# Patient Record
Sex: MALE | Race: WHITE | Employment: PART TIME | URBAN - METROPOLITAN AREA
[De-identification: names, ages, dates, MRNs, and addresses within clinical notes are randomized per-mention and may not be internally consistent; named-entity substitution may affect disease eponyms.]

---

## 2017-01-17 PROBLEM — T81.89XA NON-HEALING SURGICAL WOUND: Chronic | Status: ACTIVE | Noted: 2017-01-17

## 2017-02-07 ENCOUNTER — HOSPITAL ENCOUNTER (OUTPATIENT)
Dept: WOUND CARE | Age: 27
Discharge: HOME | End: 2017-02-07
Payer: COMMERCIAL

## 2017-02-07 VITALS
TEMPERATURE: 98.1 F | SYSTOLIC BLOOD PRESSURE: 121 MMHG | DIASTOLIC BLOOD PRESSURE: 64 MMHG | HEART RATE: 100 BPM | RESPIRATION RATE: 18 BRPM

## 2017-02-07 PROCEDURE — 97607 NEG PRS WND THR NDME<=50SQCM: CPT

## 2017-02-07 PROCEDURE — 97605 NEG PRS WND THER DME<=50SQCM: CPT | Performed by: SURGERY

## 2017-02-07 PROCEDURE — 11042 DBRDMT SUBQ TIS 1ST 20SQCM/<: CPT

## 2017-02-07 PROCEDURE — 6370000000 HC RX 637 (ALT 250 FOR IP): Performed by: SURGERY

## 2017-02-07 RX ADMIN — LIDOCAINE HYDROCHLORIDE: 20 JELLY TOPICAL at 11:13

## 2017-02-14 ENCOUNTER — HOSPITAL ENCOUNTER (OUTPATIENT)
Dept: WOUND CARE | Age: 27
Discharge: HOME | End: 2017-02-14
Payer: COMMERCIAL

## 2017-02-14 VITALS
SYSTOLIC BLOOD PRESSURE: 133 MMHG | DIASTOLIC BLOOD PRESSURE: 76 MMHG | RESPIRATION RATE: 18 BRPM | HEART RATE: 92 BPM | TEMPERATURE: 97 F

## 2017-02-14 PROCEDURE — 11042 DBRDMT SUBQ TIS 1ST 20SQCM/<: CPT

## 2017-02-14 PROCEDURE — 6370000000 HC RX 637 (ALT 250 FOR IP): Performed by: SURGERY

## 2017-02-14 RX ORDER — LEVOFLOXACIN 500 MG/1
500 TABLET, FILM COATED ORAL DAILY
Qty: 10 TABLET | Refills: 0 | Status: SHIPPED | OUTPATIENT
Start: 2017-02-14 | End: 2017-02-24

## 2017-02-14 RX ADMIN — LIDOCAINE HYDROCHLORIDE: 20 JELLY TOPICAL at 11:13

## 2017-02-21 ENCOUNTER — HOSPITAL ENCOUNTER (OUTPATIENT)
Dept: WOUND CARE | Age: 27
Discharge: HOME | End: 2017-02-21
Payer: COMMERCIAL

## 2017-02-21 VITALS
RESPIRATION RATE: 16 BRPM | TEMPERATURE: 97.1 F | HEART RATE: 102 BPM | DIASTOLIC BLOOD PRESSURE: 75 MMHG | SYSTOLIC BLOOD PRESSURE: 134 MMHG

## 2017-02-21 PROCEDURE — 11042 DBRDMT SUBQ TIS 1ST 20SQCM/<: CPT

## 2017-02-21 PROCEDURE — 6370000000 HC RX 637 (ALT 250 FOR IP): Performed by: SURGERY

## 2017-02-21 RX ADMIN — LIDOCAINE HYDROCHLORIDE: 20 JELLY TOPICAL at 11:18

## 2017-02-28 ENCOUNTER — HOSPITAL ENCOUNTER (OUTPATIENT)
Dept: WOUND CARE | Age: 27
Discharge: HOME | End: 2017-02-28
Payer: COMMERCIAL

## 2017-02-28 VITALS
DIASTOLIC BLOOD PRESSURE: 62 MMHG | TEMPERATURE: 97 F | SYSTOLIC BLOOD PRESSURE: 127 MMHG | RESPIRATION RATE: 16 BRPM | HEART RATE: 102 BPM

## 2017-02-28 PROCEDURE — 11042 DBRDMT SUBQ TIS 1ST 20SQCM/<: CPT

## 2017-02-28 PROCEDURE — 6370000000 HC RX 637 (ALT 250 FOR IP): Performed by: SURGERY

## 2017-02-28 RX ADMIN — LIDOCAINE HYDROCHLORIDE: 20 JELLY TOPICAL at 11:26

## 2017-03-07 ENCOUNTER — HOSPITAL ENCOUNTER (OUTPATIENT)
Dept: WOUND CARE | Age: 27
Discharge: HOME | End: 2017-03-07
Payer: COMMERCIAL

## 2017-03-07 VITALS
HEART RATE: 96 BPM | TEMPERATURE: 97.5 F | DIASTOLIC BLOOD PRESSURE: 88 MMHG | SYSTOLIC BLOOD PRESSURE: 137 MMHG | RESPIRATION RATE: 16 BRPM

## 2017-03-07 PROCEDURE — 97597 DBRDMT OPN WND 1ST 20 CM/<: CPT

## 2017-03-07 PROCEDURE — 6370000000 HC RX 637 (ALT 250 FOR IP): Performed by: SURGERY

## 2017-03-07 RX ADMIN — LIDOCAINE HYDROCHLORIDE: 20 JELLY TOPICAL at 11:41

## 2017-03-14 ENCOUNTER — HOSPITAL ENCOUNTER (OUTPATIENT)
Dept: WOUND CARE | Age: 27
Discharge: HOME | End: 2017-03-14
Payer: COMMERCIAL

## 2017-03-17 ENCOUNTER — HOSPITAL ENCOUNTER (OUTPATIENT)
Dept: WOUND CARE | Age: 27
Discharge: HOME | End: 2017-03-17
Payer: COMMERCIAL

## 2017-03-21 ENCOUNTER — HOSPITAL ENCOUNTER (OUTPATIENT)
Dept: WOUND CARE | Age: 27
Discharge: HOME | End: 2017-03-21
Payer: COMMERCIAL

## 2017-03-21 VITALS
RESPIRATION RATE: 18 BRPM | HEART RATE: 104 BPM | DIASTOLIC BLOOD PRESSURE: 72 MMHG | TEMPERATURE: 97.6 F | SYSTOLIC BLOOD PRESSURE: 120 MMHG

## 2017-03-21 PROCEDURE — 6370000000 HC RX 637 (ALT 250 FOR IP): Performed by: SURGERY

## 2017-03-21 PROCEDURE — 11042 DBRDMT SUBQ TIS 1ST 20SQCM/<: CPT

## 2017-03-21 RX ORDER — LIDOCAINE AND PRILOCAINE 25; 25 MG/G; MG/G
CREAM TOPICAL ONCE
Status: COMPLETED | OUTPATIENT
Start: 2017-03-21 | End: 2017-03-21

## 2017-03-21 RX ADMIN — LIDOCAINE AND PRILOCAINE: 25; 25 CREAM TOPICAL at 11:29

## 2017-04-04 ENCOUNTER — HOSPITAL ENCOUNTER (OUTPATIENT)
Dept: WOUND CARE | Age: 27
Discharge: HOME | End: 2017-04-04
Payer: COMMERCIAL

## 2017-04-04 VITALS
TEMPERATURE: 97.2 F | HEART RATE: 104 BPM | RESPIRATION RATE: 16 BRPM | DIASTOLIC BLOOD PRESSURE: 74 MMHG | SYSTOLIC BLOOD PRESSURE: 128 MMHG

## 2017-04-04 DIAGNOSIS — S31.819D WOUND OF RIGHT BUTTOCK, SUBSEQUENT ENCOUNTER: Primary | Chronic | ICD-10-CM

## 2017-04-04 DIAGNOSIS — T81.89XD NON-HEALING SURGICAL WOUND, SUBSEQUENT ENCOUNTER: Chronic | ICD-10-CM

## 2017-04-04 PROCEDURE — 11042 DBRDMT SUBQ TIS 1ST 20SQCM/<: CPT

## 2017-04-04 PROCEDURE — 11042 DBRDMT SUBQ TIS 1ST 20SQCM/<: CPT | Performed by: NURSE PRACTITIONER

## 2017-04-17 ENCOUNTER — HOSPITAL ENCOUNTER (OUTPATIENT)
Dept: CT IMAGING | Age: 27
Discharge: HOME | End: 2017-04-17
Payer: COMMERCIAL

## 2017-04-17 DIAGNOSIS — T81.89XD NON-HEALING SURGICAL WOUND, SUBSEQUENT ENCOUNTER: Chronic | ICD-10-CM

## 2017-04-17 DIAGNOSIS — S31.819D WOUND OF RIGHT BUTTOCK, SUBSEQUENT ENCOUNTER: Chronic | ICD-10-CM

## 2017-04-17 PROCEDURE — 6360000004 HC RX CONTRAST MEDICATION

## 2017-04-17 PROCEDURE — 72192 CT PELVIS W/O DYE: CPT

## 2017-04-17 RX ADMIN — IOHEXOL 50 ML: 240 INJECTION, SOLUTION INTRATHECAL; INTRAVASCULAR; INTRAVENOUS; ORAL at 13:31

## 2017-04-18 ENCOUNTER — HOSPITAL ENCOUNTER (OUTPATIENT)
Dept: WOUND CARE | Age: 27
Discharge: HOME | End: 2017-04-18
Payer: COMMERCIAL

## 2017-04-18 VITALS
SYSTOLIC BLOOD PRESSURE: 143 MMHG | TEMPERATURE: 96.7 F | DIASTOLIC BLOOD PRESSURE: 74 MMHG | HEART RATE: 113 BPM | RESPIRATION RATE: 16 BRPM

## 2017-04-18 PROCEDURE — 11042 DBRDMT SUBQ TIS 1ST 20SQCM/<: CPT

## 2017-04-18 PROCEDURE — 6370000000 HC RX 637 (ALT 250 FOR IP): Performed by: SURGERY

## 2017-04-18 RX ADMIN — LIDOCAINE HYDROCHLORIDE: 20 JELLY TOPICAL at 11:17

## 2017-04-25 ENCOUNTER — HOSPITAL ENCOUNTER (OUTPATIENT)
Dept: WOUND CARE | Age: 27
Discharge: HOME | End: 2017-04-25
Payer: COMMERCIAL

## 2017-05-02 ENCOUNTER — HOSPITAL ENCOUNTER (OUTPATIENT)
Dept: WOUND CARE | Age: 27
Discharge: HOME | End: 2017-05-02
Payer: COMMERCIAL

## 2017-05-02 VITALS
DIASTOLIC BLOOD PRESSURE: 69 MMHG | TEMPERATURE: 97.3 F | SYSTOLIC BLOOD PRESSURE: 133 MMHG | RESPIRATION RATE: 18 BRPM | HEART RATE: 95 BPM

## 2017-05-02 PROCEDURE — 6370000000 HC RX 637 (ALT 250 FOR IP): Performed by: SURGERY

## 2017-05-02 PROCEDURE — 11042 DBRDMT SUBQ TIS 1ST 20SQCM/<: CPT

## 2017-05-02 RX ADMIN — LIDOCAINE HYDROCHLORIDE: 20 JELLY TOPICAL at 11:50

## 2017-05-16 ENCOUNTER — HOSPITAL ENCOUNTER (OUTPATIENT)
Dept: WOUND CARE | Age: 27
Discharge: HOME | End: 2017-05-16
Payer: COMMERCIAL

## 2017-05-23 ENCOUNTER — HOSPITAL ENCOUNTER (OUTPATIENT)
Dept: WOUND CARE | Age: 27
Discharge: HOME | End: 2017-05-23
Payer: COMMERCIAL

## 2017-05-23 VITALS
RESPIRATION RATE: 18 BRPM | TEMPERATURE: 97.7 F | DIASTOLIC BLOOD PRESSURE: 76 MMHG | HEART RATE: 99 BPM | SYSTOLIC BLOOD PRESSURE: 134 MMHG

## 2017-05-23 PROCEDURE — 99212 OFFICE O/P EST SF 10 MIN: CPT

## 2017-05-23 PROCEDURE — 6370000000 HC RX 637 (ALT 250 FOR IP): Performed by: SURGERY

## 2017-05-23 PROCEDURE — G0463 HOSPITAL OUTPT CLINIC VISIT: HCPCS

## 2017-05-23 RX ADMIN — LIDOCAINE HYDROCHLORIDE: 20 JELLY TOPICAL at 10:04

## 2017-05-23 ASSESSMENT — PAIN SCALES - GENERAL: PAINLEVEL_OUTOF10: 0

## 2017-06-06 ENCOUNTER — HOSPITAL ENCOUNTER (OUTPATIENT)
Dept: WOUND CARE | Age: 27
Discharge: HOME | End: 2017-06-06
Payer: COMMERCIAL

## 2017-06-06 VITALS
DIASTOLIC BLOOD PRESSURE: 80 MMHG | TEMPERATURE: 97 F | HEART RATE: 99 BPM | RESPIRATION RATE: 16 BRPM | SYSTOLIC BLOOD PRESSURE: 142 MMHG

## 2017-06-06 PROCEDURE — 11042 DBRDMT SUBQ TIS 1ST 20SQCM/<: CPT

## 2017-06-06 PROCEDURE — 6370000000 HC RX 637 (ALT 250 FOR IP): Performed by: SURGERY

## 2017-06-06 RX ADMIN — LIDOCAINE HYDROCHLORIDE: 20 JELLY TOPICAL at 09:25

## 2017-06-13 ENCOUNTER — HOSPITAL ENCOUNTER (OUTPATIENT)
Dept: WOUND CARE | Age: 27
Discharge: HOME | End: 2017-06-13
Payer: COMMERCIAL

## 2017-06-14 DIAGNOSIS — T81.89XS NON-HEALING SURGICAL WOUND, SEQUELA: Chronic | ICD-10-CM

## 2017-06-14 DIAGNOSIS — Q05.9 MYELOMENINGOCELE (HCC): Primary | Chronic | ICD-10-CM

## 2017-06-14 DIAGNOSIS — Q03.9 CONGENITAL HYDROCEPHALUS (HCC): Chronic | ICD-10-CM

## 2017-06-14 DIAGNOSIS — M41.35 THORACOGENIC SCOLIOSIS OF THORACOLUMBAR REGION: Chronic | ICD-10-CM

## 2017-06-14 DIAGNOSIS — S31.819S WOUND OF RIGHT BUTTOCK, SEQUELA: Chronic | ICD-10-CM

## 2017-06-20 ENCOUNTER — HOSPITAL ENCOUNTER (OUTPATIENT)
Dept: WOUND CARE | Age: 27
Discharge: HOME | End: 2017-06-20
Payer: COMMERCIAL

## 2017-06-20 VITALS
RESPIRATION RATE: 16 BRPM | DIASTOLIC BLOOD PRESSURE: 75 MMHG | SYSTOLIC BLOOD PRESSURE: 127 MMHG | TEMPERATURE: 97.7 F | HEART RATE: 110 BPM

## 2017-06-20 DIAGNOSIS — S31.819D WOUND OF RIGHT BUTTOCK, SUBSEQUENT ENCOUNTER: Primary | Chronic | ICD-10-CM

## 2017-06-20 LAB
ALBUMIN SERPL-MCNC: 4.8 G/DL (ref 3.5–5.2)
CULTURE: NORMAL
DIRECT EXAM: NORMAL
ESTIMATED AVERAGE GLUCOSE: 97 MG/DL
HBA1C MFR BLD: 5 % (ref 4–6)
Lab: NORMAL
PREALBUMIN: 21 MG/DL (ref 20–40)
SPECIMEN DESCRIPTION: NORMAL
SPECIMEN DESCRIPTION: NORMAL
STATUS: NORMAL
TOTAL PROTEIN: 7.8 G/DL (ref 6.4–8.3)

## 2017-06-20 PROCEDURE — 86403 PARTICLE AGGLUT ANTBDY SCRN: CPT

## 2017-06-20 PROCEDURE — 87176 TISSUE HOMOGENIZATION CULTR: CPT

## 2017-06-20 PROCEDURE — 11042 DBRDMT SUBQ TIS 1ST 20SQCM/<: CPT

## 2017-06-20 PROCEDURE — 83036 HEMOGLOBIN GLYCOSYLATED A1C: CPT

## 2017-06-20 PROCEDURE — 6370000000 HC RX 637 (ALT 250 FOR IP): Performed by: SURGERY

## 2017-06-20 PROCEDURE — 87075 CULTR BACTERIA EXCEPT BLOOD: CPT

## 2017-06-20 PROCEDURE — 87185 SC STD ENZYME DETCJ PER NZM: CPT

## 2017-06-20 PROCEDURE — 82040 ASSAY OF SERUM ALBUMIN: CPT

## 2017-06-20 PROCEDURE — 87070 CULTURE OTHR SPECIMN AEROBIC: CPT

## 2017-06-20 PROCEDURE — 84155 ASSAY OF PROTEIN SERUM: CPT

## 2017-06-20 PROCEDURE — 84134 ASSAY OF PREALBUMIN: CPT

## 2017-06-20 PROCEDURE — 87076 CULTURE ANAEROBE IDENT EACH: CPT

## 2017-06-20 PROCEDURE — 87205 SMEAR GRAM STAIN: CPT

## 2017-06-20 PROCEDURE — 36415 COLL VENOUS BLD VENIPUNCTURE: CPT

## 2017-06-20 PROCEDURE — 87186 SC STD MICRODIL/AGAR DIL: CPT

## 2017-06-20 RX ADMIN — LIDOCAINE HYDROCHLORIDE: 20 JELLY TOPICAL at 10:29

## 2017-06-22 ENCOUNTER — TELEPHONE (OUTPATIENT)
Dept: FAMILY MEDICINE CLINIC | Age: 27
End: 2017-06-22

## 2017-06-24 LAB
CULTURE: ABNORMAL
DIRECT EXAM: ABNORMAL
DIRECT EXAM: ABNORMAL
Lab: ABNORMAL
ORGANISM: ABNORMAL
SPECIMEN DESCRIPTION: ABNORMAL
STATUS: ABNORMAL

## 2017-07-05 ENCOUNTER — CARE COORDINATION (OUTPATIENT)
Dept: CARE COORDINATION | Age: 27
End: 2017-07-05

## 2017-07-07 ENCOUNTER — HOSPITAL ENCOUNTER (OUTPATIENT)
Dept: WOUND CARE | Age: 27
Discharge: HOME | End: 2017-07-07
Payer: COMMERCIAL

## 2017-07-11 ENCOUNTER — HOSPITAL ENCOUNTER (OUTPATIENT)
Dept: WOUND CARE | Age: 27
Discharge: HOME | End: 2017-07-11
Payer: COMMERCIAL

## 2017-07-11 VITALS
HEART RATE: 90 BPM | RESPIRATION RATE: 16 BRPM | DIASTOLIC BLOOD PRESSURE: 71 MMHG | TEMPERATURE: 96.6 F | SYSTOLIC BLOOD PRESSURE: 136 MMHG

## 2017-07-11 PROCEDURE — 87205 SMEAR GRAM STAIN: CPT

## 2017-07-11 PROCEDURE — 11042 DBRDMT SUBQ TIS 1ST 20SQCM/<: CPT

## 2017-07-11 PROCEDURE — 87070 CULTURE OTHR SPECIMN AEROBIC: CPT

## 2017-07-11 PROCEDURE — 6370000000 HC RX 637 (ALT 250 FOR IP): Performed by: SURGERY

## 2017-07-11 PROCEDURE — 86403 PARTICLE AGGLUT ANTBDY SCRN: CPT

## 2017-07-11 PROCEDURE — 87186 SC STD MICRODIL/AGAR DIL: CPT

## 2017-07-11 RX ORDER — GAUZE BANDAGE 4" X 4"
1 BANDAGE TOPICAL DAILY
Qty: 30 EACH | Refills: 5 | Status: SHIPPED | OUTPATIENT
Start: 2017-07-11

## 2017-07-11 RX ADMIN — LIDOCAINE HYDROCHLORIDE: 20 JELLY TOPICAL at 09:33

## 2017-07-14 LAB
CULTURE: ABNORMAL
DIRECT EXAM: ABNORMAL
DIRECT EXAM: ABNORMAL
Lab: ABNORMAL
ORGANISM: ABNORMAL
SPECIMEN DESCRIPTION: ABNORMAL
SPECIMEN DESCRIPTION: ABNORMAL
STATUS: ABNORMAL

## 2017-07-17 RX ORDER — LEVOFLOXACIN 500 MG/1
500 TABLET, FILM COATED ORAL DAILY
Qty: 14 TABLET | Refills: 0 | Status: SHIPPED | OUTPATIENT
Start: 2017-07-17 | End: 2017-07-17 | Stop reason: SDUPTHER

## 2017-07-18 ENCOUNTER — HOSPITAL ENCOUNTER (OUTPATIENT)
Dept: WOUND CARE | Age: 27
Discharge: HOME | End: 2017-07-18
Payer: COMMERCIAL

## 2017-07-18 VITALS
RESPIRATION RATE: 16 BRPM | TEMPERATURE: 97.2 F | HEART RATE: 97 BPM | SYSTOLIC BLOOD PRESSURE: 128 MMHG | DIASTOLIC BLOOD PRESSURE: 74 MMHG

## 2017-07-18 PROCEDURE — 11042 DBRDMT SUBQ TIS 1ST 20SQCM/<: CPT

## 2017-07-18 PROCEDURE — 6370000000 HC RX 637 (ALT 250 FOR IP): Performed by: SURGERY

## 2017-07-18 RX ADMIN — LIDOCAINE HYDROCHLORIDE: 20 JELLY TOPICAL at 09:32

## 2017-07-25 ENCOUNTER — HOSPITAL ENCOUNTER (OUTPATIENT)
Dept: WOUND CARE | Age: 27
Discharge: HOME | End: 2017-07-25
Payer: COMMERCIAL

## 2017-07-26 ENCOUNTER — OFFICE VISIT (OUTPATIENT)
Dept: FAMILY MEDICINE CLINIC | Age: 27
End: 2017-07-26
Payer: COMMERCIAL

## 2017-07-26 VITALS
HEART RATE: 89 BPM | HEIGHT: 63 IN | SYSTOLIC BLOOD PRESSURE: 122 MMHG | WEIGHT: 156.09 LBS | DIASTOLIC BLOOD PRESSURE: 80 MMHG | BODY MASS INDEX: 27.66 KG/M2 | OXYGEN SATURATION: 98 %

## 2017-07-26 DIAGNOSIS — A49.01 MSSA (METHICILLIN SUSCEPTIBLE STAPHYLOCOCCUS AUREUS) INFECTION: ICD-10-CM

## 2017-07-26 DIAGNOSIS — Q05.9 MYELOMENINGOCELE (HCC): Primary | Chronic | ICD-10-CM

## 2017-07-26 PROCEDURE — 99213 OFFICE O/P EST LOW 20 MIN: CPT | Performed by: INTERNAL MEDICINE

## 2017-07-26 RX ORDER — LEVOFLOXACIN 500 MG/1
500 TABLET, FILM COATED ORAL DAILY
Qty: 14 TABLET | Refills: 0 | Status: CANCELLED | OUTPATIENT
Start: 2017-07-26 | End: 2017-08-09

## 2017-07-26 ASSESSMENT — PATIENT HEALTH QUESTIONNAIRE - PHQ9
SUM OF ALL RESPONSES TO PHQ9 QUESTIONS 1 & 2: 0
SUM OF ALL RESPONSES TO PHQ QUESTIONS 1-9: 0
1. LITTLE INTEREST OR PLEASURE IN DOING THINGS: 0
2. FEELING DOWN, DEPRESSED OR HOPELESS: 0

## 2017-07-26 ASSESSMENT — ENCOUNTER SYMPTOMS
CHEST TIGHTNESS: 0
APNEA: 0
WHEEZING: 0
FACIAL SWELLING: 0
ABDOMINAL PAIN: 0
ABDOMINAL DISTENTION: 0
COLOR CHANGE: 0
CONSTIPATION: 0
SHORTNESS OF BREATH: 0
BACK PAIN: 1
DIARRHEA: 0
COUGH: 0

## 2017-08-22 ENCOUNTER — HOSPITAL ENCOUNTER (OUTPATIENT)
Dept: WOUND CARE | Age: 27
Discharge: HOME | End: 2017-08-22
Payer: COMMERCIAL

## 2017-08-22 VITALS
DIASTOLIC BLOOD PRESSURE: 74 MMHG | HEART RATE: 111 BPM | RESPIRATION RATE: 18 BRPM | TEMPERATURE: 97.5 F | SYSTOLIC BLOOD PRESSURE: 130 MMHG

## 2017-08-22 PROCEDURE — 11042 DBRDMT SUBQ TIS 1ST 20SQCM/<: CPT

## 2017-08-22 PROCEDURE — 6370000000 HC RX 637 (ALT 250 FOR IP): Performed by: SURGERY

## 2017-08-22 RX ADMIN — LIDOCAINE HYDROCHLORIDE: 20 JELLY TOPICAL at 10:18

## 2017-08-29 ENCOUNTER — HOSPITAL ENCOUNTER (OUTPATIENT)
Dept: WOUND CARE | Age: 27
Discharge: HOME | End: 2017-08-29
Payer: COMMERCIAL

## 2017-08-29 VITALS
SYSTOLIC BLOOD PRESSURE: 132 MMHG | RESPIRATION RATE: 18 BRPM | HEART RATE: 92 BPM | DIASTOLIC BLOOD PRESSURE: 75 MMHG | TEMPERATURE: 97.8 F

## 2017-08-29 PROCEDURE — 6370000000 HC RX 637 (ALT 250 FOR IP): Performed by: SURGERY

## 2017-08-29 PROCEDURE — 11042 DBRDMT SUBQ TIS 1ST 20SQCM/<: CPT

## 2017-08-29 RX ADMIN — LIDOCAINE HYDROCHLORIDE: 20 JELLY TOPICAL at 10:43

## 2017-09-12 ENCOUNTER — HOSPITAL ENCOUNTER (OUTPATIENT)
Dept: WOUND CARE | Age: 27
Discharge: HOME | End: 2017-09-12
Payer: COMMERCIAL

## 2017-09-12 VITALS
DIASTOLIC BLOOD PRESSURE: 81 MMHG | TEMPERATURE: 97.2 F | RESPIRATION RATE: 18 BRPM | SYSTOLIC BLOOD PRESSURE: 145 MMHG | HEART RATE: 105 BPM

## 2017-09-12 PROCEDURE — 11042 DBRDMT SUBQ TIS 1ST 20SQCM/<: CPT

## 2017-09-12 ASSESSMENT — PAIN SCALES - GENERAL: PAINLEVEL_OUTOF10: 0

## 2017-09-26 ENCOUNTER — HOSPITAL ENCOUNTER (OUTPATIENT)
Dept: WOUND CARE | Age: 27
Discharge: HOME | End: 2017-09-26
Payer: COMMERCIAL

## 2017-09-26 VITALS
HEART RATE: 98 BPM | SYSTOLIC BLOOD PRESSURE: 140 MMHG | TEMPERATURE: 97.7 F | RESPIRATION RATE: 18 BRPM | DIASTOLIC BLOOD PRESSURE: 80 MMHG

## 2017-09-26 PROCEDURE — 6370000000 HC RX 637 (ALT 250 FOR IP): Performed by: SURGERY

## 2017-09-26 PROCEDURE — 11042 DBRDMT SUBQ TIS 1ST 20SQCM/<: CPT

## 2017-09-26 RX ADMIN — LIDOCAINE HYDROCHLORIDE: 20 JELLY TOPICAL at 10:27

## 2017-10-02 ENCOUNTER — HOSPITAL ENCOUNTER (OUTPATIENT)
Age: 27
Setting detail: OUTPATIENT SURGERY
Discharge: HOME | End: 2017-10-02
Attending: SURGERY | Admitting: SURGERY
Payer: COMMERCIAL

## 2017-10-02 ENCOUNTER — ANESTHESIA EVENT (OUTPATIENT)
Dept: OPERATING ROOM | Age: 27
End: 2017-10-02
Payer: COMMERCIAL

## 2017-10-02 ENCOUNTER — ANESTHESIA (OUTPATIENT)
Dept: OPERATING ROOM | Age: 27
End: 2017-10-02
Payer: COMMERCIAL

## 2017-10-02 VITALS
HEART RATE: 74 BPM | DIASTOLIC BLOOD PRESSURE: 82 MMHG | WEIGHT: 160 LBS | RESPIRATION RATE: 12 BRPM | TEMPERATURE: 98.2 F | BODY MASS INDEX: 30.21 KG/M2 | SYSTOLIC BLOOD PRESSURE: 126 MMHG | HEIGHT: 61 IN | OXYGEN SATURATION: 100 %

## 2017-10-02 VITALS — SYSTOLIC BLOOD PRESSURE: 99 MMHG | DIASTOLIC BLOOD PRESSURE: 55 MMHG | OXYGEN SATURATION: 100 % | TEMPERATURE: 94.3 F

## 2017-10-02 PROCEDURE — 2500000003 HC RX 250 WO HCPCS: Performed by: SURGERY

## 2017-10-02 PROCEDURE — 3700000001 HC ADD 15 MINUTES (ANESTHESIA): Performed by: SURGERY

## 2017-10-02 PROCEDURE — 7100000030 HC ASPR PHASE II RECOVERY - FIRST 15 MIN: Performed by: SURGERY

## 2017-10-02 PROCEDURE — 86403 PARTICLE AGGLUT ANTBDY SCRN: CPT

## 2017-10-02 PROCEDURE — 87176 TISSUE HOMOGENIZATION CULTR: CPT

## 2017-10-02 PROCEDURE — 87205 SMEAR GRAM STAIN: CPT

## 2017-10-02 PROCEDURE — 2500000003 HC RX 250 WO HCPCS: Performed by: NURSE ANESTHETIST, CERTIFIED REGISTERED

## 2017-10-02 PROCEDURE — 2580000003 HC RX 258: Performed by: SURGERY

## 2017-10-02 PROCEDURE — 88304 TISSUE EXAM BY PATHOLOGIST: CPT

## 2017-10-02 PROCEDURE — 7100000001 HC PACU RECOVERY - ADDTL 15 MIN: Performed by: SURGERY

## 2017-10-02 PROCEDURE — 6360000002 HC RX W HCPCS: Performed by: NURSE ANESTHETIST, CERTIFIED REGISTERED

## 2017-10-02 PROCEDURE — 3600000002 HC SURGERY LEVEL 2 BASE: Performed by: SURGERY

## 2017-10-02 PROCEDURE — 3600000012 HC SURGERY LEVEL 2 ADDTL 15MIN: Performed by: SURGERY

## 2017-10-02 PROCEDURE — 3700000000 HC ANESTHESIA ATTENDED CARE: Performed by: SURGERY

## 2017-10-02 PROCEDURE — 87070 CULTURE OTHR SPECIMN AEROBIC: CPT

## 2017-10-02 PROCEDURE — 87185 SC STD ENZYME DETCJ PER NZM: CPT

## 2017-10-02 PROCEDURE — 87102 FUNGUS ISOLATION CULTURE: CPT

## 2017-10-02 PROCEDURE — 87076 CULTURE ANAEROBE IDENT EACH: CPT

## 2017-10-02 PROCEDURE — 87206 SMEAR FLUORESCENT/ACID STAI: CPT

## 2017-10-02 PROCEDURE — 7100000000 HC PACU RECOVERY - FIRST 15 MIN: Performed by: SURGERY

## 2017-10-02 PROCEDURE — 87075 CULTR BACTERIA EXCEPT BLOOD: CPT

## 2017-10-02 PROCEDURE — 7100000031 HC ASPR PHASE II RECOVERY - ADDTL 15 MIN: Performed by: SURGERY

## 2017-10-02 RX ORDER — OXYCODONE HYDROCHLORIDE AND ACETAMINOPHEN 5; 325 MG/1; MG/1
2 TABLET ORAL PRN
Status: DISCONTINUED | OUTPATIENT
Start: 2017-10-02 | End: 2017-10-02 | Stop reason: HOSPADM

## 2017-10-02 RX ORDER — DIPHENHYDRAMINE HYDROCHLORIDE 50 MG/ML
12.5 INJECTION INTRAMUSCULAR; INTRAVENOUS
Status: DISCONTINUED | OUTPATIENT
Start: 2017-10-02 | End: 2017-10-02 | Stop reason: HOSPADM

## 2017-10-02 RX ORDER — BUPIVACAINE HYDROCHLORIDE 5 MG/ML
INJECTION, SOLUTION EPIDURAL; INTRACAUDAL PRN
Status: DISCONTINUED | OUTPATIENT
Start: 2017-10-02 | End: 2017-10-02 | Stop reason: HOSPADM

## 2017-10-02 RX ORDER — ROCURONIUM BROMIDE 10 MG/ML
INJECTION, SOLUTION INTRAVENOUS PRN
Status: DISCONTINUED | OUTPATIENT
Start: 2017-10-02 | End: 2017-10-02 | Stop reason: SDUPTHER

## 2017-10-02 RX ORDER — PROMETHAZINE HYDROCHLORIDE 25 MG/ML
6.25 INJECTION, SOLUTION INTRAMUSCULAR; INTRAVENOUS
Status: DISCONTINUED | OUTPATIENT
Start: 2017-10-02 | End: 2017-10-02 | Stop reason: HOSPADM

## 2017-10-02 RX ORDER — MEPERIDINE HYDROCHLORIDE 25 MG/ML
12.5 INJECTION INTRAMUSCULAR; INTRAVENOUS; SUBCUTANEOUS EVERY 5 MIN PRN
Status: DISCONTINUED | OUTPATIENT
Start: 2017-10-02 | End: 2017-10-02 | Stop reason: HOSPADM

## 2017-10-02 RX ORDER — NEOSTIGMINE METHYLSULFATE 1 MG/ML
INJECTION, SOLUTION INTRAVENOUS PRN
Status: DISCONTINUED | OUTPATIENT
Start: 2017-10-02 | End: 2017-10-02 | Stop reason: SDUPTHER

## 2017-10-02 RX ORDER — FENTANYL CITRATE 50 UG/ML
INJECTION, SOLUTION INTRAMUSCULAR; INTRAVENOUS PRN
Status: DISCONTINUED | OUTPATIENT
Start: 2017-10-02 | End: 2017-10-02 | Stop reason: SDUPTHER

## 2017-10-02 RX ORDER — MORPHINE SULFATE 2 MG/ML
1 INJECTION, SOLUTION INTRAMUSCULAR; INTRAVENOUS EVERY 5 MIN PRN
Status: DISCONTINUED | OUTPATIENT
Start: 2017-10-02 | End: 2017-10-02 | Stop reason: HOSPADM

## 2017-10-02 RX ORDER — FENTANYL CITRATE 50 UG/ML
25 INJECTION, SOLUTION INTRAMUSCULAR; INTRAVENOUS EVERY 5 MIN PRN
Status: DISCONTINUED | OUTPATIENT
Start: 2017-10-02 | End: 2017-10-02 | Stop reason: HOSPADM

## 2017-10-02 RX ORDER — LIDOCAINE HYDROCHLORIDE 10 MG/ML
INJECTION, SOLUTION EPIDURAL; INFILTRATION; INTRACAUDAL; PERINEURAL PRN
Status: DISCONTINUED | OUTPATIENT
Start: 2017-10-02 | End: 2017-10-02 | Stop reason: SDUPTHER

## 2017-10-02 RX ORDER — OXYCODONE HYDROCHLORIDE AND ACETAMINOPHEN 5; 325 MG/1; MG/1
1 TABLET ORAL PRN
Status: DISCONTINUED | OUTPATIENT
Start: 2017-10-02 | End: 2017-10-02 | Stop reason: HOSPADM

## 2017-10-02 RX ORDER — PROPOFOL 10 MG/ML
INJECTION, EMULSION INTRAVENOUS PRN
Status: DISCONTINUED | OUTPATIENT
Start: 2017-10-02 | End: 2017-10-02 | Stop reason: SDUPTHER

## 2017-10-02 RX ORDER — CLINDAMYCIN PHOSPHATE 150 MG/ML
INJECTION, SOLUTION INTRAVENOUS
Status: DISCONTINUED
Start: 2017-10-02 | End: 2017-10-02 | Stop reason: HOSPADM

## 2017-10-02 RX ORDER — GLYCOPYRROLATE 0.2 MG/ML
INJECTION INTRAMUSCULAR; INTRAVENOUS PRN
Status: DISCONTINUED | OUTPATIENT
Start: 2017-10-02 | End: 2017-10-02 | Stop reason: SDUPTHER

## 2017-10-02 RX ORDER — SODIUM CHLORIDE, SODIUM LACTATE, POTASSIUM CHLORIDE, CALCIUM CHLORIDE 600; 310; 30; 20 MG/100ML; MG/100ML; MG/100ML; MG/100ML
INJECTION, SOLUTION INTRAVENOUS CONTINUOUS
Status: DISCONTINUED | OUTPATIENT
Start: 2017-10-02 | End: 2017-10-02 | Stop reason: HOSPADM

## 2017-10-02 RX ORDER — MIDAZOLAM HYDROCHLORIDE 1 MG/ML
INJECTION INTRAMUSCULAR; INTRAVENOUS PRN
Status: DISCONTINUED | OUTPATIENT
Start: 2017-10-02 | End: 2017-10-02 | Stop reason: SDUPTHER

## 2017-10-02 RX ADMIN — NEOSTIGMINE METHYLSULFATE 1 MG: 1 INJECTION, SOLUTION INTRAVENOUS at 09:11

## 2017-10-02 RX ADMIN — SODIUM CHLORIDE, POTASSIUM CHLORIDE, SODIUM LACTATE AND CALCIUM CHLORIDE: 600; 310; 30; 20 INJECTION, SOLUTION INTRAVENOUS at 08:27

## 2017-10-02 RX ADMIN — DEXTROSE 900 MG: 50 INJECTION, SOLUTION INTRAVENOUS at 08:47

## 2017-10-02 RX ADMIN — GLYCOPYRROLATE 0.2 MG: 0.2 INJECTION, SOLUTION INTRAMUSCULAR; INTRAVENOUS at 09:11

## 2017-10-02 RX ADMIN — MIDAZOLAM 2 MG: 1 INJECTION INTRAMUSCULAR; INTRAVENOUS at 08:27

## 2017-10-02 RX ADMIN — ROCURONIUM BROMIDE 10 MG: 10 INJECTION INTRAVENOUS at 08:32

## 2017-10-02 RX ADMIN — LIDOCAINE HYDROCHLORIDE 50 MG: 10 INJECTION, SOLUTION EPIDURAL; INFILTRATION; INTRACAUDAL; PERINEURAL at 08:32

## 2017-10-02 RX ADMIN — FENTANYL CITRATE 100 MCG: 50 INJECTION, SOLUTION INTRAMUSCULAR; INTRAVENOUS at 08:32

## 2017-10-02 RX ADMIN — PROPOFOL 170 MG: 10 INJECTION, EMULSION INTRAVENOUS at 08:32

## 2017-10-02 ASSESSMENT — PAIN SCALES - GENERAL
PAINLEVEL_OUTOF10: 0
PAINLEVEL_OUTOF10: 0

## 2017-10-02 ASSESSMENT — PAIN - FUNCTIONAL ASSESSMENT: PAIN_FUNCTIONAL_ASSESSMENT: 0-10

## 2017-10-02 NOTE — ANESTHESIA PRE PROCEDURE
Department of Anesthesiology  Preprocedure Note       Name:  Justin Douglas   Age:  32 y.o.  :  1990                                          MRN:  [id number]         Date:  10/2/2017      Surgeon: Elsie Wilson):  Judith Howell MD    Procedure: Procedure(s):  EXCISION DEBRIDEMENT BUTTOCK WOUND     Medications prior to admission:   Prior to Admission medications    Medication Sig Start Date End Date Taking? Authorizing Provider   docusate sodium (COLACE) 100 MG capsule Take 1 capsule by mouth 2 times daily 1/10/17  Yes Judith Howell MD   loratadine (CLARITIN) 10 MG tablet Take 1 tablet by mouth daily 16  Yes Oneta Cheadle, MD   Multiple Vitamins-Minerals (THERAPEUTIC MULTIVITAMIN-MINERALS) tablet Take 1 tablet by mouth daily   Yes Historical Provider, MD   Riboflavin (VITAMIN B-2 PO) Take 1 tablet by mouth   Yes Historical Provider, MD   Gauze Pads & Dressings (GAUZE DRESSING) 4\"X4\" PADS 1 each by Does not apply route daily 17   Judith Howell MD   HYDROcodone-acetaminophen (NORCO) 5-325 MG per tablet 1-2 tablets po q 4 hours prn pain. 1/10/17   Judith Howell MD   Gauze Pads & Dressings (NU GAUZE PACKING STRIPS) MISC 1/4 inch plain packing gauze wet to dry to right buttock wound once daily cover with dry dressing 1/10/17   Judith Howell MD   SODIUM CHLORIDE, EXTERNAL, ([facility] SALINE WOUND [address]) 0.9 % SOLN Apply 5 mLs topically daily 1/10/17   Judith Howell MD   EPINEPHrine (EPIPEN 2-ERIKA) 0.3 MG/0.3ML SOAJ injection Inject as needed 16   Miky Leahy MD       Current medications:    No current facility-administered medications for this encounter. Allergies:     Allergies   Allergen Reactions    Latex Swelling    Seasonal Itching    Ceclor [Cefaclor]     Other      banannas    Peanuts [Peanut Oil] Hives       Problem List:    Patient Active Problem List   Diagnosis Code    Myelomeningocele ([address] Q05.9    Congenital hydrocephalus ([address] Q03.9    S/P  shunt Z98.2    Scoliosis M41.9    Complex partial seizures with consciousness impaired (HCC) G40.209    Chronic headaches R51    Wound of right buttock S31.819A    Sore throat J02.9    Otitis H66.90    Epistaxis R04.0    Non-healing surgical wound T81.89XA       Past Medical History:        Diagnosis Date    Chronic headaches     Complex partial seizures with consciousness impaired (HCC)     Congenital hydrocephalus (HCC)     Dysplasia of hip     Esotropia, unspecified     corrective surgery 2000    Myelomeningocele ([address]     L4 functional level    Neurogenic bladder     S/P  shunt     Scoliosis     Seizures ([address]     Tethered cord ([address]     Release 1993 and 1996       Past Surgical History:        Procedure Laterality Date    CYST INCISION AND DRAINAGE  01/10/2017    unroofing of chronic sinus tract right buttocks.  CYSTOSCOPY  5/29/15    EYE SURGERY      OTHER SURGICAL HISTORY Right 01/10/2017    unroofing buttock wound right side     SPINAL FUSION      T7-L4    VENTRICULOPERITONEAL SHUNT      last revised 1999       Social History:    Social History   Substance Use Topics    Smoking status: Never Smoker    Smokeless tobacco: Never Used    Alcohol use No                                Counseling given: Not Answered      Vital Signs (Current):   Vitals:    10/02/17 0712   BP: 130/78   Pulse: 90   Resp: 16   Temp: 97.5 °F (36.4 °C)   TempSrc: Oral   SpO2: 100%   Weight: 160 lb (72.6 kg)   Height: 5' 1\" (1.549 m)                                              BP Readings from Last 3 Encounters:   10/02/17 130/78   09/26/17 (!) 140/80   09/12/17 (!) 145/81       NPO Status:  after MN                                                                               BMI:   Wt Readings from Last 3 Encounters:   10/02/17 160 lb (72.6 kg)   07/26/17 156 lb 1.4 oz (70.8 kg)   01/10/17 156 lb (70.8 kg)     Body mass index is 30.23 kg/(m^2).     CBC:   Lab Results   Component Value Date    WBC 5.9 12/28/2016    RBC 5.08 12/28/2016 HGB 14.7 12/28/2016    HCT 44.0 12/28/2016    MCV 86.6 12/28/2016    RDW 13.2 12/28/2016     12/28/2016       CMP:   Lab Results   Component Value Date     12/28/2016    K 4.3 12/28/2016     12/28/2016    CO2 25 12/28/2016    BUN 23 12/28/2016    CREATININE 0.94 12/28/2016    GFRAA >60 12/28/2016    LABGLOM >60 12/28/2016    GLUCOSE 110 12/28/2016    PROT 7.8 06/20/2017    CALCIUM 9.9 12/28/2016    BILITOT 0.40 01/18/2013    ALKPHOS 77 01/18/2013    AST 22 01/18/2013    ALT 31 01/18/2013       POC Tests: No results for input(s): POCGLU, POCNA, POCK, POCCL, POCBUN, POCHEMO, POCHCT in the last 72 hours. Coags: No results found for: PROTIME, INR, APTT    HCG (If Applicable): No results found for: PREGTESTUR, PREGSERUM, HCG, HCGQUANT     ABGs: No results found for: PHART, PO2ART, JTK1BGA, WTK3BMN, BEART, Y3PIAGMS     Type & Screen (If Applicable):  No results found for: LABABO, [address]    Anesthesia Evaluation  Patient summary reviewed and Nursing notes reviewed no history of anesthetic complications:   Airway: Mallampati: III  TM distance: >3 FB   Neck ROM: full  Mouth opening: > = 3 FB Dental: normal exam         Pulmonary:negative ROS and normal exam  breath sounds clear to auscultation         Cardiovascular:negative ROS            Rhythm: regular  Rate: normal                 Neuro/Psych:   (+) seizures: no interval change, headaches:,       Comments: S/P  shunt,   Scoliosis , Complex partial seizures with consciousness impaired,   Myelomeningocele , Congenital hydrocephalus  GI/Hepatic/Renal: neg ROS          Endo/Other:             Comments: Non-healing surgical wound Abdominal:                    Anesthesia Plan    ASA 2     general     intravenous induction   Anesthetic plan and risks discussed with patient. Plan discussed with CRNA.             Lacey Medeiros MD   10/2/2017

## 2017-10-02 NOTE — IP AVS SNAPSHOT
Patient Information     Patient Name NICOLA Hemphill 1990         This is your updated medication list to keep with you all times      TAKE these medications     docusate sodium 100 MG capsule   Commonly known as:  COLACE   Take 1 capsule by mouth 2 times daily       EPINEPHrine 0.3 MG/0.3ML Soaj injection   Commonly known as:  EPIPEN 2-ERIKA   Inject as needed       HYDROcodone-acetaminophen 5-325 MG per tablet   Commonly known as:  NORCO   1-2 tablets po q 4 hours prn pain.       loratadine 10 MG tablet   Commonly known as:  CLARITIN   Take 1 tablet by mouth daily       * Nu Gauze Packing Strips Misc   1/4 inch plain packing gauze wet to dry to right buttock wound once daily cover with dry dressing       * Gauze Dressing 4\"X4\" Pads   1 each by Does not apply route daily       SODIUM CHLORIDE (EXTERNAL) 0.9 % Soln   Commonly known as:  [facility]S SALINE WOUND WASH   Apply 5 mLs topically daily       therapeutic multivitamin-minerals tablet       VITAMIN B-2 PO       * Notice: This list has 2 medication(s) that are the same as other medications prescribed for you. Read the directions carefully, and ask your doctor or other care provider to review them with you.

## 2017-10-02 NOTE — IP AVS SNAPSHOT
After Visit Summary  (Discharge Instructions)    Medication List for Home    Based on the information you provided to us as well as any changes during this visit, the following is your updated medication list.  Compare this with your prescription bottles at home. If you have any questions or concerns, contact your primary care physician's office.              Daily Medication List (This medication list can be shared with any healthcare provider who is helping you manage your medications)      These are medications you told us you were taking at home, CONTINUE taking them after you leave the hospital        Last Dose    Next Dose Due AM NOON PM NIGHT    docusate sodium 100 MG capsule   Commonly known as:  COLACE   Take 1 capsule by mouth 2 times daily                                         EPINEPHrine 0.3 MG/0.3ML Soaj injection   Commonly known as:  EPIPEN 2-ERIKA   Inject as needed                                         HYDROcodone-acetaminophen 5-325 MG per tablet   Commonly known as:  NORCO   1-2 tablets po q 4 hours prn pain.                                         loratadine 10 MG tablet   Commonly known as:  CLARITIN   Take 1 tablet by mouth daily                                         Nu Gauze Packing Strips Misc   1/4 inch plain packing gauze wet to dry to right buttock wound once daily cover with dry dressing                                         Gauze Dressing 4\"X4\" Pads   1 each by Does not apply route daily                                         SODIUM CHLORIDE (EXTERNAL) 0.9 % Soln   Commonly known as:  [facility] SALINE WOUND WASH   Apply 5 mLs topically daily                                         therapeutic multivitamin-minerals tablet   Take 1 tablet by mouth daily                                         VITAMIN B-2 PO   Take 1 tablet by mouth                                                 Allergies as of 10/2/2017        Reactions    Latex Swelling    Seasonal Itching    Ceclor [Cefaclor] Other     banannas    Peanuts [Peanut Oil] Hives      Immunizations as of 10/2/2017     No immunizations on file. Last Vitals          Most Recent Value    Temp  97.5 °F (36.4 °C)    Pulse  69    Resp  12    BP  107/64         After Visit Summary    This summary was created for you. Thank you for entrusting your care to us. The following information includes details about your hospital/visit stay along with steps you should take to help with your recovery once you leave the hospital.  In this packet, you will find information about the topics listed below:    · Instructions about your medications including a list of your home medications  · A summary of your hospital visit  · Follow-up appointments once you have left the hospital  · Your care plan at home      You may receive a survey regarding the care you received during your stay. Your input is valuable to us. We encourage you to complete and return your survey in the envelope provided. We hope you will choose us in the future for your healthcare needs. Patient Information     Patient Name NICOLA Borja 1990      Care Provided at:     Name Address Phone       [address] [phone]            Your Visit    Here you will find information about your visit, including the reason for your visit. Please take this sheet with you when you visit your doctor or other health care provider in the future. It will help determine the best possible medical care for you at that time. If you have any questions once you leave the hospital, please call the department phone number listed below. Why you were here     Your primary diagnosis was:  Not on File      Visit Information     Date & Time Provider Department Dept. Phone    10/2/2017 Mary Ornelas MD [address] [place] [address] [phone]       Follow-up Appointments    Below is a list of your follow-up and future appointments.  This may not be 6. Unable to urinate within 8 hours after surgery. 7. For any questions. DISCHARGE INSTRUCTIONS FOR GENERAL ANESTHESIA    In order to continue your care at home, please follow the instructions below. Anesthesia  Do not drink any alcoholic beverages or make any legal or important decisions for 24 hours. If your surgery was on an extremity or abdomen, do not drive or operate any machinery until your surgeon approves, otherwise do not drive or operate any machinery for 24 hours or as restricted by your surgeon. Pain Medications  Please do not drive, operate machinery, or drink alcoholic beverages while taking any prescribed pain medication. Diet -  Start out eating lightly (broth, soup, crackers, toast, etc.) advancing as tolerated to your usual diet. Try to avoid spicy and greasy/fatty foods for 24 hours. Drink plenty of fluids after surgery, unless you are on a fluid restriction. Avoid milk/milk product for several hours. Call your surgeon for the following:  ? You have pain that does not get better after you take pain medicine. ? For an oral temperature (by mouth) is 101 degrees or higher, chills, or excessive sweating. ? You have increasing and progressive bleeding or drainage from surgery site. ? Signs of an infection:  increased swelling, redness, warmth, or hardness around surgery area or yellow or green drainage. ? Persistent nausea or vomiting and cant keep fluids down. ? If you are unable to urinate within 8 hours of surgery. ? Redness or swelling at IV site. ? For any questions or concerns you may have. MyChart Signup     MyChart allows you to send messages to your doctor, view your test results, renew your prescriptions, schedule appointments, view visit notes, and more. How Do I Sign Up? 1. In your Internet browser, go to [email or web address]  2. Click on the Sign Up Now link in the Sign In box.  You will see the New with me, the patient and/or responsible adult, by my health care provider(s). I had the opportunity to ask questions regarding this information. I understand I should dispose of my armband safely at home to protect my health information. A complete copy of the After Visit Summary has been given to me, the patient and/or responsible adult.            Patient Signature/Responsible Adult:____________________    Clinician Signature:_____________________    Date:_____________________    Time:_____________________

## 2017-10-03 LAB
DIRECT EXAM: NORMAL
DIRECT EXAM: NORMAL
Lab: NORMAL
SPECIMEN DESCRIPTION: NORMAL
SPECIMEN DESCRIPTION: NORMAL
STATUS: NORMAL
SURGICAL PATHOLOGY REPORT: NORMAL

## 2017-10-07 LAB
CULTURE: ABNORMAL
DIRECT EXAM: ABNORMAL
Lab: ABNORMAL
Lab: ABNORMAL
SPECIMEN DESCRIPTION: ABNORMAL
STATUS: ABNORMAL
STATUS: ABNORMAL

## 2017-10-10 ENCOUNTER — HOSPITAL ENCOUNTER (OUTPATIENT)
Dept: WOUND CARE | Age: 27
Discharge: HOME | End: 2017-10-10
Payer: COMMERCIAL

## 2017-10-10 VITALS
TEMPERATURE: 97 F | HEART RATE: 96 BPM | DIASTOLIC BLOOD PRESSURE: 66 MMHG | SYSTOLIC BLOOD PRESSURE: 120 MMHG | RESPIRATION RATE: 18 BRPM

## 2017-10-10 DIAGNOSIS — J39.2 PHARYNGEAL ULCER: ICD-10-CM

## 2017-10-10 PROCEDURE — 11043 DBRDMT MUSC&/FSCA 1ST 20/<: CPT

## 2017-10-10 PROCEDURE — 6370000000 HC RX 637 (ALT 250 FOR IP): Performed by: SURGERY

## 2017-10-10 PROCEDURE — 11046 DBRDMT MUSC&/FSCA EA ADDL: CPT

## 2017-10-10 RX ORDER — CHLORHEXIDINE GLUCONATE 0.12 MG/ML
15 RINSE ORAL 2 TIMES DAILY
Qty: 420 ML | Refills: 0 | Status: SHIPPED | OUTPATIENT
Start: 2017-10-10 | End: 2017-10-24

## 2017-10-10 RX ORDER — AMOXICILLIN 250 MG/1
250 CAPSULE ORAL 2 TIMES DAILY
COMMUNITY
End: 2017-11-07 | Stop reason: ALTCHOICE

## 2017-10-10 RX ADMIN — LIDOCAINE HYDROCHLORIDE: 20 JELLY TOPICAL at 10:09

## 2017-10-10 NOTE — PLAN OF CARE
Problem: Falls - Risk of:  Goal: Will remain free from falls  Will remain free from falls   Outcome: Met This Shift  Safety maintained no falls this visit

## 2017-10-10 NOTE — PROGRESS NOTES
outer buttock (Active)   Dressing Status Old drainage 10/31/2016 10:07 AM   Dressing Changed Changed/New 10/31/2016 10:07 AM   Wound Cleansed Rinsed/Irrigated with saline 10/31/2016 10:07 AM   Wound Length (cm) 0.2 cm 10/31/2016 10:25 AM   Wound Width (cm) 0.3 cm 10/31/2016 10:25 AM   Wound Depth (cm)  1.5 10/31/2016 10:25 AM   Calculated Wound Size (cm^2) (l*w) 0.06 cm^2 10/31/2016 10:25 AM   Change in Wound Size % (l*w) 87.5 10/31/2016 10:25 AM   [place]-wound Assessment Clean;Dry; Intact 10/10/2016  9:43 AM   Red%Wound Bed 100 10/31/2016 10:07 AM   Yellow%Wound Bed 90 10/10/2016  9:43 AM   Drainage Amount Moderate 10/31/2016 10:07 AM   Drainage Description Serosanguinous 10/31/2016 10:07 AM   Odor None 10/31/2016 10:07 AM   Debridement per physician Subcutaneous 10/31/2016 10:25 AM   Time out Yes 10/31/2016 10:25 AM   Procedural Pain 0 10/31/2016 10:25 AM   Post procedural Pain 0 10/31/2016 10:25 AM   Number of days: 365       Wound 12/05/16 #4 right buttock (Active)   Wound Type Wound 10/10/2017 10:06 AM   Dressing Status Old drainage 10/10/2017 10:06 AM   Dressing Changed Changed/New 10/10/2017 10:06 AM   Wound Cleansed Wound cleanser 10/10/2017 10:06 AM   Wound Length (cm) 10 cm 10/10/2017 10:22 AM   Wound Width (cm) 4 cm 10/10/2017 10:22 AM   Wound Depth (cm)  5.4 10/10/2017 10:22 AM   Calculated Wound Size (cm^2) (l*w) 40 cm^2 10/10/2017 10:22 AM   Change in Wound Size % (l*w) -83197 10/10/2017 10:22 AM   Distance Tunneling (cm) 9 cm 7/11/2017 10:03 AM   Tunneling Position ___ O'Clock 500 7/11/2017 10:03 AM   Wound Assessment Clean;Dry; Intact 9/26/2017 10:22 AM   Margins Defined edges 10/10/2017 10:06 AM   Alayna-wound Assessment Clean;Dry; Intact 10/10/2017 10:06 AM   [place]%Wound Bed 0 8/29/2017 10:36 AM   Red%Wound Bed 80 10/10/2017 10:06 AM   Yellow%Wound Bed 20 10/10/2017 10:06 AM   Drainage Amount Moderate 10/10/2017 10:06 AM   Drainage Description Serosanguinous 10/10/2017 10:06 AM   Odor None 10/10/2017 10:06

## 2017-10-10 NOTE — PLAN OF CARE
Problem: Wound:  Goal: Will show signs of wound healing; wound closure and no evidence of infection  Will show signs of wound healing; wound closure and no evidence of infection   Outcome: Ongoing  Wound vac applied to post op wound

## 2017-10-17 ENCOUNTER — HOSPITAL ENCOUNTER (OUTPATIENT)
Dept: WOUND CARE | Age: 27
Discharge: HOME | End: 2017-10-17
Payer: COMMERCIAL

## 2017-10-17 VITALS
HEART RATE: 100 BPM | DIASTOLIC BLOOD PRESSURE: 77 MMHG | RESPIRATION RATE: 18 BRPM | WEIGHT: 160 LBS | TEMPERATURE: 97.2 F | HEIGHT: 61 IN | BODY MASS INDEX: 30.21 KG/M2 | SYSTOLIC BLOOD PRESSURE: 134 MMHG

## 2017-10-17 PROCEDURE — 11043 DBRDMT MUSC&/FSCA 1ST 20/<: CPT

## 2017-10-17 PROCEDURE — 6370000000 HC RX 637 (ALT 250 FOR IP): Performed by: SURGERY

## 2017-10-17 PROCEDURE — 11046 DBRDMT MUSC&/FSCA EA ADDL: CPT

## 2017-10-17 PROCEDURE — 97605 NEG PRS WND THER DME<=50SQCM: CPT

## 2017-10-17 RX ADMIN — LIDOCAINE HYDROCHLORIDE: 20 JELLY TOPICAL at 10:13

## 2017-10-17 ASSESSMENT — PAIN SCALES - GENERAL: PAINLEVEL_OUTOF10: 0

## 2017-10-17 NOTE — PLAN OF CARE
Problem: Wound:  Goal: Will show signs of wound healing; wound closure and no evidence of infection  Will show signs of wound healing; wound closure and no evidence of infection   Outcome: Ongoing  Wound fill

## 2017-10-17 NOTE — PROGRESS NOTES
Number of days: 15       Wound 10/10/16 #3 right outer buttock (Active)   Dressing Status Old drainage 10/31/2016 10:07 AM   Dressing Changed Changed/New 10/31/2016 10:07 AM   Wound Cleansed Rinsed/Irrigated with saline 10/31/2016 10:07 AM   Wound Length (cm) 0.2 cm 10/31/2016 10:25 AM   Wound Width (cm) 0.3 cm 10/31/2016 10:25 AM   Wound Depth (cm)  1.5 10/31/2016 10:25 AM   Calculated Wound Size (cm^2) (l*w) 0.06 cm^2 10/31/2016 10:25 AM   Change in Wound Size % (l*w) 87.5 10/31/2016 10:25 AM   Alayna-wound Assessment Clean;Dry; Intact 10/10/2016  9:43 AM   Red%Wound Bed 100 10/31/2016 10:07 AM   Yellow%Wound Bed 90 10/10/2016  9:43 AM   Drainage Amount Moderate 10/31/2016 10:07 AM   Drainage Description Serosanguinous 10/31/2016 10:07 AM   Odor None 10/31/2016 10:07 AM   Debridement per physician Subcutaneous 10/31/2016 10:25 AM   Time out Yes 10/31/2016 10:25 AM   Procedural Pain 0 10/31/2016 10:25 AM   Post procedural Pain 0 10/31/2016 10:25 AM   Number of days: 372       Wound 12/05/16 #4 right buttock (Active)   Wound Type Wound 10/17/2017 10:10 AM   Dressing Status Old drainage 10/17/2017 10:10 AM   Dressing Changed Changed/New 10/17/2017 10:10 AM   Wound Cleansed Rinsed/Irrigated with saline 10/17/2017 10:10 AM   Wound Length (cm) 9 cm 10/17/2017 10:40 AM   Wound Width (cm) 0.5 cm 10/17/2017 10:40 AM   Wound Depth (cm)  3.5 10/17/2017 10:40 AM   Calculated Wound Size (cm^2) (l*w) 4.5 cm^2 10/17/2017 10:40 AM   Change in Wound Size % (l*w) -2712.5 10/17/2017 10:40 AM   Distance Tunneling (cm) 9 cm 7/11/2017 10:03 AM   Tunneling Position ___ O'Clock 500 7/11/2017 10:03 AM   Wound Assessment Clean;Dry; Intact 9/26/2017 10:22 AM   Margins Defined edges 10/17/2017 10:10 AM   Alayna-wound Assessment Clean;Dry; Intact 10/17/2017 10:10 AM   [place]%Wound Bed 0 8/29/2017 10:36 AM   Red%Wound Bed 80 10/17/2017 10:10 AM   Yellow%Wound Bed 10 10/17/2017 10:10 AM   Drainage Amount Moderate 10/17/2017 10:10 AM   Drainage Description Serosanguinous 10/17/2017 10:10 AM   Odor None 10/17/2017 10:10 AM   Culture Taken Yes 7/11/2017 10:03 AM   Debridement per physician Muscle 10/10/2017 10:22 AM   Time out Yes 10/10/2017 10:22 AM   Procedural Pain 0 10/10/2017 10:22 AM   Post procedural Pain 0 10/10/2017 10:22 AM   Number of days: 315        The patients pain is mild. Wound is good granulation and healing  Wound is has slightly improved. Assessment:     Procedure Note:Procedure Note: The wound is anesthetized with: lidocaine gel    Debridement: Excisional Debridement    Using curette the wound was sharply debrided    down through and including the removal of muscle/fascia. Devitalized Tissue Debrided:  fibrin, biofilm and slough. Bleeding: Minimal    Hemostasis:   by pressure    Response to treatment:  Well tolerated by patient. Post debridement wound description:  open, good granulation tissue, sero-sanguinous drainage  Post debridement Wound Location:buttocks    32 y.o. male   Patient Active Problem List   Diagnosis    [facility]    Congenital hydrocephalus ([facility]    S/P  shunt    Scoliosis    Complex partial seizures with consciousness impaired (HCC)    Chronic headaches    Wound of right buttock    Throat pain    Otitis    Epistaxis    Non-healing surgical wound    Pharyngeal ulcer       Plan:     Discharge Treatment  Keep wound dry in shower:  while wound vac is on  Wound Negative Pressure:  175mmHg black sponge  continuous     Multivitamin once daily    High Protein diet    Smoking cessation:   N/A    Discussed appropriate home care of this wound. Wound redressed. Patient instructions were given. Follow up: 1 week.            Electronically signed by Christian Obregon MD on 10/17/2017 at 10:45 AM

## 2017-10-24 ENCOUNTER — HOSPITAL ENCOUNTER (OUTPATIENT)
Dept: WOUND CARE | Age: 27
Discharge: HOME | End: 2017-10-24
Payer: COMMERCIAL

## 2017-10-24 VITALS
HEART RATE: 115 BPM | WEIGHT: 160 LBS | SYSTOLIC BLOOD PRESSURE: 132 MMHG | DIASTOLIC BLOOD PRESSURE: 85 MMHG | BODY MASS INDEX: 30.21 KG/M2 | TEMPERATURE: 97.7 F | HEIGHT: 61 IN | RESPIRATION RATE: 18 BRPM

## 2017-10-24 DIAGNOSIS — T81.89XD NON-HEALING SURGICAL WOUND, SUBSEQUENT ENCOUNTER: Primary | Chronic | ICD-10-CM

## 2017-10-24 PROCEDURE — 86403 PARTICLE AGGLUT ANTBDY SCRN: CPT

## 2017-10-24 PROCEDURE — 87205 SMEAR GRAM STAIN: CPT

## 2017-10-24 PROCEDURE — 97605 NEG PRS WND THER DME<=50SQCM: CPT

## 2017-10-24 PROCEDURE — 11043 DBRDMT MUSC&/FSCA 1ST 20/<: CPT

## 2017-10-24 PROCEDURE — 87176 TISSUE HOMOGENIZATION CULTR: CPT

## 2017-10-24 PROCEDURE — 87070 CULTURE OTHR SPECIMN AEROBIC: CPT

## 2017-10-24 PROCEDURE — 6370000000 HC RX 637 (ALT 250 FOR IP): Performed by: SURGERY

## 2017-10-24 PROCEDURE — 87075 CULTR BACTERIA EXCEPT BLOOD: CPT

## 2017-10-24 RX ADMIN — LIDOCAINE HYDROCHLORIDE: 20 JELLY TOPICAL at 10:03

## 2017-10-24 ASSESSMENT — PAIN SCALES - GENERAL: PAINLEVEL_OUTOF10: 0

## 2017-10-24 NOTE — PLAN OF CARE
Problem: Wound:  Goal: Will show signs of wound healing; wound closure and no evidence of infection  Will show signs of wound healing; wound closure and no evidence of infection   Outcome: Ongoing  Wound stable will continue wound vac

## 2017-10-24 NOTE — PROGRESS NOTES
Wound Care Progress Note    Nourm G Freyer  AGE: 32 y.o. GENDER: male  : 1990  TODAY'S DATE:  10/24/2017    Subjective:   CHIEF COMPLAINT:  Right buttock wound      HISTORY of PRESENT ILLNESS HPI   Judd Meredith is a 32 y.o. male who presents today for wound evaluation. Wound Type:pressure and non-healing surgical  Wound Location:buttocks  Modifying factors:chronic pressure      Diagnosis Date    Chronic headaches     Complex partial seizures with consciousness impaired (HCC)     Congenital hydrocephalus (HCC)     Dysplasia of hip     Esotropia, unspecified     corrective surgery     Myelomeningocele ([facility] [address] [facility]     L4 functional level    Neurogenic bladder     S/P  shunt     Scoliosis     Seizures (HCC)     Tethered cord ([facility] [address] [facility]     Release  and      Patient Active Problem List   Diagnosis Code    Myelomeningocele ([facility] Q05.9    Congenital hydrocephalus (HCC) Q03.9    S/P  shunt Z98.2    Scoliosis M41.9    Complex partial seizures with consciousness impaired (HCC) G40.209    Chronic headaches R51    Wound of right buttock S31.819A    Throat pain R07.0    Otitis H66.90    Epistaxis R04.0    Non-healing surgical wound T81.89XA    Pharyngeal ulcer J39.2       ALLERGIES    Allergies   Allergen Reactions    Latex Swelling    Seasonal Itching    Ceclor [Cefaclor]     Other      banannas    Peanuts [Peanut Oil] Hives       Objective:      /85   Pulse 115   Temp 97.7 °F (36.5 °C) (Tympanic)   Resp 18   Ht 5' 1\" (1.549 m)   Wt 160 lb (72.6 kg)   BMI 30.23 kg/m²     Pre Debridement Measurements:  Negative Pressure Wound Therapy Buttocks Right (Active)   Wound Assessment Clean;Dry; Intact 10/2/2017 10:22 AM   [place]-wound Assessment Clean; Intact;Dry 10/2/2017 10:22 AM   Dressing Type Black foam 10/2/2017 10:22 AM   Cycle Continuous 10/2/2017 10:22 AM   Dressing Status Clean;Dry; Intact 10/2/2017 10:22 AM   Drainage Amount None 10/2/2017 10:22 AM   Odor None

## 2017-10-28 LAB
CULTURE: ABNORMAL
DIRECT EXAM: ABNORMAL
DIRECT EXAM: ABNORMAL
Lab: ABNORMAL
SPECIMEN DESCRIPTION: ABNORMAL
STATUS: ABNORMAL

## 2017-10-31 ENCOUNTER — HOSPITAL ENCOUNTER (OUTPATIENT)
Dept: WOUND CARE | Age: 27
Discharge: HOME | End: 2017-10-31
Payer: COMMERCIAL

## 2017-10-31 VITALS
WEIGHT: 160 LBS | TEMPERATURE: 97.7 F | BODY MASS INDEX: 30.21 KG/M2 | RESPIRATION RATE: 18 BRPM | HEART RATE: 116 BPM | SYSTOLIC BLOOD PRESSURE: 137 MMHG | HEIGHT: 61 IN | DIASTOLIC BLOOD PRESSURE: 33 MMHG

## 2017-10-31 LAB
CULTURE: NORMAL
CULTURE: NORMAL
Lab: NORMAL
SPECIMEN DESCRIPTION: NORMAL
SPECIMEN DESCRIPTION: NORMAL
STATUS: NORMAL

## 2017-10-31 PROCEDURE — 6370000000 HC RX 637 (ALT 250 FOR IP): Performed by: SURGERY

## 2017-10-31 PROCEDURE — 11043 DBRDMT MUSC&/FSCA 1ST 20/<: CPT

## 2017-10-31 RX ADMIN — LIDOCAINE HYDROCHLORIDE: 20 JELLY TOPICAL at 11:04

## 2017-10-31 ASSESSMENT — PAIN SCALES - GENERAL: PAINLEVEL_OUTOF10: 0

## 2017-10-31 NOTE — PROGRESS NOTES
Wound Care Progress Note    Nourm G Freyer  AGE: 32 y.o. GENDER: male  : 1990  TODAY'S DATE:  10/31/2017    Subjective:   CHIEF COMPLAINT:  Right buttock wound      HISTORY of PRESENT ILLNESS HPI   Judd Elizabeth is a 32 y.o. male who presents today for wound evaluation. Wound Type:non-healing surgical and pressure  Wound Location:buttocks  Modifying factors:chronic pressure      Diagnosis Date    Chronic headaches     Complex partial seizures with consciousness impaired (HCC)     Congenital hydrocephalus (HCC)     Dysplasia of hip     Esotropia, unspecified     corrective surgery     Myelomeningocele ([facility] [address]     L4 functional level    Neurogenic bladder     S/P  shunt     Scoliosis     Seizures (HCC)     Tethered cord ([facility] [address]     Release  and      Patient Active Problem List   Diagnosis Code    Myelomeningocele ([facility] [address] Q05.9    Congenital hydrocephalus (HCC) Q03.9    S/P  shunt Z98.2    Scoliosis M41.9    Complex partial seizures with consciousness impaired (HCC) G40.209    Chronic headaches R51    Wound of right buttock S31.819A    Throat pain R07.0    Otitis H66.90    Epistaxis R04.0    Non-healing surgical wound T81.89XA    Pharyngeal ulcer J39.2       ALLERGIES    Allergies   Allergen Reactions    Latex Swelling    Seasonal Itching    Ceclor [Cefaclor]     Other      banannas    Peanuts [Peanut Oil] Hives       Objective:      BP (!) 137/33   Pulse 116   Temp 97.7 °F (36.5 °C) (Tympanic)   Resp 18   Ht 5' 1\" (1.549 m)   Wt 160 lb (72.6 kg)   BMI 30.23 kg/m²     Pre Debridement Measurements:  Negative Pressure Wound Therapy Buttocks Right (Active)   Wound Assessment Clean;Dry; Intact 10/2/2017 10:22 AM   Alayna-wound Assessment Clean; Intact;Dry 10/2/2017 10:22 AM   Dressing Type Black foam 10/2/2017 10:22 AM   Cycle Continuous 10/2/2017 10:22 AM   Dressing Status Clean;Dry; Intact 10/2/2017 10:22 AM   Drainage Amount None 10/2/2017 10:22 AM   Odor None 10/2/2017 10:22 AM   Number of days: 29       Wound 10/10/16 #3 right outer buttock (Active)   Dressing Status Old drainage 10/31/2016 10:07 AM   Dressing Changed Changed/New 10/31/2016 10:07 AM   Wound Cleansed Rinsed/Irrigated with saline 10/31/2016 10:07 AM   Wound Length (cm) 0.2 cm 10/31/2016 10:25 AM   Wound Width (cm) 0.3 cm 10/31/2016 10:25 AM   Wound Depth (cm)  1.5 10/31/2016 10:25 AM   Calculated Wound Size (cm^2) (l*w) 0.06 cm^2 10/31/2016 10:25 AM   Change in Wound Size % (l*w) 87.5 10/31/2016 10:25 AM   Alayna-wound Assessment Clean;Dry; Intact 10/10/2016  9:43 AM   Red%Wound Bed 100 10/31/2016 10:07 AM   Yellow%Wound Bed 90 10/10/2016  9:43 AM   Drainage Amount Moderate 10/31/2016 10:07 AM   Drainage Description Serosanguinous 10/31/2016 10:07 AM   Odor None 10/31/2016 10:07 AM   Debridement per physician Subcutaneous 10/31/2016 10:25 AM   Time out Yes 10/31/2016 10:25 AM   Procedural Pain 0 10/31/2016 10:25 AM   Post procedural Pain 0 10/31/2016 10:25 AM   Number of days: 386       Wound 12/05/16 #4 right buttock (Active)   Wound Type Wound 10/31/2017 10:58 AM   Dressing Status Old drainage 10/31/2017 10:58 AM   Dressing Changed Changed/New 10/31/2017 10:58 AM   Wound Cleansed Rinsed/Irrigated with saline 10/31/2017 10:58 AM   Wound Length (cm) 7 cm 10/31/2017 11:18 AM   Wound Width (cm) 0.5 cm 10/31/2017 11:18 AM   Wound Depth (cm)  3.5 10/31/2017 11:18 AM   Calculated Wound Size (cm^2) (l*w) 3.5 cm^2 10/31/2017 11:18 AM   Change in Wound Size % (l*w) -2087.5 10/31/2017 11:18 AM   Distance Tunneling (cm) 9 cm 7/11/2017 10:03 AM   Tunneling Position ___ O'Clock 500 7/11/2017 10:03 AM   Wound Assessment Clean;Dry; Intact; Red 10/31/2017 10:58 AM   Margins Defined edges 10/31/2017 10:58 AM   Alayna-wound Assessment Clean;Dry; Intact 10/31/2017 10:58 AM   [place]%Wound Bed 0 8/29/2017 10:36 AM   Red%Wound Bed 100 10/31/2017 10:58 AM   Yellow%Wound Bed 0 10/31/2017 10:58 AM   Drainage Amount Moderate 10/31/2017 10:58 AM   Drainage Description Serosanguinous 10/31/2017 10:58 AM   Odor None 10/31/2017 10:58 AM   Culture Taken Yes 7/11/2017 10:03 AM   Debridement per physician Muscle 10/31/2017 11:18 AM   Time out Yes 10/31/2017 11:18 AM   Procedural Pain 0 10/31/2017 11:18 AM   Post procedural Pain 0 10/31/2017 11:18 AM   Number of days: 329        The patients pain is mild. Wound is good granulation and healing  Wound is has slightly improved. Assessment:     Procedure Note:Procedure Note: The wound is anesthetized with: lidocaine gel    Debridement: Excisional Debridement    Using curette the wound was sharply debrided    down through and including the removal of subcutaneous tissue. Devitalized Tissue Debrided:  slough. Bleeding: Minimal    Hemostasis:   by pressure    Response to treatment:  Well tolerated by patient. Post debridement wound description:  good granulation tissue  Post debridement Wound Location:buttocks right    32 y.o. male   Patient Active Problem List   Diagnosis    [facility]el[facility] ([facility] [address]    Congenital hydrocephalus ([facility] [address]    S/P  shunt    Scoliosis    Complex partial seizures with consciousness impaired ([address]    Chronic headaches    Wound of right buttock    Throat pain    Otitis    Epistaxis    Non-healing surgical wound    Pharyngeal ulcer       Plan:     Discharge Treatment  May Shower   Pack Wound:  wound vac at 200mmHg continuous     Multivitamin once daily    High Protein diet    Smoking cessation:   N/A    Discussed appropriate home care of this wound. Wound redressed. Patient instructions were given. Follow up: 1 week.            Electronically signed by Christian Obregon MD on 10/31/2017 at 11:23 AM

## 2017-11-07 ENCOUNTER — HOSPITAL ENCOUNTER (OUTPATIENT)
Dept: WOUND CARE | Age: 27
Discharge: HOME | End: 2017-11-07
Payer: COMMERCIAL

## 2017-11-07 VITALS
DIASTOLIC BLOOD PRESSURE: 87 MMHG | TEMPERATURE: 97.2 F | SYSTOLIC BLOOD PRESSURE: 133 MMHG | RESPIRATION RATE: 16 BRPM | HEART RATE: 105 BPM

## 2017-11-07 DIAGNOSIS — T81.89XD NON-HEALING SURGICAL WOUND, SUBSEQUENT ENCOUNTER: Primary | Chronic | ICD-10-CM

## 2017-11-07 LAB
ALBUMIN SERPL-MCNC: 4.3 G/DL (ref 3.5–5.2)
PREALBUMIN: 18.8 MG/DL (ref 20–40)
TOTAL PROTEIN: 7.5 G/DL (ref 6.4–8.3)

## 2017-11-07 PROCEDURE — 82040 ASSAY OF SERUM ALBUMIN: CPT

## 2017-11-07 PROCEDURE — 84155 ASSAY OF PROTEIN SERUM: CPT

## 2017-11-07 PROCEDURE — 6370000000 HC RX 637 (ALT 250 FOR IP): Performed by: SURGERY

## 2017-11-07 PROCEDURE — 84134 ASSAY OF PREALBUMIN: CPT

## 2017-11-07 PROCEDURE — 36415 COLL VENOUS BLD VENIPUNCTURE: CPT

## 2017-11-07 PROCEDURE — 11043 DBRDMT MUSC&/FSCA 1ST 20/<: CPT

## 2017-11-07 RX ADMIN — LIDOCAINE HYDROCHLORIDE: 20 JELLY TOPICAL at 10:44

## 2017-11-07 NOTE — PROGRESS NOTES
Wound Care Progress Note    Nourm G Freyer  AGE: 32 y.o. GENDER: male  : 1990  TODAY'S DATE:  2017    Subjective:   CHIEF COMPLAINT:  Right buttock wound      HISTORY of PRESENT ILLNESS HPI   Judd Hunt is a 32 y.o. male who presents today for wound evaluation.     Wound Type:pressure  Wound Location:buttocks  Modifying factors:chronic pressure      Diagnosis Date    Chronic headaches     Complex partial seizures with consciousness impaired (HCC)     Congenital hydrocephalus (HCC)     Dysplasia of hip     Esotropia, unspecified     corrective surgery     Myelomeningocele ([facility] [address]     L4 functional level    Neurogenic bladder     S/P  shunt     Scoliosis     Seizures (HCC)     Tethered cord ([facility] [address]     Release  and      Patient Active Problem List   Diagnosis Code    Myelomeningocele ([facility] [address] Q05.9    Congenital hydrocephalus (HCC) Q03.9    S/P  shunt Z98.2    Scoliosis M41.9    Complex partial seizures with consciousness impaired (HCC) G40.209    Chronic headaches R51    Wound of right buttock S31.819A    Throat pain R07.0    Otitis H66.90    Epistaxis R04.0    Non-healing surgical wound T81.89XA    Pharyngeal ulcer J39.2       ALLERGIES    Allergies   Allergen Reactions    Latex Swelling    Seasonal Itching    Ceclor [Cefaclor]     Other      banannas    Peanuts [Peanut Oil] Hives       Objective:      /87   Pulse 105   Temp 97.2 °F (36.2 °C) (Tympanic)   Resp 16     Pre Debridement Measurements:  Wound 10/10/16 #3 right outer buttock (Active)   Dressing Status Old drainage 10/31/2016 10:07 AM   Dressing Changed Changed/New 10/31/2016 10:07 AM   Wound Cleansed Rinsed/Irrigated with saline 10/31/2016 10:07 AM   Wound Length (cm) 0.2 cm 10/31/2016 10:25 AM   Wound Width (cm) 0.3 cm 10/31/2016 10:25 AM   Wound Depth (cm)  1.5 10/31/2016 10:25 AM   Calculated Wound Size (cm^2) (l*w) 0.06 cm^2 10/31/2016 10:25 AM   Change in Wound Size % (l*w) 87.5 wound is anesthetized with: lidocaine gel    Debridement: Excisional Debridement    Using curette the wound was sharply debrided    down through and including the removal of muscle/fascia. Devitalized Tissue Debrided:  fibrin, biofilm and slough. Bleeding: Minimal    Hemostasis:   by pressure    Response to treatment:  Well tolerated by patient. Post debridement wound description:  good granulation tissue, healing  Post debridement Wound Location:buttocks    32 y.o. male   Patient Active Problem List   Diagnosis    Myelomeningocele ([facility] [address]    Congenital hydrocephalus ([facility] [address]    S/P  shunt    Scoliosis    Complex partial seizures with consciousness impaired ([facility] [address]    Chronic headaches    Wound of right buttock    Throat pain    Otitis    Epistaxis    Non-healing surgical wound    Pharyngeal ulcer       Plan:     Discharge Treatment  May Shower   Pack Wound:  wound vac     Multivitamin once daily    High Protein diet    Smoking cessation:   N/A    Discussed appropriate home care of this wound. Wound redressed. Patient instructions were given. Follow up: 1 week.            Electronically signed by Melvin Mcnulty MD on 11/7/2017 at 11:07 AM

## 2017-11-14 ENCOUNTER — TELEPHONE (OUTPATIENT)
Dept: UROLOGY | Age: 27
End: 2017-11-14

## 2017-11-14 ENCOUNTER — HOSPITAL ENCOUNTER (OUTPATIENT)
Dept: WOUND CARE | Age: 27
Discharge: HOME | End: 2017-11-14
Payer: COMMERCIAL

## 2017-11-14 VITALS
SYSTOLIC BLOOD PRESSURE: 144 MMHG | RESPIRATION RATE: 16 BRPM | DIASTOLIC BLOOD PRESSURE: 92 MMHG | HEART RATE: 107 BPM | WEIGHT: 160 LBS | HEIGHT: 61 IN | BODY MASS INDEX: 30.21 KG/M2 | TEMPERATURE: 97.9 F

## 2017-11-14 PROCEDURE — 97605 NEG PRS WND THER DME<=50SQCM: CPT

## 2017-11-14 PROCEDURE — 6370000000 HC RX 637 (ALT 250 FOR IP): Performed by: SURGERY

## 2017-11-14 PROCEDURE — 11043 DBRDMT MUSC&/FSCA 1ST 20/<: CPT

## 2017-11-14 RX ADMIN — LIDOCAINE HYDROCHLORIDE: 20 JELLY TOPICAL at 11:03

## 2017-11-14 ASSESSMENT — PAIN SCALES - GENERAL: PAINLEVEL_OUTOF10: 0

## 2017-11-14 NOTE — PLAN OF CARE
Problem: Wound:  Goal: Will show signs of wound healing; wound closure and no evidence of infection  Will show signs of wound healing; wound closure and no evidence of infection   Outcome: Ongoing  Wound stable will cont wound vac

## 2017-11-14 NOTE — TELEPHONE ENCOUNTER
Left message to return call and schedule Cystoscopy instead of 11/20 yearly appt. See note from 10/17/16.

## 2017-11-14 NOTE — PROGRESS NOTES
AM   Calculated Wound Size (cm^2) (l*w) 0.06 cm^2 10/31/2016 10:25 AM   Change in Wound Size % (l*w) 87.5 10/31/2016 10:25 AM   Alayna-wound Assessment Clean;Dry; Intact 10/10/2016  9:43 AM   Red%Wound Bed 100 10/31/2016 10:07 AM   Yellow%Wound Bed 90 10/10/2016  9:43 AM   Drainage Amount Moderate 10/31/2016 10:07 AM   Drainage Description Serosanguinous 10/31/2016 10:07 AM   Odor None 10/31/2016 10:07 AM   Debridement per physician Subcutaneous 10/31/2016 10:25 AM   Time out Yes 10/31/2016 10:25 AM   Procedural Pain 0 10/31/2016 10:25 AM   Post procedural Pain 0 10/31/2016 10:25 AM   Number of days: 400       Wound 12/05/16 #4 right buttock (Active)   Wound Type Wound 11/14/2017 10:59 AM   Dressing Status Old drainage 11/14/2017 10:59 AM   Dressing Changed Changed/New 11/14/2017 10:59 AM   Wound Cleansed Rinsed/Irrigated with saline 11/14/2017 10:59 AM   Wound Length (cm) 6.5 cm 11/14/2017 11:17 AM   Wound Width (cm) 1.1 cm 11/14/2017 11:17 AM   Wound Depth (cm)  4.0 11/14/2017 11:17 AM   Calculated Wound Size (cm^2) (l*w) 7.15 cm^2 11/14/2017 11:17 AM   Change in Wound Size % (l*w) -4368.75 11/14/2017 11:17 AM   Distance Tunneling (cm) 9 cm 7/11/2017 10:03 AM   Tunneling Position ___ O'Clock 500 7/11/2017 10:03 AM   Wound Assessment Granulation tissue 11/14/2017 10:59 AM   Margins Epibole (rolled edges) 11/14/2017 10:59 AM   Alayna-wound Assessment Clean; Intact; Maceration 11/14/2017 10:59 AM   [place]%Wound Bed 0 8/29/2017 10:36 AM   Red%Wound Bed 100 11/14/2017 10:59 AM   Yellow%Wound Bed 0 10/31/2017 10:58 AM   Drainage Amount Moderate 11/14/2017 10:59 AM   Drainage Description Serosanguinous 11/14/2017 10:59 AM   Odor Mild 11/14/2017 10:59 AM   Culture Taken Yes 7/11/2017 10:03 AM   Debridement per physician Muscle 11/14/2017 11:17 AM   Time out Yes 11/14/2017 11:17 AM   Procedural Pain 0 11/14/2017 11:17 AM   Post procedural Pain 0 11/14/2017 11:17 AM   Number of days: 344        The patients pain is mild.   Wound is

## 2017-11-20 ENCOUNTER — OFFICE VISIT (OUTPATIENT)
Dept: UROLOGY | Age: 27
End: 2017-11-20
Payer: COMMERCIAL

## 2017-11-20 VITALS
DIASTOLIC BLOOD PRESSURE: 82 MMHG | BODY MASS INDEX: 31.42 KG/M2 | HEART RATE: 95 BPM | WEIGHT: 160.05 LBS | SYSTOLIC BLOOD PRESSURE: 117 MMHG | HEIGHT: 60 IN | TEMPERATURE: 98.1 F

## 2017-11-20 DIAGNOSIS — R33.9 RETENTION, URINE: ICD-10-CM

## 2017-11-20 DIAGNOSIS — N31.9 NEUROGENIC BLADDER: Primary | ICD-10-CM

## 2017-11-20 PROCEDURE — 1036F TOBACCO NON-USER: CPT | Performed by: UROLOGY

## 2017-11-20 PROCEDURE — 99213 OFFICE O/P EST LOW 20 MIN: CPT | Performed by: UROLOGY

## 2017-11-20 PROCEDURE — G8417 CALC BMI ABV UP PARAM F/U: HCPCS | Performed by: UROLOGY

## 2017-11-20 PROCEDURE — G8484 FLU IMMUNIZE NO ADMIN: HCPCS | Performed by: UROLOGY

## 2017-11-20 PROCEDURE — G8427 DOCREV CUR MEDS BY ELIG CLIN: HCPCS | Performed by: UROLOGY

## 2017-11-20 ASSESSMENT — ENCOUNTER SYMPTOMS
NAUSEA: 0
COLOR CHANGE: 0
EYE PAIN: 0
ABDOMINAL PAIN: 0
VOMITING: 0
WHEEZING: 0
BACK PAIN: 0
SHORTNESS OF BREATH: 0
EYE REDNESS: 0
COUGH: 0

## 2017-11-20 NOTE — PROGRESS NOTES
Neurogenic bladder     S/P  shunt     Scoliosis     Seizures ([facility]     Tethered cord ([facility]     Release 1993 and 1996     Past Surgical History:   Procedure Laterality Date    CYST INCISION AND DRAINAGE  01/10/2017    unroofing of chronic sinus tract right buttocks.  CYSTOSCOPY  5/29/15    EYE SURGERY      OTHER SURGICAL HISTORY Right 01/10/2017    unroofing buttock wound right side     SKIN BIOPSY Right 10/2/2017    EXCISION DEBRIDEMENT BUTTOCK WOUND WITH APPLICATION OF WOUND VAC performed by Marleen Chicas MD at [address] [place] [address] Se      last revised 1999     Family History   Problem Relation Age of Onset    Heart Disease Father     Heart Surgery Father     Hypertension Father     Heart Disease Paternal Grandfather     Heart Failure Paternal Grandfather     Osteoarthritis Mother     Liver Disease Maternal Grandmother     Heart Attack Maternal Grandfather      Outpatient Prescriptions Marked as Taking for the 11/20/17 encounter (Office Visit) with Sarah Cain MD   Medication Sig Dispense Refill    Gauze Pads & Dressings (GAUZE DRESSING) 4\"X4\" PADS 1 each by Does not apply route daily 30 each 5    docusate sodium (COLACE) 100 MG capsule Take 1 capsule by mouth 2 times daily 30 capsule 2    Gauze Pads & Dressings (NU GAUZE PACKING STRIPS) MISC 1/4 inch plain packing gauze wet to dry to right buttock wound once daily cover with dry dressing 1 each 1    SODIUM CHLORIDE, EXTERNAL, ([facility] SALINE WOUND WASH) 0.9 % SOLN Apply 5 mLs topically daily 210 mL 1    loratadine (CLARITIN) 10 MG tablet Take 1 tablet by mouth daily 30 tablet 11    Multiple Vitamins-Minerals (THERAPEUTIC MULTIVITAMIN-MINERALS) tablet Take 1 tablet by mouth daily      EPINEPHrine (EPIPEN 2-ERIKA) 0.3 MG/0.3ML SOAJ injection Inject as needed 2 each 1    Riboflavin (VITAMIN B-2 PO) Take 1 tablet by mouth         Latex; Seasonal; Ceclor [cefaclor];  Other; and Peanuts [peanut oil]  History   Smoking Status    Never Smoker   Smokeless Tobacco    Never Used     (If patient a smoker, smoking cessation counseling offered)    History   Alcohol Use No       REVIEW OF SYSTEMS:  Review of Systems   Constitutional: Negative for appetite change, chills and fever. Eyes: Negative for pain, redness and visual disturbance. Respiratory: Negative for cough, shortness of breath and wheezing. Cardiovascular: Negative for chest pain and leg swelling. Gastrointestinal: Negative for abdominal pain, nausea and vomiting. Genitourinary: Positive for difficulty urinating. Negative for discharge, dysuria, flank pain, frequency, hematuria, scrotal swelling, testicular pain and urgency. Musculoskeletal: Negative for back pain, joint swelling and myalgias. Skin: Positive for wound (buttox). Negative for color change and rash. Neurological: Negative for dizziness, tremors and numbness. Hematological: Negative for adenopathy. Does not bruise/bleed easily. Physical Exam:      Vitals:    11/20/17 1002   BP: 117/82   Pulse: 95   Temp: 98.1 °F (36.7 °C)     Body mass index is 31.26 kg/m². Patient is a 32 y.o. male in no acute distress and alert and oriented to person, place and time. Physical Exam  Constitutional: Patient in no acute distress. Neuro: Alert and oriented to person, place and time. Psych: Mood normal, affect normal  Skin: No rash noted  HEENT: Head: Normocephalic and atraumatic  Conjunctivae and EOM are normal. Pupils are equal, round  Nose: Normal  Right External Ear: Normal; Left External Ear: Normal  Mouth: Mucosa Moist  Neck: Supple  Lungs: Respiratory effort is normal  Cardiovascular: Warm & Pink  Abdomen: Soft, non-tender, non-distended with no CVA,  No flank tenderness,  Or hepatosplenomegaly   Lymphatics: No palpable lymphadenopathy. Bladder non-tender and not distended. Musculoskeletal: Normal gait and station      Assessment and Plan      1. Neurogenic bladder    2. Retention, urine           Plan:   Cont cic  F/u 1 year with cysto (surveillance for scc/chronic cath)     Return in about 1 year (around 11/20/2018) for Cystoscopy. Prescriptions Ordered:  No orders of the defined types were placed in this encounter. Orders Placed:  No orders of the defined types were placed in this encounter.          Adela Olivera MD

## 2017-11-28 ENCOUNTER — HOSPITAL ENCOUNTER (OUTPATIENT)
Dept: WOUND CARE | Age: 27
Discharge: HOME | End: 2017-11-28
Payer: COMMERCIAL

## 2017-11-29 ENCOUNTER — HOSPITAL ENCOUNTER (OUTPATIENT)
Dept: WOUND CARE | Age: 27
Discharge: HOME | End: 2017-11-29
Payer: COMMERCIAL

## 2017-11-29 VITALS
HEART RATE: 101 BPM | TEMPERATURE: 97.6 F | DIASTOLIC BLOOD PRESSURE: 86 MMHG | SYSTOLIC BLOOD PRESSURE: 145 MMHG | RESPIRATION RATE: 16 BRPM

## 2017-11-29 PROCEDURE — 86403 PARTICLE AGGLUT ANTBDY SCRN: CPT

## 2017-11-29 PROCEDURE — 87205 SMEAR GRAM STAIN: CPT

## 2017-11-29 PROCEDURE — 87070 CULTURE OTHR SPECIMN AEROBIC: CPT

## 2017-11-29 PROCEDURE — 87075 CULTR BACTERIA EXCEPT BLOOD: CPT

## 2017-11-29 PROCEDURE — 87077 CULTURE AEROBIC IDENTIFY: CPT

## 2017-11-29 PROCEDURE — 87176 TISSUE HOMOGENIZATION CULTR: CPT

## 2017-11-29 PROCEDURE — 6370000000 HC RX 637 (ALT 250 FOR IP): Performed by: SURGERY

## 2017-11-29 PROCEDURE — 11043 DBRDMT MUSC&/FSCA 1ST 20/<: CPT

## 2017-11-29 PROCEDURE — 87185 SC STD ENZYME DETCJ PER NZM: CPT

## 2017-11-29 RX ORDER — SODIUM HYPOCHLORITE 1.25 MG/ML
SOLUTION TOPICAL
Qty: 1 BOTTLE | Refills: 1 | Status: SHIPPED | OUTPATIENT
Start: 2017-11-29 | End: 2018-06-06

## 2017-11-29 RX ADMIN — LIDOCAINE HYDROCHLORIDE: 20 JELLY TOPICAL at 10:09

## 2017-11-29 NOTE — PROGRESS NOTES
10/31/2016 10:25 AM   Calculated Wound Size (cm^2) (l*w) 0.06 cm^2 10/31/2016 10:25 AM   Change in Wound Size % (l*w) 87.5 10/31/2016 10:25 AM   Drainage Amount Moderate 10/31/2016 10:07 AM   Drainage Description Serosanguinous 10/31/2016 10:07 AM   Odor None 10/31/2016 10:07 AM   Alayna-wound Assessment Clean;Dry; Intact 10/10/2016  9:43 AM   Red%Wound Bed 100 10/31/2016 10:07 AM   Yellow%Wound Bed 90 10/10/2016  9:43 AM   Debridement per physician Subcutaneous 10/31/2016 10:25 AM   Time out Yes 10/31/2016 10:25 AM   Procedural Pain 0 10/31/2016 10:25 AM   Post procedural Pain 0 10/31/2016 10:25 AM   Number of days: 415       Wound 12/05/16 #4 right buttock (Active)   Wound Type Wound 11/29/2017  9:52 AM   Dressing Status Old drainage 11/29/2017  9:52 AM   Dressing Changed Changed/New 11/29/2017  9:52 AM   Wound Cleansed Rinsed/Irrigated with saline; Wound cleanser 11/29/2017  9:52 AM   Wound Length (cm) 5 cm 11/29/2017  9:52 AM   Wound Width (cm) 0.5 cm 11/29/2017  9:52 AM   Wound Depth (cm)  4.1 11/29/2017  9:52 AM   Calculated Wound Size (cm^2) (l*w) 2.5 cm^2 11/29/2017  9:52 AM   Change in Wound Size % (l*w) -1462.5 11/29/2017  9:52 AM   Distance Tunneling (cm) 9 cm 7/11/2017 10:03 AM   Tunneling Position ___ O'Clock 500 7/11/2017 10:03 AM   Wound Assessment Granulation tissue 11/29/2017  9:52 AM   Drainage Amount Moderate 11/29/2017  9:52 AM   Drainage Description Serosanguinous 11/29/2017  9:52 AM   Odor Strong 11/29/2017  9:52 AM   Margins Epibole (rolled edges) 11/29/2017  9:52 AM   Alayna-wound Assessment Clean;Dry; Intact 11/29/2017  9:52 AM   [place]%Wound Bed 0 8/29/2017 10:36 AM   Red%Wound Bed 80 11/29/2017  9:52 AM   Yellow%Wound Bed 20 11/29/2017  9:52 AM   Culture Taken Yes 7/11/2017 10:03 AM   Debridement per physician Muscle 11/14/2017 11:17 AM   Time out Yes 11/14/2017 11:17 AM   Procedural Pain 0 11/14/2017 11:17 AM   Post procedural Pain 0 11/14/2017 11:17 AM   Number of days: 358        The patients pain is mild. Wound is clean, good granulation and healing  Wound is has improved. Assessment:     Procedure Note:Procedure Note: The wound is anesthetized with: lidocaine gel    Debridement: Excisional Debridement    Using curette the wound was sharply debrided    down through and including the removal of subcutaneous tissue. Devitalized Tissue Debrided:  fibrin, biofilm and slough. Bleeding: Minimal    Hemostasis:   by pressure    Response to treatment:  Well tolerated by patient. Post debridement wound description:  clean, good granulation tissue, healing  Post debridement Wound Location:buttocks    32 y.o. male   Patient Active Problem List   Diagnosis    Myelom[facility]cele ([facility] [address]    Congenital hydrocephalus ([facility] [address]    S/P  shunt    Scoliosis    Complex partial seizures with consciousness impaired ([facility] [address]    Chronic headaches    Wound of right buttock    Throat pain    Otitis    Epistaxis    Non-healing surgical wound    Pharyngeal ulcer       Plan:     Discharge Treatment  Cleanse wound with mild soap and water   Primary Dressing:  hold vac, apply dakins moistened gauze to wound daily, cover with dry gauze dressing     Multivitamin once daily    High Protein diet    Tissue culture obtained, follow for results    Discussed appropriate home care of this wound. Wound redressed. Patient instructions were given. Follow up: 1 week.            Electronically signed by Marc Erickson DO on 11/29/2017 at 10:26 AM

## 2017-11-29 NOTE — PLAN OF CARE
Problem: Wound:  Goal: Will show signs of wound healing; wound closure and no evidence of infection  Will show signs of wound healing; wound closure and no evidence of infection   Outcome: Ongoing  Wound has strong odor, culture done vac on hold

## 2017-12-03 LAB
CULTURE: ABNORMAL
DIRECT EXAM: ABNORMAL
DIRECT EXAM: ABNORMAL
Lab: ABNORMAL
SPECIMEN DESCRIPTION: ABNORMAL
SPECIMEN DESCRIPTION: ABNORMAL
STATUS: ABNORMAL

## 2017-12-05 ENCOUNTER — HOSPITAL ENCOUNTER (OUTPATIENT)
Dept: WOUND CARE | Age: 27
Discharge: HOME | End: 2017-12-05
Payer: COMMERCIAL

## 2017-12-05 VITALS
DIASTOLIC BLOOD PRESSURE: 82 MMHG | SYSTOLIC BLOOD PRESSURE: 150 MMHG | HEIGHT: 60 IN | RESPIRATION RATE: 16 BRPM | WEIGHT: 160 LBS | TEMPERATURE: 97.4 F | HEART RATE: 99 BPM | BODY MASS INDEX: 31.41 KG/M2

## 2017-12-05 PROCEDURE — 6370000000 HC RX 637 (ALT 250 FOR IP): Performed by: SURGERY

## 2017-12-05 PROCEDURE — 11043 DBRDMT MUSC&/FSCA 1ST 20/<: CPT

## 2017-12-05 RX ADMIN — LIDOCAINE HYDROCHLORIDE: 20 JELLY TOPICAL at 10:11

## 2017-12-05 NOTE — PROGRESS NOTES
cm^2 10/31/2016 10:25 AM   Change in Wound Size % (l*w) 87.5 10/31/2016 10:25 AM   Drainage Amount Moderate 10/31/2016 10:07 AM   Drainage Description Serosanguinous 10/31/2016 10:07 AM   Odor None 10/31/2016 10:07 AM   Alayna-wound Assessment Clean;Dry; Intact 10/10/2016  9:43 AM   Red%Wound Bed 100 10/31/2016 10:07 AM   Yellow%Wound Bed 90 10/10/2016  9:43 AM   Debridement per physician Subcutaneous 10/31/2016 10:25 AM   Time out Yes 10/31/2016 10:25 AM   Procedural Pain 0 10/31/2016 10:25 AM   Post procedural Pain 0 10/31/2016 10:25 AM   Number of days: 421       Wound 12/05/16 #4 right buttock (Active)   Wound Type Wound 12/5/2017  9:58 AM   Dressing Status Old drainage 12/5/2017  9:58 AM   Dressing Changed Changed/New 12/5/2017  9:58 AM   Wound Cleansed Rinsed/Irrigated with saline 12/5/2017  9:58 AM   Wound Length (cm) 1 cm 12/5/2017  9:58 AM   Wound Width (cm) 4.5 cm 12/5/2017  9:58 AM   Wound Depth (cm)  2.5 12/5/2017  9:58 AM   Calculated Wound Size (cm^2) (l*w) 4.5 cm^2 12/5/2017  9:58 AM   Change in Wound Size % (l*w) -2712.5 12/5/2017  9:58 AM   Distance Tunneling (cm) 9 cm 7/11/2017 10:03 AM   Tunneling Position ___ O'Clock 500 7/11/2017 10:03 AM   Wound Assessment Granulation tissue; Red 12/5/2017  9:58 AM   Drainage Amount Moderate 12/5/2017  9:58 AM   Drainage Description Serosanguinous 12/5/2017  9:58 AM   Odor None 12/5/2017  9:58 AM   Margins Epibole (rolled edges) 12/5/2017  9:58 AM   Alayna-wound Assessment Clean; Intact; Maceration;Pink; White 12/5/2017  9:58 AM   [place]%Wound Bed 0 8/29/2017 10:36 AM   Red%Wound Bed 100 12/5/2017  9:58 AM   Yellow%Wound Bed 0 12/5/2017  9:58 AM   Culture Taken Yes 7/11/2017 10:03 AM   Debridement per physician Muscle 11/29/2017 10:26 AM   Time out Yes 11/29/2017 10:26 AM   Procedural Pain 0 11/29/2017 10:26 AM   Post procedural Pain 0 11/29/2017 10:26 AM   Number of days: 799        The patients pain is mild.   Wound is good granulation and healing  Wound is has slightly improved. Assessment:     Procedure Note:Procedure Note: The wound is anesthetized with: lidocaine gel    Debridement: Excisional Debridement    Using curette the wound was sharply debrided    down through and including the removal of muscle/fascia. Devitalized Tissue Debrided:  biofilm and slough. Bleeding: Minimal    Hemostasis:   by pressure    Response to treatment:  Well tolerated by patient. Post debridement wound description:  good granulation tissue, healing  Post debridement Wound Location:buttocks    32 y.o. male   Patient Active Problem List   Diagnosis    Myelom[facility]cele ([facility] [address]    Congenital hydrocephalus ([facility] [address]    S/P  shunt    Scoliosis    Complex partial seizures with consciousness impaired ([facility] [address]    Chronic headaches    Wound of right buttock    Throat pain    Otitis    Epistaxis    Non-healing surgical wound    Pharyngeal ulcer       Plan:     Discharge Treatment  May Shower   Pack Wound:  Dakin's 1/4 strength daily     Multivitamin once daily    High Protein diet    Smoking cessation:   N/A    Discussed appropriate home care of this wound. Wound redressed. Patient instructions were given. Follow up: 1 week.            Electronically signed by Mary Ornelas MD on 12/5/2017 at 10:27 AM

## 2017-12-12 ENCOUNTER — HOSPITAL ENCOUNTER (OUTPATIENT)
Dept: WOUND CARE | Age: 27
Discharge: HOME | End: 2017-12-12
Payer: COMMERCIAL

## 2017-12-12 VITALS
BODY MASS INDEX: 31.41 KG/M2 | HEIGHT: 60 IN | TEMPERATURE: 97.4 F | WEIGHT: 160 LBS | HEART RATE: 94 BPM | RESPIRATION RATE: 16 BRPM | SYSTOLIC BLOOD PRESSURE: 137 MMHG | DIASTOLIC BLOOD PRESSURE: 80 MMHG

## 2017-12-12 DIAGNOSIS — L89.313 DECUBITUS ULCER OF RIGHT BUTTOCK, STAGE 3 (HCC): Chronic | ICD-10-CM

## 2017-12-12 PROCEDURE — 6370000000 HC RX 637 (ALT 250 FOR IP): Performed by: SURGERY

## 2017-12-12 PROCEDURE — 11043 DBRDMT MUSC&/FSCA 1ST 20/<: CPT

## 2017-12-12 RX ADMIN — LIDOCAINE HYDROCHLORIDE: 20 JELLY TOPICAL at 09:49

## 2017-12-12 NOTE — PLAN OF CARE
Problem: Wound:  Goal: Will show signs of wound healing; wound closure and no evidence of infection  Will show signs of wound healing; wound closure and no evidence of infection   Outcome: Ongoing  Wound clean without odor will reapply wound vac today at 175

## 2017-12-12 NOTE — PROGRESS NOTES
Wound Care Progress Note    Nourm G Freyer  AGE: 32 y.o. GENDER: male  : 1990  TODAY'S DATE:  2017    Subjective:   CHIEF COMPLAINT:  Right buttock wound      HISTORY of PRESENT ILLNESS HPI   Judd Buenrostro is a 32 y.o. male who presents today for wound evaluation.     Wound Type:pressure and non-healing surgical  Wound Location:buttocks  Modifying factors:chronic pressure      Diagnosis Date    Chronic headaches     Complex partial seizures with consciousness impaired (HCC)     Congenital hydrocephalus (HCC)     Dysplasia of hip     Esotropia, unspecified     corrective surgery     [facility]el[facility] [address]     L4 functional level    Neurogenic bladder     S/P  shunt     Scoliosis     Seizures (HCC)     Tethered cord ([facility] [address]     Release  and      Patient Active Problem List   Diagnosis Code    Myelomeningocele ([facility] [address] Q05.9    Congenital hydrocephalus (HCC) Q03.9    S/P  shunt Z98.2    Scoliosis M41.9    Complex partial seizures with consciousness impaired (HCC) G40.209    Chronic headaches R51    Wound of right buttock S31.819A    Non-healing surgical wound T81.89XA    Decubitus ulcer of right buttock, stage 3 (HCC) L89.313       ALLERGIES    Allergies   Allergen Reactions    Latex Swelling    Seasonal Itching    Ceclor [Cefaclor]     Other      banannas    Peanuts [Peanut Oil] Hives       Objective:      /80   Pulse 94   Temp 97.4 °F (36.3 °C) (Tympanic)   Resp 16   Ht 5' (1.524 m)   Wt 160 lb (72.6 kg)   BMI 31.25 kg/m²     Pre Debridement Measurements:  Wound 10/10/16 #3 right outer buttock (Active)   Dressing Status Old drainage 10/31/2016 10:07 AM   Dressing Changed Changed/New 10/31/2016 10:07 AM   Wound Cleansed Rinsed/Irrigated with saline 10/31/2016 10:07 AM   Wound Length (cm) 0.2 cm 10/31/2016 10:25 AM   Wound Width (cm) 0.3 cm 10/31/2016 10:25 AM   Wound Depth (cm)  1.5 10/31/2016 10:25 AM   Calculated Wound Size (cm^2) (l*w) 0.06 cm^2 10/31/2016 10:25 AM   Change in Wound Size % (l*w) 87.5 10/31/2016 10:25 AM   Drainage Amount Moderate 10/31/2016 10:07 AM   Drainage Description Serosanguinous 10/31/2016 10:07 AM   Odor None 10/31/2016 10:07 AM   Alayna-wound Assessment Clean;Dry; Intact 10/10/2016  9:43 AM   Red%Wound Bed 100 10/31/2016 10:07 AM   Yellow%Wound Bed 90 10/10/2016  9:43 AM   Debridement per physician Subcutaneous 10/31/2016 10:25 AM   Time out Yes 10/31/2016 10:25 AM   Procedural Pain 0 10/31/2016 10:25 AM   Post procedural Pain 0 10/31/2016 10:25 AM   Number of days: 428       Wound 12/05/16 #4 right buttock (Active)   Wound Type Wound 12/12/2017  9:42 AM   Dressing Status Old drainage 12/12/2017  9:42 AM   Dressing Changed Changed/New 12/5/2017  9:58 AM   Wound Cleansed Rinsed/Irrigated with saline 12/5/2017  9:58 AM   Wound Length (cm) 1 cm 12/12/2017 10:34 AM   Wound Width (cm) 4 cm 12/12/2017 10:34 AM   Wound Depth (cm)  3.6 12/12/2017 10:34 AM   Calculated Wound Size (cm^2) (l*w) 4 cm^2 12/12/2017 10:34 AM   Change in Wound Size % (l*w) -2400 12/12/2017 10:34 AM   Distance Tunneling (cm) 9 cm 7/11/2017 10:03 AM   Tunneling Position ___ O'Clock 500 7/11/2017 10:03 AM   Undermining Starts ___ O'Clock 5 12/12/2017  9:42 AM   Undermining Ends___ O'Clock 7 12/12/2017  9:42 AM   Undermining Maxium Distance (cm) [email or web address] 12/12/2017  9:42 AM   Wound Assessment Red; White 12/12/2017  9:42 AM   Drainage Amount Moderate 12/12/2017  9:42 AM   Drainage Description Serosanguinous 12/12/2017  9:42 AM   Odor None 12/12/2017  9:42 AM   Margins Attached edges;Epibole (rolled edges) 12/12/2017  9:42 AM   Alayna-wound Assessment Maceration;Pink 12/12/2017  9:42 AM   [place]%Wound Bed 0 8/29/2017 10:36 AM   Red%Wound Bed 90 12/12/2017  9:42 AM   Yellow%Wound Bed 0 12/12/2017  9:42 AM   Culture Taken Yes 7/11/2017 10:03 AM   Debridement per physician Muscle 12/5/2017 10:06 AM   Time out Yes 12/5/2017 10:06 AM   Procedural Pain 0 12/5/2017 10:06 AM   Post procedural Pain 0 12/5/2017 10:06 AM   Number of days: 372        The patients pain is mild. Wound is good granulation and healing  Wound is has slightly improved. Assessment:     Procedure Note:Procedure Note: The wound is anesthetized with: lidocaine gel    Debridement: Excisional Debridement    Using curette the wound was sharply debrided    down through and including the removal of subcutaneous tissue. Devitalized Tissue Debrided:  biofilm and slough. Bleeding: Minimal    Hemostasis:   by pressure    Response to treatment:  Well tolerated by patient. Post debridement wound description:  open, good granulation tissue, healing  Post debridement Wound Location:buttocks    32 y.o. male   Patient Active Problem List   Diagnosis    Myelom[address]ocele ([address]    Congenital hydrocephalus ([address]    S/P  shunt    Scoliosis    Complex partial seizures with consciousness impaired ([address]    Chronic headaches    Wound of right buttock    Non-healing surgical wound    Decubitus ulcer of right buttock, stage 3 ([address]       Plan:     Discharge Treatment  May Shower   Wound Negative Pressure:  black sponge at 175mmHG     Multivitamin once daily    High Protein diet    Smoking cessation:   N/A    Discussed appropriate home care of this wound. Wound redressed. Patient instructions were given. Follow up: 1 week.            Electronically signed by Clayton Keenan MD on 12/12/2017 at 10:38 AM

## 2017-12-12 NOTE — PLAN OF CARE
Problem: Wound:  Goal: Will show signs of wound healing; wound closure and no evidence of infection  Will show signs of wound healing; wound closure and no evidence of infection   Outcome: Ongoing  Wound stable will re start wound vac

## 2017-12-19 ENCOUNTER — HOSPITAL ENCOUNTER (OUTPATIENT)
Dept: WOUND CARE | Age: 27
Discharge: HOME | End: 2017-12-19
Payer: COMMERCIAL

## 2017-12-19 VITALS
WEIGHT: 160 LBS | BODY MASS INDEX: 31.41 KG/M2 | TEMPERATURE: 96.6 F | RESPIRATION RATE: 16 BRPM | HEART RATE: 109 BPM | HEIGHT: 60 IN | SYSTOLIC BLOOD PRESSURE: 133 MMHG | DIASTOLIC BLOOD PRESSURE: 80 MMHG

## 2017-12-19 PROCEDURE — 6370000000 HC RX 637 (ALT 250 FOR IP): Performed by: SURGERY

## 2017-12-19 PROCEDURE — 11042 DBRDMT SUBQ TIS 1ST 20SQCM/<: CPT

## 2017-12-19 RX ADMIN — LIDOCAINE HYDROCHLORIDE: 20 JELLY TOPICAL at 10:05

## 2017-12-19 ASSESSMENT — PAIN SCALES - GENERAL: PAINLEVEL_OUTOF10: 0

## 2017-12-19 NOTE — PROGRESS NOTES
10/31/2016 10:25 AM   Change in Wound Size % (l*w) 87.5 10/31/2016 10:25 AM   Drainage Amount Moderate 10/31/2016 10:07 AM   Drainage Description Serosanguinous 10/31/2016 10:07 AM   Odor None 10/31/2016 10:07 AM   Alayna-wound Assessment Clean;Dry; Intact 10/10/2016  9:43 AM   Red%Wound Bed 100 10/31/2016 10:07 AM   Yellow%Wound Bed 90 10/10/2016  9:43 AM   Debridement per physician Subcutaneous 10/31/2016 10:25 AM   Time out Yes 10/31/2016 10:25 AM   Procedural Pain 0 10/31/2016 10:25 AM   Post procedural Pain 0 10/31/2016 10:25 AM   Number of days: 435       Wound 12/05/16 #4 right buttock (Active)   Wound Type Wound 12/19/2017  9:59 AM   Dressing Status Old drainage 12/19/2017  9:59 AM   Dressing Changed Changed/New 12/19/2017  9:59 AM   Wound Cleansed Rinsed/Irrigated with saline 12/19/2017  9:59 AM   Wound Length (cm) 0.6 cm 12/19/2017 10:10 AM   Wound Width (cm) 3 cm 12/19/2017 10:10 AM   Wound Depth (cm)  2.5 12/19/2017 10:10 AM   Calculated Wound Size (cm^2) (l*w) 1.8 cm^2 12/19/2017 10:10 AM   Change in Wound Size % (l*w) -1025 12/19/2017 10:10 AM   Distance Tunneling (cm) 9 cm 7/11/2017 10:03 AM   Tunneling Position ___ O'Clock 500 7/11/2017 10:03 AM   Undermining Starts ___ O'Clock 5 12/12/2017  9:42 AM   Undermining Ends___ O'Clock 7 12/12/2017  9:42 AM   Undermining Maxium Distance (cm) [email or web address] 12/12/2017  9:42 AM   Wound Assessment Clean; Intact; Red 12/19/2017  9:59 AM   Drainage Amount Moderate 12/19/2017  9:59 AM   Drainage Description Serosanguinous 12/12/2017  9:42 AM   Odor None 12/19/2017  9:59 AM   Margins Attached edges;Epibole (rolled edges) 12/19/2017  9:59 AM   Alayna-wound Assessment Clean; Intact; Maceration 12/19/2017  9:59 AM   [place]%Wound Bed 0 8/29/2017 10:36 AM   Red%Wound Bed 100 12/19/2017  9:59 AM   Yellow%Wound Bed 0 12/19/2017  9:59 AM   Culture Taken Yes 7/11/2017 10:03 AM   Debridement per physician Muscle 12/19/2017 10:10 AM   Time out Yes 12/19/2017 10:10 AM   Procedural Pain 0 12/19/2017 10:10 AM   Post procedural Pain 0 12/19/2017 10:10 AM   Number of days: 378        The patients pain is mild. Wound is good granulation and healing  Wound is has significantly  improved. Assessment:     Procedure Note:Procedure Note: The wound is anesthetized with: lidocaine gel    Debridement: Excisional Debridement    Using curette the wound was sharply debrided    down through and including the removal of subcutaneous tissue. Devitalized Tissue Debrided:  biofilm and slough. Bleeding: Minimal    Hemostasis:   by pressure    Response to treatment:  Well tolerated by patient. Post debridement wound description:  clean  Post debridement Wound Location:buttocks    32 y.o. male   Patient Active Problem List   Diagnosis    Myelomeningocele ([address]    Congenital hydrocephalus ([address]    S/P  shunt    Scoliosis    Complex partial seizures with consciousness impaired ([address]    Chronic headaches    Wound of right buttock    Non-healing surgical wound    Decubitus ulcer of right buttock, stage 3 ([address]       Plan:     Discharge Treatment  Keep wound dry in shower:  as much as possible  Wound Negative Pressure:  175mmHg     Multivitamin once daily    High Protein diet    Smoking cessation:   N/A    Discussed appropriate home care of this wound. Wound redressed. Patient instructions were given. Follow up: 1 week.            Electronically signed by Judith Howell MD on 12/19/2017 at 10:13 AM

## 2017-12-29 ENCOUNTER — HOSPITAL ENCOUNTER (OUTPATIENT)
Dept: WOUND CARE | Age: 27
Discharge: HOME | End: 2017-12-29
Payer: COMMERCIAL

## 2018-01-02 ENCOUNTER — HOSPITAL ENCOUNTER (OUTPATIENT)
Dept: WOUND CARE | Age: 28
Discharge: HOME | End: 2018-01-02
Payer: COMMERCIAL

## 2018-01-02 VITALS
WEIGHT: 160 LBS | HEART RATE: 102 BPM | RESPIRATION RATE: 18 BRPM | BODY MASS INDEX: 31.41 KG/M2 | TEMPERATURE: 96.6 F | HEIGHT: 60 IN

## 2018-01-02 PROCEDURE — 6370000000 HC RX 637 (ALT 250 FOR IP): Performed by: NURSE PRACTITIONER

## 2018-01-02 PROCEDURE — 11042 DBRDMT SUBQ TIS 1ST 20SQCM/<: CPT | Performed by: NURSE PRACTITIONER

## 2018-01-02 PROCEDURE — 11042 DBRDMT SUBQ TIS 1ST 20SQCM/<: CPT

## 2018-01-02 RX ADMIN — LIDOCAINE HYDROCHLORIDE: 20 JELLY TOPICAL at 11:13

## 2018-01-02 NOTE — PLAN OF CARE
Problem: Wound:  Goal: Will show signs of wound healing; wound closure and no evidence of infection  Will show signs of wound healing; wound closure and no evidence of infection   Outcome: Ongoing  No signs of infection noted, debridement completed    Problem: Falls - Risk of:  Goal: Will remain free from falls  Will remain free from falls   Outcome: Met This Shift  Safety maintained no falls this visit

## 2018-01-02 NOTE — PROGRESS NOTES
[facility] Outpatient Wound Care Progress Note    Judd Alvarez  AGE: 32 y.o. GENDER: male  : 1990  TODAY'S DATE:  2018  Patient's PCP is Isrrael Adorno CNP  Subjective:   CHIEF COMPLAINT:  Right buttock decubitus wound      HISTORY of PRESENT ILLNESS HPI   Judd Alvarez is a 32 y.o. male who presents today for wound evaluation. Wound Type:pressure  Wound Location:right buttock  Modifying factors:chronic pressure   32year old male managed by Dr Jesi Lucia for wound care with chronic pressure ulcer to right buttock. He had an excisional debridement of right buttock wound and placement of wound vac on 10/2 by Dr Jesi Lucia and is currently continuing to use the wound vac. Diagnosis Date    Chronic headaches     Complex partial seizures with consciousness impaired ([address]     [facility]us ([facility])     Dysplasia of hip     Esotropia, unspecified     corrective surgery     Myelomeningocele ([address]     L4 functional level    Neurogenic bladder     S/P  shunt     Scoliosis     Seizures ([address]     Tethered cord ([address]     Release  and      Patient Active Problem List   Diagnosis Code    Myelomeningocele ([address] Q05.9    Congenital hydrocephalus ([address] Q03.9    S/P  shunt Z98.2    Scoliosis M41.9    Complex partial seizures with consciousness impaired ([facility]) G40.209    Chronic headaches R51    Wound of right buttock S31.819A    Non-healing surgical wound T81.89XA    Decubitus ulcer of right buttock, stage 3 ([facility]) L89.313       ALLERGIES    Allergies   Allergen Reactions    Latex Swelling    Seasonal Itching    Ceclor [Cefaclor]     Other      banannas    Peanuts [Peanut Oil] Hives       Objective: There were no vitals taken for this visit.     Pre Debridement Measurements:  Wound 16 #4 right buttock (Active)   Wound Type Wound 2017  9:59 AM   Dressing Status Old drainage 2017  9:59 AM   Dressing Changed Changed/New 2017  9:59 AM   Wound

## 2018-01-11 ENCOUNTER — HOSPITAL ENCOUNTER (OUTPATIENT)
Dept: WOUND CARE | Age: 28
Discharge: HOME | End: 2018-01-11
Payer: COMMERCIAL

## 2018-01-11 VITALS
HEART RATE: 94 BPM | TEMPERATURE: 98 F | SYSTOLIC BLOOD PRESSURE: 151 MMHG | RESPIRATION RATE: 18 BRPM | WEIGHT: 160 LBS | HEIGHT: 60 IN | DIASTOLIC BLOOD PRESSURE: 63 MMHG | BODY MASS INDEX: 31.41 KG/M2

## 2018-01-11 PROCEDURE — 11042 DBRDMT SUBQ TIS 1ST 20SQCM/<: CPT

## 2018-01-11 ASSESSMENT — PAIN SCALES - GENERAL: PAINLEVEL_OUTOF10: 0

## 2018-01-11 NOTE — PROGRESS NOTES
Wound Care Progress Note    Nourm G Freyer  AGE: 32 y.o. GENDER: male  : 1990  TODAY'S DATE:  2018    Subjective:   CHIEF COMPLAINT:  Right buttock wound    HISTORY of PRESENT ILLNESS HPI   Judd Mead is a 32 y.o. male who presents today for wound evaluation.     Wound Type:pressure and non-healing surgical  Wound Location:buttocks  Modifying factors:chronic pressure      Diagnosis Date    Chronic headaches     Complex partial seizures with consciousness impaired (HCC)     Congenital hydrocephalus (HCC)     Dysplasia of hip     Esotropia, unspecified     corrective surgery     Myelom[address]ocele ([facility] [address]     L4 functional level    Neurogenic bladder     S/P  shunt     Scoliosis     Seizures (HCC)     Tethered cord ([facility] [address]     Release  and      Patient Active Problem List   Diagnosis Code    Myelomeningocele ([facility] [address] Q05.9    Congenital hydrocephalus (HCC) Q03.9    S/P  shunt Z98.2    Scoliosis M41.9    Complex partial seizures with consciousness impaired (HCC) G40.209    Chronic headaches R51    Wound of right buttock S31.819A    Surgical wound, non healing T81.89XA    Decubitus ulcer of right buttock, stage 3 (HCC) L89.313       ALLERGIES    Allergies   Allergen Reactions    Latex Swelling    Seasonal Itching    Ceclor [Cefaclor]     Other      banannas    Peanuts [Peanut Oil] Hives       Objective:      BP (!) 151/63   Pulse 94   Temp 98 °F (36.7 °C) (Tympanic)   Resp 18   Ht 5' (1.524 m)   Wt 160 lb (72.6 kg)   BMI 31.25 kg/m²     Pre Debridement Measurements:  Wound 10/10/16 #3 right outer buttock (Active)   Dressing Status Old drainage 10/31/2016 10:07 AM   Dressing Changed Changed/New 10/31/2016 10:07 AM   Wound Cleansed Rinsed/Irrigated with saline 10/31/2016 10:07 AM   Wound Length (cm) 0.2 cm 10/31/2016 10:25 AM   Wound Width (cm) 0.3 cm 10/31/2016 10:25 AM   Wound Depth (cm)  1.5 10/31/2016 10:25 AM   Calculated Wound Size (cm^2) (l*w) 0.06 cm^2 1:21 PM   Post procedural Pain 0 1/11/2018  1:21 PM   Number of days: 402        The patients pain is mild. Wound is good granulation and healing  Wound is has slightly improved. Assessment:     Procedure Note:Procedure Note: The wound is anesthetized with: lidocaine gel    Debridement: Excisional Debridement    Using curette the wound was sharply debrided    down through and including the removal of subcutaneous tissue. Devitalized Tissue Debrided:  fibrin, biofilm and slough. Bleeding: Minimal    Hemostasis:   by pressure    Response to treatment:  Well tolerated by patient. Post debridement wound description:  good granulation tissue, healing  Post debridement Wound Location:buttocks    32 y.o. male   Patient Active Problem List   Diagnosis    Myelomeningocele ([address]    Congenital hydrocephalus ([address]    S/P  shunt    Scoliosis    Complex partial seizures with consciousness impaired ([address]    Chronic headaches    Wound of right buttock    Surgical wound, non healing    Decubitus ulcer of right buttock, stage 3 ([address]       Plan:     Discharge Treatment  May Shower   Pack Wound:  wound vac at 200mmHg black sponge continuous     Multivitamin once daily    High Protein diet    Smoking cessation:   N/A    Discussed appropriate home care of this wound. Wound redressed. Patient instructions were given. Follow up: 1 week.            Electronically signed by Robert Israel MD on 1/11/2018 at 1:26 PM

## 2018-01-17 ENCOUNTER — HOSPITAL ENCOUNTER (OUTPATIENT)
Dept: WOUND CARE | Age: 28
Discharge: HOME | End: 2018-01-17
Payer: COMMERCIAL

## 2018-01-17 VITALS
RESPIRATION RATE: 18 BRPM | DIASTOLIC BLOOD PRESSURE: 77 MMHG | TEMPERATURE: 98.5 F | SYSTOLIC BLOOD PRESSURE: 134 MMHG | HEART RATE: 108 BPM

## 2018-01-17 PROCEDURE — 11042 DBRDMT SUBQ TIS 1ST 20SQCM/<: CPT

## 2018-01-17 NOTE — PROGRESS NOTES
Wound Care Progress Note    Nourm G Freyer  AGE: 32 y.o. GENDER: male  : 1990  TODAY'S DATE:  2018    Subjective:   CHIEF COMPLAINT:  Gluteal wound      HISTORY of PRESENT ILLNESS HPI   Nourm Jenice Cogan is a 32 y.o. male who presents today for wound evaluation.     Wound Type:pressure and non-healing surgical  Wound Location:buttocks  Modifying factors:chronic pressure, decreased mobility and malnutrition      Diagnosis Date    Chronic headaches     Complex partial seizures with consciousness impaired (HCC)     Congenital hydrocephalus (HCC)     Dysplasia of hip     Esotropia, unspecified     corrective surgery     Myelomeningocele ([facility] [address] [facility]     L4 functional level    Neurogenic bladder     S/P  shunt     Scoliosis     Seizures (HCC)     Tethered cord ([address]     Release  and      Patient Active Problem List   Diagnosis Code    Myelomeningocele ([facility] [address] [facility] Q05.9    Congenital hydrocephalus ([facility] [address] [facility] Q03.9    S/P  shunt Z98.2    Scoliosis M41.9    Complex partial seizures with consciousness impaired (HCC) G40.209    Chronic headaches R51    Wound of right buttock S31.819A    Surgical wound, non healing T81.89XA    Decubitus ulcer of right buttock, stage 3 (HCC) L89.313       ALLERGIES    Allergies   Allergen Reactions    Latex Swelling    Seasonal Itching    Ceclor [Cefaclor]     Other      banannas    Peanuts [Peanut Oil] Hives       Objective:      /77   Pulse 108   Temp 98.5 °F (36.9 °C) (Tympanic)   Resp 18     Pre Debridement Measurements:  Wound 10/10/16 #3 right outer buttock (Active)   Dressing Status Old drainage 10/31/2016 10:07 AM   Dressing Changed Changed/New 10/31/2016 10:07 AM   Wound Cleansed Rinsed/Irrigated with saline 10/31/2016 10:07 AM   Wound Length (cm) 0.2 cm 10/31/2016 10:25 AM   Wound Width (cm) 0.3 cm 10/31/2016 10:25 AM   Wound Depth (cm)  1.5 10/31/2016 10:25 AM   Calculated Wound Size (cm^2) (l*w) 0.06 cm^2 10/31/2016 10:25 AM   Change

## 2018-01-17 NOTE — PLAN OF CARE
Problem: Wound:  Goal: Will show signs of wound healing; wound closure and no evidence of infection  Will show signs of wound healing; wound closure and no evidence of infection   Outcome: Ongoing  Wound remains stable, will decrease VAC to 175mmhg    Problem: Falls - Risk of:  Goal: Will remain free from falls  Will remain free from falls   Outcome: Met This Shift  Safety maintained no falls this visit

## 2018-01-24 ENCOUNTER — HOSPITAL ENCOUNTER (OUTPATIENT)
Dept: WOUND CARE | Age: 28
Discharge: HOME | End: 2018-01-24
Payer: COMMERCIAL

## 2018-01-24 VITALS
DIASTOLIC BLOOD PRESSURE: 76 MMHG | TEMPERATURE: 97.9 F | SYSTOLIC BLOOD PRESSURE: 132 MMHG | RESPIRATION RATE: 18 BRPM | HEIGHT: 60 IN

## 2018-01-24 PROCEDURE — 6370000000 HC RX 637 (ALT 250 FOR IP): Performed by: SURGERY

## 2018-01-24 PROCEDURE — 11042 DBRDMT SUBQ TIS 1ST 20SQCM/<: CPT

## 2018-01-24 RX ADMIN — LIDOCAINE HYDROCHLORIDE: 20 JELLY TOPICAL at 10:37

## 2018-01-24 ASSESSMENT — PAIN SCALES - GENERAL: PAINLEVEL_OUTOF10: 0

## 2018-01-24 NOTE — PROGRESS NOTES
1/24/2018 11:26 AM   Procedural Pain 0 1/24/2018 11:26 AM   Post procedural Pain 0 1/24/2018 11:26 AM   Number of days: 415        The patients pain is mild. Wound is clean, open and good granulation  Wound is has improved. Assessment:     Procedure Note:Procedure Note: The wound is anesthetized with: lidocaine gel    Debridement: Excisional Debridement    Using curette the wound was sharply debrided    down through and including the removal of muscle/fascia. Devitalized Tissue Debrided:  fibrin, biofilm and slough. Bleeding: Minimal    Hemostasis:   by pressure    Response to treatment:  Well tolerated by patient. Post debridement wound description:  clean, good granulation tissue, healing  Post debridement Wound Location: buttocks    32 y.o. male   Patient Active Problem List   Diagnosis    Myelomeningocele ([address]    Congenital hydrocephalus ([address]    S/P  shunt    Scoliosis    Complex partial seizures with consciousness impaired ([address]    Chronic headaches    Wound of right buttock    Surgical wound, non healing    Decubitus ulcer of right buttock, stage 3 ([address]       Plan:     Discharge Treatment  Cleanse wound with mild soap and water   Wound Negative Pressure:  [employer] wound vac with black sponge at -175mmHg continuous     Multivitamin once daily    High Protein diet    Discussed appropriate home care of this wound. Wound redressed. Patient instructions were given. Follow up: 2 weeks.            Electronically signed by Ned Johnson DO on 1/24/2018 at 11:30 AM

## 2018-02-07 ENCOUNTER — HOSPITAL ENCOUNTER (OUTPATIENT)
Dept: WOUND CARE | Age: 28
Discharge: HOME | End: 2018-02-07
Payer: COMMERCIAL

## 2018-02-07 RX ORDER — LIDOCAINE HYDROCHLORIDE 40 MG/ML
SOLUTION TOPICAL ONCE
Status: DISCONTINUED | OUTPATIENT
Start: 2018-02-07 | End: 2018-02-10 | Stop reason: HOSPADM

## 2018-02-14 ENCOUNTER — HOSPITAL ENCOUNTER (OUTPATIENT)
Dept: WOUND CARE | Age: 28
Discharge: HOME | End: 2018-02-14
Payer: COMMERCIAL

## 2018-02-14 VITALS
HEART RATE: 119 BPM | BODY MASS INDEX: 31.25 KG/M2 | WEIGHT: 160 LBS | SYSTOLIC BLOOD PRESSURE: 132 MMHG | DIASTOLIC BLOOD PRESSURE: 80 MMHG | TEMPERATURE: 98.6 F | RESPIRATION RATE: 16 BRPM

## 2018-02-14 PROCEDURE — 6370000000 HC RX 637 (ALT 250 FOR IP): Performed by: SURGERY

## 2018-02-14 PROCEDURE — 97605 NEG PRS WND THER DME<=50SQCM: CPT

## 2018-02-14 PROCEDURE — 11043 DBRDMT MUSC&/FSCA 1ST 20/<: CPT

## 2018-02-14 RX ORDER — LIDOCAINE HYDROCHLORIDE 40 MG/ML
SOLUTION TOPICAL ONCE
Status: COMPLETED | OUTPATIENT
Start: 2018-02-14 | End: 2018-02-14

## 2018-02-14 RX ORDER — SODIUM HYPOCHLORITE 1.25 MG/ML
SOLUTION TOPICAL
Qty: 1 BOTTLE | Refills: 1 | Status: SHIPPED | OUTPATIENT
Start: 2018-02-14 | End: 2018-03-21

## 2018-02-14 RX ADMIN — LIDOCAINE HYDROCHLORIDE: 40 SOLUTION TOPICAL at 10:32

## 2018-02-14 ASSESSMENT — PAIN SCALES - GENERAL: PAINLEVEL_OUTOF10: 0

## 2018-02-14 NOTE — PROGRESS NOTES
distress  Skin: open wwound right gluteus      Assessment:      Patient Active Problem List   Diagnosis Code    Myelomeningocele ([facility] Q05.9    Congenital hydrocephalus ([facility] Q03.9    S/P  shunt Z98.2    Scoliosis M41.9    Complex partial seizures with consciousness impaired (HCC) G40.209    Chronic headaches R51    Wound of right buttock S31.819A    Surgical wound, non healing T81.89XA    Decubitus ulcer of right buttock, stage 3 ([facility] [address] L89.313        Procedure Note  Indications:  Based on my examination of this patient's wound(s)/ulcer(s) today, debridement is required to promote healing and evaluate the wound base. Performed by: Olivia Harley DO    Consent obtained:  Yes    Time out taken:  Yes    Pain Control: Anesthetic  Anesthetic: 4% Lidocaine Liquid Topical       Debridement:Excisional Debridement    Using curette the wound(s)/ulcer(s) was/were sharply debrided down through and including the removal of muscle/fascia. Devitalized Tissue Debrided:  fibrin, biofilm and slough    Pre Debridement Measurements:  Are located in the [place]  Documentation Flow Sheet    Wound/Ulcer #: 1    Post Debridement Measurements:  Wound/Ulcer Descriptions are Pre Debridement except measurements:    Wound 10/10/16 #3 right outer buttock (Active)   Dressing Status Old drainage 10/31/2016 10:07 AM   Dressing Changed Changed/New 10/31/2016 10:07 AM   Wound Cleansed Rinsed/Irrigated with saline 10/31/2016 10:07 AM   Wound Length (cm) 0.2 cm 10/31/2016 10:25 AM   Wound Width (cm) 0.3 cm 10/31/2016 10:25 AM   Wound Depth (cm)  1.5 10/31/2016 10:25 AM   Calculated Wound Size (cm^2) (l*w) 0.06 cm^2 10/31/2016 10:25 AM   Change in Wound Size % (l*w) 87.5 10/31/2016 10:25 AM   Drainage Amount Moderate 10/31/2016 10:07 AM   Drainage Description Serosanguinous 10/31/2016 10:07 AM   Odor None 10/31/2016 10:07 AM   Alayna-wound Assessment Clean;Dry; Intact 10/10/2016  9:43 AM   Red%Wound Bed 100 10/31/2016 10:07 AM Yellow%Wound Bed 90 10/10/2016  9:43 AM   Debridement per physician Subcutaneous 10/31/2016 10:25 AM   Time out Yes 10/31/2016 10:25 AM   Procedural Pain 0 10/31/2016 10:25 AM   Post procedural Pain 0 10/31/2016 10:25 AM   Number of days: 492       Wound 12/05/16 #4 right buttock (Active)   Wound Image   1/2/2018 11:00 AM   Wound Type Wound 2/14/2018 10:28 AM   Dressing Status Old drainage 2/14/2018 10:28 AM   Dressing Changed Changed/New 2/14/2018 10:28 AM   Wound Cleansed Rinsed/Irrigated with saline 2/14/2018 10:28 AM   Wound Length (cm) 0.5 cm 2/14/2018 11:04 AM   Wound Width (cm) 3.5 cm 2/14/2018 11:04 AM   Wound Depth (cm)  3.5 2/14/2018 11:04 AM   Calculated Wound Size (cm^2) (l*w) 1.75 cm^2 2/14/2018 11:04 AM   Change in Wound Size % (l*w) -993.75 2/14/2018 11:04 AM   Distance Tunneling (cm) 9 cm 7/11/2017 10:03 AM   Tunneling Position ___ O'Clock 500 7/11/2017 10:03 AM   Undermining Starts ___ O'Clock 5 12/12/2017  9:42 AM   Undermining Ends___ O'Clock 7 12/12/2017  9:42 AM   Undermining Maxium Distance (cm) [email or web address] 12/12/2017  9:42 AM   Wound Assessment [place];Red 2/14/2018 10:28 AM   Drainage Amount Moderate 2/14/2018 10:28 AM   Drainage Description Serosanguinous 2/14/2018 10:28 AM   Odor None 2/14/2018 10:28 AM   Margins Defined edges 2/14/2018 10:28 AM   Alayna-wound Assessment Maceration 2/14/2018 10:28 AM   [place]%Wound Bed 0 8/29/2017 10:36 AM   Red%Wound Bed 60 2/14/2018 10:28 AM   Yellow%Wound Bed 25 1/11/2018 12:59 PM   Black%Wound Bed 0 2/14/2018 10:28 AM   Other%Wound Bed 30 2/14/2018 10:28 AM   Culture Taken Yes 7/11/2017 10:03 AM   Debridement per physician Muscle 1/24/2018 11:26 AM   Time out Yes 1/24/2018 11:26 AM   Procedural Pain 0 1/24/2018 11:26 AM   Post procedural Pain 0 1/24/2018 11:26 AM   Number of days: 436       Percent of Wound(s)/Ulcer(s) Debrided: 100%    Total Surface Area Debrided:  1.75 sq cm       Diabetic/Pressure/Non Pressure Ulcers only:  Ulcer: N/A      Estimated Blood Loss:

## 2018-02-28 ENCOUNTER — HOSPITAL ENCOUNTER (OUTPATIENT)
Dept: WOUND CARE | Age: 28
Discharge: HOME | End: 2018-02-28
Payer: COMMERCIAL

## 2018-02-28 VITALS
DIASTOLIC BLOOD PRESSURE: 77 MMHG | TEMPERATURE: 97.7 F | RESPIRATION RATE: 16 BRPM | SYSTOLIC BLOOD PRESSURE: 130 MMHG | HEART RATE: 102 BPM

## 2018-02-28 PROCEDURE — 11043 DBRDMT MUSC&/FSCA 1ST 20/<: CPT

## 2018-02-28 PROCEDURE — 6370000000 HC RX 637 (ALT 250 FOR IP): Performed by: SURGERY

## 2018-02-28 PROCEDURE — 97605 NEG PRS WND THER DME<=50SQCM: CPT

## 2018-02-28 RX ORDER — LIDOCAINE HYDROCHLORIDE 40 MG/ML
SOLUTION TOPICAL ONCE
Status: COMPLETED | OUTPATIENT
Start: 2018-02-28 | End: 2018-02-28

## 2018-02-28 RX ADMIN — LIDOCAINE HYDROCHLORIDE: 40 SOLUTION TOPICAL at 09:53

## 2018-02-28 NOTE — PROGRESS NOTES
AM   Post procedural Pain 0 1/24/2018 11:26 AM   Number of days: 450       Percent of Wound(s)/Ulcer(s) Debrided: 100%    Total Surface Area Debrided:  1.75 sq cm       Diabetic/Pressure/Non Pressure Ulcers only:  Ulcer: Pressure ulcer, Stage 3      Estimated Blood Loss:  Minimal    Hemostasis Achieved:  by pressure    Procedural Pain:  0  / 10     Post Procedural Pain:  0 / 10     Response to treatment:  Well tolerated by patient. Plan:     Treatment Note please see attached Discharge Instructions    Written patient dismissal instructions given to patient and signed by patient or POA. Discharge Instructions                 [facility] and HYPERBARIC TREATMENT  CENTER                                                               Visit Alec Instructions / Physician Orders  2/28/18   Home Care: [facility]     SUPPLIES ORDERED THRU:     Wound Location:  Right buttock     Cleanse with: wash with 0.125% Dakins then apply wound vac     Dressing Orders: restart KCI wound vac black foam at 175 mmhg cont        Frequency: change Monday - Wednesday - Friday     Additional Orders: Increase protein to diet (meat, cheese, eggs, fish, peanut butter, nuts and beans)  Multivitamin daily   up for meals only 1/2 hr at a time     Your next appointment with [facility] is in 2 weeks  with      (Please note your next appointment above and if you are unable to keep, kindly give a 24 hour notice.  Thank you.)     If you experience any of the following, please call the [facility] during business hours:  [phone]     * Increase in Pain  * Temperature over 101  * Increase in drainage from your wound  * Drainage with a foul odor  * Bleeding  * Increase in swelling  * Need for compression bandage changes due to slippage, breakthrough drainage.     If you need medical attention outside of the business hours of the [facility] please contact your PCP or go to the nearest emergency room.     Patient Signature:__________________________________________________Date:_____________  Electronically signed by Killian Garcia RN on 2/28/2018 at 10:37 AM     Electronically signed by Poncho Rodriguez DO on 2/28/2018 at 10:43 AM          Electronically signed by Poncho Rodriguez DO on 2/28/2018 at 10:43 AM

## 2018-03-13 ENCOUNTER — OFFICE VISIT (OUTPATIENT)
Dept: FAMILY MEDICINE CLINIC | Age: 28
End: 2018-03-13
Payer: COMMERCIAL

## 2018-03-13 VITALS
WEIGHT: 155 LBS | RESPIRATION RATE: 20 BRPM | SYSTOLIC BLOOD PRESSURE: 128 MMHG | BODY MASS INDEX: 30.43 KG/M2 | DIASTOLIC BLOOD PRESSURE: 76 MMHG | OXYGEN SATURATION: 99 % | HEART RATE: 96 BPM | TEMPERATURE: 98.3 F | HEIGHT: 60 IN

## 2018-03-13 DIAGNOSIS — L89.313 DECUBITUS ULCER OF RIGHT BUTTOCK, STAGE 3 (HCC): Chronic | ICD-10-CM

## 2018-03-13 DIAGNOSIS — Q05.9 MYELOMENINGOCELE (HCC): Primary | Chronic | ICD-10-CM

## 2018-03-13 DIAGNOSIS — Q03.9 CONGENITAL HYDROCEPHALUS (HCC): Chronic | ICD-10-CM

## 2018-03-13 DIAGNOSIS — R09.82 POST-NASAL DRAINAGE: ICD-10-CM

## 2018-03-13 PROCEDURE — G8417 CALC BMI ABV UP PARAM F/U: HCPCS | Performed by: NURSE PRACTITIONER

## 2018-03-13 PROCEDURE — 99214 OFFICE O/P EST MOD 30 MIN: CPT | Performed by: NURSE PRACTITIONER

## 2018-03-13 PROCEDURE — G8427 DOCREV CUR MEDS BY ELIG CLIN: HCPCS | Performed by: NURSE PRACTITIONER

## 2018-03-13 PROCEDURE — G8484 FLU IMMUNIZE NO ADMIN: HCPCS | Performed by: NURSE PRACTITIONER

## 2018-03-13 PROCEDURE — 1036F TOBACCO NON-USER: CPT | Performed by: NURSE PRACTITIONER

## 2018-03-13 RX ORDER — LORATADINE 10 MG/1
10 TABLET ORAL DAILY
Qty: 30 TABLET | Refills: 11 | Status: SHIPPED | OUTPATIENT
Start: 2018-03-13

## 2018-03-13 RX ORDER — EPINEPHRINE 0.3 MG/.3ML
INJECTION SUBCUTANEOUS
Qty: 2 EACH | Refills: 1 | Status: SHIPPED | OUTPATIENT
Start: 2018-03-13

## 2018-03-13 ASSESSMENT — PATIENT HEALTH QUESTIONNAIRE - PHQ9
2. FEELING DOWN, DEPRESSED OR HOPELESS: 0
1. LITTLE INTEREST OR PLEASURE IN DOING THINGS: 0
SUM OF ALL RESPONSES TO PHQ9 QUESTIONS 1 & 2: 0
SUM OF ALL RESPONSES TO PHQ QUESTIONS 1-9: 0

## 2018-03-14 ENCOUNTER — HOSPITAL ENCOUNTER (OUTPATIENT)
Dept: WOUND CARE | Age: 28
Discharge: HOME | End: 2018-03-14
Payer: COMMERCIAL

## 2018-03-14 VITALS
HEIGHT: 60 IN | HEART RATE: 113 BPM | TEMPERATURE: 98.4 F | BODY MASS INDEX: 30.43 KG/M2 | DIASTOLIC BLOOD PRESSURE: 80 MMHG | WEIGHT: 155 LBS | SYSTOLIC BLOOD PRESSURE: 151 MMHG | RESPIRATION RATE: 18 BRPM

## 2018-03-14 PROCEDURE — 87205 SMEAR GRAM STAIN: CPT

## 2018-03-14 PROCEDURE — 87070 CULTURE OTHR SPECIMN AEROBIC: CPT

## 2018-03-14 PROCEDURE — 87075 CULTR BACTERIA EXCEPT BLOOD: CPT

## 2018-03-14 PROCEDURE — 6370000000 HC RX 637 (ALT 250 FOR IP): Performed by: SURGERY

## 2018-03-14 PROCEDURE — 11043 DBRDMT MUSC&/FSCA 1ST 20/<: CPT

## 2018-03-14 PROCEDURE — 87077 CULTURE AEROBIC IDENTIFY: CPT

## 2018-03-14 PROCEDURE — 87176 TISSUE HOMOGENIZATION CULTR: CPT

## 2018-03-14 RX ORDER — LIDOCAINE HYDROCHLORIDE 40 MG/ML
SOLUTION TOPICAL ONCE
Status: COMPLETED | OUTPATIENT
Start: 2018-03-14 | End: 2018-03-14

## 2018-03-14 RX ADMIN — LIDOCAINE HYDROCHLORIDE 5 ML: 40 SOLUTION TOPICAL at 10:59

## 2018-03-14 ASSESSMENT — PAIN SCALES - GENERAL: PAINLEVEL_OUTOF10: 0

## 2018-03-14 NOTE — PROGRESS NOTES
[address]   Progress Note and Procedure Note      Marbin Alonzo  MEDICAL RECORD NUMBER:  [id number]  AGE: 32 y.o. GENDER: male  : 1990  EPISODE DATE:  3/14/2018    Subjective:     Chief Complaint   Patient presents with    Wound Check     right buttock         HISTORY of PRESENT ILLNESS HPI     Judd Fan is a 32 y.o. male who presents today for wound/ulcer evaluation. History of Wound Context: right gluteal pressure wound  Wound/Ulcer Pain Timing/Severity: none  Quality of pain: N/A  Severity:  0 / 10   Modifying Factors: None  Associated Signs/Symptoms: none    Ulcer Identification:  Ulcer Type: pressure  Contributing Factors: chronic pressure and decreased mobility    Wound: N/A        PAST MEDICAL HISTORY        Diagnosis Date    Chronic headaches     Complex partial seizures with consciousness impaired (HCC)     Congenital hydrocephalus (HCC)     Dysplasia of hip     Esotropia, unspecified     corrective surgery     Myelomening[facility] ([facility] [address]     L4 functional level    Neurogenic bladder     S/P  shunt     Scoliosis     Seizures (HCC)     Tethered cord ([address]     Release  and        PAST SURGICAL HISTORY    Past Surgical History:   Procedure Laterality Date    CYST INCISION AND DRAINAGE  01/10/2017    unroofing of chronic sinus tract right buttocks.     CYSTOSCOPY  5/29/15    EYE SURGERY      OTHER SURGICAL HISTORY Right 01/10/2017    unroofing buttock wound right side     SKIN BIOPSY Right 10/2/2017    EXCISION DEBRIDEMENT BUTTOCK WOUND WITH APPLICATION OF WOUND VAC performed by Yissel Batista MD at [address]      T7-L4    VENTRICULOPERITONEAL SHUNT      last revised        FAMILY HISTORY    Family History   Problem Relation Age of Onset    Heart Disease Father     Heart Surgery Father     Hypertension Father     Heart Disease Paternal Grandfather     Heart Failure Paternal Grandfather     Osteoarthritis Mother     Liver Ht 5' (1.524 m)   Wt 155 lb (70.3 kg)   BMI 30.27 kg/m²     Wt Readings from Last 3 Encounters:   03/14/18 155 lb (70.3 kg)   03/13/18 155 lb (70.3 kg)   02/14/18 160 lb (72.6 kg)       PHYSICAL EXAM    General Appearance: alert and oriented to person, place and time, well-developed and well-nourished, in no acute distress  Skin: open wound right gluteus extending through muscle, slightly dusky appearence      Assessment:      Patient Active Problem List   Diagnosis Code    Myelomeningocele ([facility] [address] Q05.9    Congenital hydrocephalus ([facility] [address] Q03.9    S/P  shunt Z98.2    Scoliosis M41.9    Complex partial seizures with consciousness impaired ([address] G40.209    Chronic headaches R51    Wound of right buttock S31.819A    Surgical wound, non healing T81.89XA    Decubitus ulcer of right buttock, stage 3 ([address] L89.313        Procedure Note  Indications:  Based on my examination of this patient's wound(s)/ulcer(s) today, debridement is required to promote healing and evaluate the wound base. Performed by: Kulwant Augustin DO    Consent obtained:  Yes    Time out taken:  Yes    Pain Control: Anesthetic  Anesthetic: 4% Lidocaine Liquid Topical       Debridement:Excisional Debridement    Using curette the wound(s)/ulcer(s) was/were sharply debrided down through and including the removal of muscle/fascia.         Devitalized Tissue Debrided:  fibrin, biofilm and slough    Pre Debridement Measurements:  Are located in the [place]  Documentation Flow Sheet    Wound/Ulcer #: 3    Post Debridement Measurements:  Wound/Ulcer Descriptions are Pre Debridement except measurements:    Negative Pressure Wound Therapy Buttocks Right (Active)   Number of days: 163       Wound 10/10/16 #3 right outer buttock (Active)   Dressing Status Old drainage 10/31/2016 10:07 AM   Dressing Changed Changed/New 10/31/2016 10:07 AM   Wound Cleansed Rinsed/Irrigated with saline 10/31/2016 10:07 AM   Wound Length (cm) 0.2 cm 10/31/2016 10:25 Bed 0 2/28/2018  9:45 AM   Other%Wound Bed 0 2/28/2018  9:45 AM   Culture Taken Yes 7/11/2017 10:03 AM   Debridement per physician Muscle 1/24/2018 11:26 AM   Time out Yes 1/24/2018 11:26 AM   Procedural Pain 0 1/24/2018 11:26 AM   Post procedural Pain 0 1/24/2018 11:26 AM   Number of days: 463       Percent of Wound(s)/Ulcer(s) Debrided: 100%    Total Surface Area Debrided:  1.5 sq cm       Diabetic/Pressure/Non Pressure Ulcers only:  Ulcer: Pressure ulcer, Stage 4      Estimated Blood Loss:  Minimal    Hemostasis Achieved:  by pressure    Procedural Pain:  0  / 10     Post Procedural Pain:  0 / 10     Response to treatment:  Well tolerated by patient. Plan:     Treatment Note please see attached Discharge Instructions    Written patient dismissal instructions given to patient and signed by patient or POA. Will hold wound vac for 1 week vac holiday and pack wound with silvercel rope    Discharge Instructions                 [facility] and HYPERBARIC TREATMENT  CENTER                                                               Visit Rodríguez Christianson Instructions / Physician Orders  3/14/18   Home Care: [facility]     SUPPLIES ORDERED THRU:     Wound Location:  Right buttock     Cleanse with: wash with 0.125% Dakins then apply wound vac     Dressing Orders: Tuck silver wilton rope into wound, cover with ABD pad and mefix tape    HOLD KCI wound vac black foam at 175 mmhg cont        Frequency: daily     Additional Orders: Increase protein to diet (meat, cheese, eggs, fish, peanut butter, nuts and beans)  Multivitamin daily   up for meals only 1/2 hr at a time     Your next appointment with [address] [place] [address] [facility] is in 1 week with      (Please note your next appointment above and if you are unable to keep, kindly give a 24 hour notice.  Thank you.)     If you experience any of the following, please call the [facility] during business hours:  [phone]     * Increase in Pain  * Temperature over 101  * Increase in drainage from your wound  * Drainage with a foul odor  * Bleeding  * Increase in swelling  * Need for compression bandage changes due to slippage, breakthrough drainage.     If you need medical attention outside of the business hours of the [address] [place] [address] Road please contact your PCP or go to the nearest emergency room.     Patient Signature:__________________________________________________Date:_____________  Electronically signed by Brayan Mosqueda RN on 3/14/2018 at 11:05 AM     Electronically signed by Luiz Sexton DO on 3/14/2018 at 11:12 AM          Electronically signed by Luiz Sexton DO on 3/14/2018 at 11:13 AM

## 2018-03-21 ENCOUNTER — HOSPITAL ENCOUNTER (OUTPATIENT)
Dept: WOUND CARE | Age: 28
Discharge: HOME | End: 2018-03-21
Payer: COMMERCIAL

## 2018-03-21 VITALS
DIASTOLIC BLOOD PRESSURE: 98 MMHG | TEMPERATURE: 97.8 F | HEIGHT: 60 IN | RESPIRATION RATE: 18 BRPM | HEART RATE: 84 BPM | SYSTOLIC BLOOD PRESSURE: 120 MMHG | WEIGHT: 155 LBS | BODY MASS INDEX: 30.43 KG/M2

## 2018-03-21 PROCEDURE — 11042 DBRDMT SUBQ TIS 1ST 20SQCM/<: CPT

## 2018-03-21 ASSESSMENT — PAIN SCALES - GENERAL: PAINLEVEL_OUTOF10: 0

## 2018-03-21 NOTE — PROGRESS NOTES
9:45 AM   Culture Taken Yes 7/11/2017 10:03 AM   Debridement per physician Muscle 1/24/2018 11:26 AM   Time out Yes 1/24/2018 11:26 AM   Procedural Pain 0 1/24/2018 11:26 AM   Post procedural Pain 0 1/24/2018 11:26 AM   Number of days: 470       Percent of Wound(s)/Ulcer(s) Debrided: 100%    Total Surface Area Debrided:  1.75 sq cm       Diabetic/Pressure/Non Pressure Ulcers only:  Ulcer: Pressure ulcer, Stage 4      Estimated Blood Loss:  Minimal    Hemostasis Achieved:  by pressure    Procedural Pain:  0  / 10     Post Procedural Pain:  0 / 10     Response to treatment:  Well tolerated by patient. Plan:     Treatment Note please see attached Discharge Instructions    Written patient dismissal instructions given to patient and signed by patient or POA. Discharge Instructions                 [facility] and HYPERBARIC TREATMENT  CENTER                                                               Visit Jt Instructions / Physician Orders  3/21/18   Home Care: [facility]     SUPPLIES ORDERED THRU:     Wound Location:  Right buttock     Cleanse with: wash with 0.125% Dakins then apply wound vac     Dressing Orders: Tuck silver wilton rope into wound, cover with ABD pad and mefix tape     Continue to HOLD KCI wound vac black foam at 175 mmhg cont        Frequency: daily     Additional Orders: Increase protein to diet (meat, cheese, eggs, fish, peanut butter, nuts and beans)  Multivitamin daily   up for meals only 1/2 hr at a time     Your next appointment with [address] [place] [address] [facility] week with      (Please note your next appointment above and if you are unable to keep, kindly give a 24 hour notice.  Thank you.)     If you experience any of the following, please call the [address] [place] [address] [facility] during business hours:  [phone]     * Increase in Pain  * Temperature over 101  * Increase in drainage from your wound  * Drainage with a foul odor  * Bleeding  * Increase in swelling  * Need for

## 2018-03-25 ASSESSMENT — ENCOUNTER SYMPTOMS
CONSTIPATION: 0
BACK PAIN: 0
BLOOD IN STOOL: 0
COLOR CHANGE: 0
CHEST TIGHTNESS: 0
DIARRHEA: 0
SINUS PRESSURE: 0
COUGH: 0
SORE THROAT: 1
ABDOMINAL DISTENTION: 0
NAUSEA: 0
SHORTNESS OF BREATH: 0
ABDOMINAL PAIN: 0
WHEEZING: 0
EYE PAIN: 0
EYE ITCHING: 0

## 2018-03-25 NOTE — PROGRESS NOTES
[facility], [address]  [address] [address]  Dept: [phone]  Dept Fax: [phone]    Judd Neff is a 32 y.o. male who presents today for his medical conditions/complaints as noted below. Judd Neff is c/o of Medication Check and Flora Whaley received counseling on the following healthy behaviors: nutrition, exercise and medication adherence  Reviewed prior labs and health maintenance  Continue current medications, diet and exercise. Discussed use, benefit, and side effects of prescribed medications. Barriers to medication compliance addressed. Patient given educational materials - see patient instructions  Was a self-tracking handout given in paper form or via [employer]? Yes    Requested Prescriptions     Signed Prescriptions Disp Refills    EPINEPHrine (EPIPEN 2-ERIKA) 0.3 MG/0.3ML SOAJ injection 2 each 1     Sig: Inject as needed    Handicap Placard MISC 1 each 0     Sig: by Does not apply route Myelomeningocele ([facility] [address]  (primary encounter diagnosis)  Congenital hydrocephalus (HCC)  Multiple allergies    Duration:  5 years    loratadine (CLARITIN) 10 MG tablet 30 tablet 11     Sig: Take 1 tablet by mouth daily       All patient questions answered. Patient voiced understanding. Quality Measures    Body mass index is 30.27 kg/m². Elevated. Weight control planned discussed Healthy diet and regular exercise. BP: 128/76 Blood pressure is normal. Treatment plan consists of No treatment change needed. No results found for: LDLCALC, LDLCHOLESTEROL, LDLDIRECT (goal LDL reduction with dx if diabetes is 50% LDL reduction)      PHQ Scores 3/13/2018 7/26/2017   PHQ2 Score 0 0   PHQ9 Score 0 0     Interpretation of Total Score Depression Severity: 1-4 = Minimal depression, 5-9 = Mild depression, 10-14 = Moderate depression, 15-19 = Moderately severe depression, 20-27 = Severe depression        HPI:     HPI  Myelomeningocele:     This is a chronic problem that he  CYSTOSCOPY  5/29/15    EYE SURGERY      OTHER SURGICAL HISTORY Right 01/10/2017    unroofing buttock wound right side     SKIN BIOPSY Right 10/2/2017    EXCISION DEBRIDEMENT BUTTOCK WOUND WITH APPLICATION OF WOUND VAC performed by Phyllis Edouard MD at [address]      last revised 1999       Family History   Problem Relation Age of Onset    Heart Disease Father     Heart Surgery Father     Hypertension Father     Heart Disease Paternal Grandfather     Heart Failure Paternal Grandfather     Osteoarthritis Mother     Liver Disease Maternal Grandmother     Heart Attack Maternal Grandfather        Social History   Substance Use Topics    Smoking status: Never Smoker    Smokeless tobacco: Never Used    Alcohol use No      Current Outpatient Prescriptions   Medication Sig Dispense Refill    EPINEPHrine (EPIPEN 2-ERIKA) 0.3 MG/0.3ML SOAJ injection Inject as needed 2 each 1    Handicap Placard MISC by Does not apply route Myelomeningocele ([address]  (primary encounter diagnosis)  Congenital hydrocephalus (HCC)  Multiple allergies    Duration:  5 years 1 each 0    loratadine (CLARITIN) 10 MG tablet Take 1 tablet by mouth daily 30 tablet 11    sodium hypochlorite (DAKINS) 0.125 % SOLN external solution Cleanse wound with 0.125% dakins solution.  1 Bottle 1    Gauze Pads & Dressings (GAUZE DRESSING) 4\"X4\" PADS 1 each by Does not apply route daily 30 each 5    docusate sodium (COLACE) 100 MG capsule Take 1 capsule by mouth 2 times daily 30 capsule 2    Gauze Pads & Dressings (NU GAUZE PACKING STRIPS) MISC 1/4 inch plain packing gauze wet to dry to right buttock wound once daily cover with dry dressing 1 each 1    SODIUM CHLORIDE, EXTERNAL, ([employer] SALINE WOUND WASH) 0.9 % SOLN Apply 5 mLs topically daily 210 mL 1    Multiple Vitamins-Minerals (THERAPEUTIC MULTIVITAMIN-MINERALS) tablet Take 1 tablet by mouth daily      Riboflavin (VITAMIN B-2 PO) Take 1 tablet by mouth       No current facility-administered medications for this visit. Allergies   Allergen Reactions    Latex Swelling    Seasonal Itching    Ceclor [Cefaclor]     Other      banannas    Peanuts [Peanut Oil] Hives       Health Maintenance   Topic Date Due    HIV screen  11/16/2005    DTaP/Tdap/Td vaccine (1 - Tdap) 11/16/2009    Flu vaccine (1) 09/01/2017       Subjective:      Review of Systems   Constitutional: Negative for activity change and appetite change. HENT: Positive for postnasal drip and sore throat. Negative for congestion, ear pain, hearing loss, sinus pressure and tinnitus. Eyes: Positive for visual disturbance (legally blind). Negative for pain and itching. Respiratory: Negative for cough, chest tightness, shortness of breath and wheezing. Cardiovascular: Negative for chest pain, palpitations and leg swelling. Gastrointestinal: Negative for abdominal distention, abdominal pain, blood in stool, constipation, diarrhea and nausea. Endocrine: Negative for cold intolerance, heat intolerance, polydipsia, polyphagia and polyuria. Genitourinary: Positive for difficulty urinating (does self catheterizations). Negative for dysuria, flank pain, frequency and urgency. Musculoskeletal: Positive for gait problem. Negative for arthralgias, back pain and myalgias. Has spina bifida   Skin: Positive for wound (right gluteal). Negative for color change and rash. Allergic/Immunologic: Negative for environmental allergies and food allergies. Neurological: Positive for weakness (bilateral legs). Negative for speech difficulty, light-headedness and headaches. Hematological: Does not bruise/bleed easily. Psychiatric/Behavioral: Negative for decreased concentration and sleep disturbance. The patient is not nervous/anxious.         Objective:     /76 (Site: Left Arm, Position: Sitting, Cuff Size: Medium Adult)   Pulse 96   Temp 98.3 °F (36.8 °C) (Temporal) Drainage:  Antihistamines and decongestants should be taken at bedtime  Nasal sprays may offer relief. Some older antihistamines may dry and thicken post-nasal secretions even more  Decongestants can aggravate high blood pressure, heart, and thyroid disease. Steroid sprays may be used safely and rarely produce serious complications in short-term use, but significant side-effects can occur if steroids are used more than one week. Decubitus ulcer of right buttock:  Spend short amount of time in chair on buttocks  Followed by wound care clinic for treatment. Orders Placed This Encounter   Procedures    Wheelchair     Customized wheelchair fit to patient. Orders Placed This Encounter   Medications    EPINEPHrine (EPIPEN 2-ERIKA) 0.3 MG/0.3ML SOAJ injection     Sig: Inject as needed     Dispense:  2 each     Refill:  1    Handicap Placard MISC     Sig: by Does not apply route Myelomeningocele ([facility] [address]  (primary encounter diagnosis)  Congenital hydrocephalus (HCC)  Multiple allergies    Duration:  5 years     Dispense:  1 each     Refill:  0    loratadine (CLARITIN) 10 MG tablet     Sig: Take 1 tablet by mouth daily     Dispense:  30 tablet     Refill:  11        Return in about 1 year (around 3/13/2019). Patient given educational materials - see patient instructions. Discussed use, benefit, and side effects of prescribed medications. All patient questions answered. Pt voiced understanding. Reviewed health maintenance. Instructed to continue current medications, diet and exercise. Patient agreed with treatment plan. Follow up as directed.      Electronically signed by Baylee Sanchez CNP on 3/25/2018

## 2018-03-28 ENCOUNTER — HOSPITAL ENCOUNTER (OUTPATIENT)
Dept: WOUND CARE | Age: 28
Discharge: HOME | End: 2018-03-28
Payer: COMMERCIAL

## 2018-03-28 VITALS
RESPIRATION RATE: 16 BRPM | WEIGHT: 155 LBS | HEIGHT: 60 IN | BODY MASS INDEX: 30.43 KG/M2 | HEART RATE: 102 BPM | DIASTOLIC BLOOD PRESSURE: 76 MMHG | SYSTOLIC BLOOD PRESSURE: 137 MMHG | TEMPERATURE: 97.8 F

## 2018-03-28 PROCEDURE — 11043 DBRDMT MUSC&/FSCA 1ST 20/<: CPT

## 2018-03-28 PROCEDURE — 97605 NEG PRS WND THER DME<=50SQCM: CPT

## 2018-03-28 PROCEDURE — 6370000000 HC RX 637 (ALT 250 FOR IP): Performed by: SURGERY

## 2018-03-28 RX ORDER — LIDOCAINE HYDROCHLORIDE 40 MG/ML
SOLUTION TOPICAL ONCE
Status: COMPLETED | OUTPATIENT
Start: 2018-03-28 | End: 2018-03-28

## 2018-03-28 RX ADMIN — LIDOCAINE HYDROCHLORIDE 0.5 ML: 40 SOLUTION TOPICAL at 09:41

## 2018-03-28 ASSESSMENT — PAIN SCALES - GENERAL: PAINLEVEL_OUTOF10: 0

## 2018-03-28 NOTE — PROGRESS NOTES
10:03 AM   Debridement per physician Muscle 1/24/2018 11:26 AM   Time out Yes 1/24/2018 11:26 AM   Procedural Pain 0 1/24/2018 11:26 AM   Post procedural Pain 0 1/24/2018 11:26 AM   Number of days: 477       Percent of Wound(s)/Ulcer(s) Debrided: 100%    Total Surface Area Debrided:  1.25 sq cm       Diabetic/Pressure/Non Pressure Ulcers only:  Ulcer: Pressure ulcer, Stage 4      Estimated Blood Loss:  Minimal    Hemostasis Achieved:  by pressure    Procedural Pain:  0  / 10     Post Procedural Pain:  0 / 10     Response to treatment:  Well tolerated by patient. Plan:     Treatment Note please see attached Discharge Instructions    Written patient dismissal instructions given to patient and signed by patient or POA. Discharge Instructions                 Located within [facility] and HYPERBARIC TREATMENT  CENTER                                                               Visit Stephanie Kendrick Instructions / Physician Orders  3/28/18   Home Care: [facility]     SUPPLIES ORDERED THRU:     Wound Location:  Right buttock     Cleanse with: wash with 0.125% Dakins then apply wound vac     Dressing Orders: KCI wound vac, apply barrier prep and drape to nichole wound, apply black foam at 125 mmhg cont     Frequency: daily     Additional Orders: Increase protein to diet (meat, cheese, eggs, fish, peanut butter, nuts and beans)  Multivitamin daily  up for meals only 1/2 hr at a time     Your next appointment with [address] [place] [address] is in 1 week with      (Please note your next appointment above and if you are unable to keep, kindly give a 24 hour notice.  Thank you.)     If you experience any of the following, please call the [address] [place] [address] during business hours:  [phone]     * Increase in Pain  * Temperature over 101  * Increase in drainage from your wound  * Drainage with a foul odor  * Bleeding  * Increase in swelling  * Need for compression bandage changes due to slippage, breakthrough drainage.     If you need medical attention outside of the business hours of the [address] [place] [address] Road please contact your PCP or go to the nearest emergency room.     Patient Signature:__________________________________________________Date:_____________  Electronically signed by Amelia Whaley RN on 3/28/2018 at 10:00 AM     Electronically signed by Laura Perkins DO on 3/28/2018 at 10:02 AM          Electronically signed by Laura Perkins DO on 3/28/2018 at 10:02 AM

## 2018-04-04 ENCOUNTER — HOSPITAL ENCOUNTER (OUTPATIENT)
Dept: WOUND CARE | Age: 28
Discharge: HOME | End: 2018-04-04
Payer: COMMERCIAL

## 2018-04-04 VITALS
HEART RATE: 115 BPM | SYSTOLIC BLOOD PRESSURE: 125 MMHG | RESPIRATION RATE: 16 BRPM | HEIGHT: 60 IN | WEIGHT: 155 LBS | BODY MASS INDEX: 30.43 KG/M2 | TEMPERATURE: 97.9 F | DIASTOLIC BLOOD PRESSURE: 57 MMHG

## 2018-04-04 PROCEDURE — 97605 NEG PRS WND THER DME<=50SQCM: CPT

## 2018-04-04 PROCEDURE — 11043 DBRDMT MUSC&/FSCA 1ST 20/<: CPT

## 2018-04-04 RX ORDER — LIDOCAINE HYDROCHLORIDE 40 MG/ML
SOLUTION TOPICAL ONCE
Status: DISCONTINUED | OUTPATIENT
Start: 2018-04-04 | End: 2018-04-05 | Stop reason: HOSPADM

## 2018-04-04 ASSESSMENT — PAIN SCALES - GENERAL: PAINLEVEL_OUTOF10: 0

## 2018-04-18 ENCOUNTER — HOSPITAL ENCOUNTER (OUTPATIENT)
Dept: WOUND CARE | Age: 28
Discharge: HOME | End: 2018-04-18
Payer: COMMERCIAL

## 2018-04-18 VITALS
SYSTOLIC BLOOD PRESSURE: 122 MMHG | HEART RATE: 102 BPM | TEMPERATURE: 97 F | BODY MASS INDEX: 30.43 KG/M2 | HEIGHT: 60 IN | DIASTOLIC BLOOD PRESSURE: 68 MMHG | WEIGHT: 155 LBS | RESPIRATION RATE: 16 BRPM

## 2018-04-18 PROCEDURE — 11043 DBRDMT MUSC&/FSCA 1ST 20/<: CPT

## 2018-04-18 PROCEDURE — 97605 NEG PRS WND THER DME<=50SQCM: CPT

## 2018-04-18 ASSESSMENT — PAIN SCALES - GENERAL: PAINLEVEL_OUTOF10: 0

## 2018-05-02 ENCOUNTER — HOSPITAL ENCOUNTER (OUTPATIENT)
Dept: WOUND CARE | Age: 28
Discharge: HOME | End: 2018-05-02
Payer: COMMERCIAL

## 2018-05-02 VITALS
WEIGHT: 155 LBS | BODY MASS INDEX: 30.43 KG/M2 | HEART RATE: 102 BPM | HEIGHT: 60 IN | SYSTOLIC BLOOD PRESSURE: 134 MMHG | DIASTOLIC BLOOD PRESSURE: 73 MMHG | RESPIRATION RATE: 18 BRPM | TEMPERATURE: 98.3 F

## 2018-05-02 PROCEDURE — 11042 DBRDMT SUBQ TIS 1ST 20SQCM/<: CPT

## 2018-05-02 PROCEDURE — 6370000000 HC RX 637 (ALT 250 FOR IP): Performed by: SURGERY

## 2018-05-02 RX ORDER — LIDOCAINE HYDROCHLORIDE 40 MG/ML
SOLUTION TOPICAL ONCE
Status: COMPLETED | OUTPATIENT
Start: 2018-05-02 | End: 2018-05-02

## 2018-05-02 RX ADMIN — LIDOCAINE HYDROCHLORIDE 5 ML: 40 SOLUTION TOPICAL at 09:55

## 2018-05-02 ASSESSMENT — PAIN SCALES - GENERAL: PAINLEVEL_OUTOF10: 0

## 2018-05-18 ENCOUNTER — HOSPITAL ENCOUNTER (OUTPATIENT)
Dept: WOUND CARE | Age: 28
Discharge: HOME | End: 2018-05-18
Payer: COMMERCIAL

## 2018-05-18 VITALS
DIASTOLIC BLOOD PRESSURE: 66 MMHG | WEIGHT: 155 LBS | HEART RATE: 90 BPM | TEMPERATURE: 98.4 F | SYSTOLIC BLOOD PRESSURE: 125 MMHG | HEIGHT: 60 IN | RESPIRATION RATE: 18 BRPM | BODY MASS INDEX: 30.43 KG/M2

## 2018-05-18 PROCEDURE — 11046 DBRDMT MUSC&/FSCA EA ADDL: CPT

## 2018-05-18 PROCEDURE — 6370000000 HC RX 637 (ALT 250 FOR IP): Performed by: SURGERY

## 2018-05-18 PROCEDURE — 11042 DBRDMT SUBQ TIS 1ST 20SQCM/<: CPT

## 2018-05-18 RX ORDER — LIDOCAINE HYDROCHLORIDE 40 MG/ML
SOLUTION TOPICAL ONCE
Status: COMPLETED | OUTPATIENT
Start: 2018-05-18 | End: 2018-05-18

## 2018-05-18 RX ADMIN — LIDOCAINE HYDROCHLORIDE 5 ML: 40 SOLUTION TOPICAL at 14:02

## 2018-05-18 ASSESSMENT — PAIN SCALES - GENERAL: PAINLEVEL_OUTOF10: 0

## 2018-05-30 ENCOUNTER — HOSPITAL ENCOUNTER (OUTPATIENT)
Dept: WOUND CARE | Age: 28
Discharge: HOME | End: 2018-05-30
Payer: COMMERCIAL

## 2018-05-30 VITALS
TEMPERATURE: 97.4 F | RESPIRATION RATE: 20 BRPM | DIASTOLIC BLOOD PRESSURE: 55 MMHG | HEART RATE: 88 BPM | SYSTOLIC BLOOD PRESSURE: 133 MMHG

## 2018-05-30 PROCEDURE — 15271 SKIN SUB GRAFT TRNK/ARM/LEG: CPT

## 2018-05-30 PROCEDURE — 6370000000 HC RX 637 (ALT 250 FOR IP): Performed by: SURGERY

## 2018-05-30 RX ORDER — LIDOCAINE HYDROCHLORIDE 40 MG/ML
SOLUTION TOPICAL ONCE
Status: COMPLETED | OUTPATIENT
Start: 2018-05-30 | End: 2018-05-30

## 2018-05-30 RX ADMIN — LIDOCAINE HYDROCHLORIDE 10 ML: 40 SOLUTION TOPICAL at 10:14

## 2018-05-30 ASSESSMENT — PAIN SCALES - GENERAL: PAINLEVEL_OUTOF10: 0

## 2018-05-31 ENCOUNTER — TELEPHONE (OUTPATIENT)
Dept: FAMILY MEDICINE CLINIC | Age: 28
End: 2018-05-31

## 2018-06-06 ENCOUNTER — HOSPITAL ENCOUNTER (OUTPATIENT)
Dept: WOUND CARE | Age: 28
Discharge: HOME | End: 2018-06-06
Payer: COMMERCIAL

## 2018-06-06 VITALS
BODY MASS INDEX: 30.43 KG/M2 | RESPIRATION RATE: 16 BRPM | HEART RATE: 90 BPM | WEIGHT: 155 LBS | TEMPERATURE: 98 F | SYSTOLIC BLOOD PRESSURE: 131 MMHG | DIASTOLIC BLOOD PRESSURE: 70 MMHG | HEIGHT: 60 IN

## 2018-06-06 PROCEDURE — 15271 SKIN SUB GRAFT TRNK/ARM/LEG: CPT

## 2018-06-06 PROCEDURE — 6370000000 HC RX 637 (ALT 250 FOR IP): Performed by: SURGERY

## 2018-06-06 RX ORDER — LIDOCAINE HYDROCHLORIDE 40 MG/ML
SOLUTION TOPICAL ONCE
Status: COMPLETED | OUTPATIENT
Start: 2018-06-06 | End: 2018-06-06

## 2018-06-06 RX ADMIN — LIDOCAINE HYDROCHLORIDE 5 ML: 40 SOLUTION TOPICAL at 08:41

## 2018-06-06 ASSESSMENT — PAIN SCALES - GENERAL: PAINLEVEL_OUTOF10: 0

## 2018-06-13 ENCOUNTER — HOSPITAL ENCOUNTER (OUTPATIENT)
Dept: WOUND CARE | Age: 28
Discharge: HOME | End: 2018-06-13
Payer: COMMERCIAL

## 2018-06-13 VITALS
TEMPERATURE: 97.2 F | WEIGHT: 155 LBS | RESPIRATION RATE: 16 BRPM | SYSTOLIC BLOOD PRESSURE: 112 MMHG | HEIGHT: 60 IN | BODY MASS INDEX: 30.43 KG/M2 | DIASTOLIC BLOOD PRESSURE: 66 MMHG | HEART RATE: 96 BPM

## 2018-06-13 PROCEDURE — 15271 SKIN SUB GRAFT TRNK/ARM/LEG: CPT

## 2018-06-13 PROCEDURE — 6370000000 HC RX 637 (ALT 250 FOR IP): Performed by: SURGERY

## 2018-06-13 RX ORDER — LIDOCAINE HYDROCHLORIDE 40 MG/ML
SOLUTION TOPICAL ONCE
Status: COMPLETED | OUTPATIENT
Start: 2018-06-13 | End: 2018-06-13

## 2018-06-13 RX ADMIN — LIDOCAINE HYDROCHLORIDE 5 ML: 40 SOLUTION TOPICAL at 09:10

## 2018-06-13 ASSESSMENT — PAIN SCALES - GENERAL: PAINLEVEL_OUTOF10: 0

## 2018-06-20 ENCOUNTER — HOSPITAL ENCOUNTER (OUTPATIENT)
Dept: WOUND CARE | Age: 28
Discharge: HOME | End: 2018-06-20
Payer: COMMERCIAL

## 2018-06-20 VITALS
WEIGHT: 155 LBS | BODY MASS INDEX: 30.43 KG/M2 | HEART RATE: 89 BPM | RESPIRATION RATE: 16 BRPM | SYSTOLIC BLOOD PRESSURE: 117 MMHG | TEMPERATURE: 98 F | HEIGHT: 60 IN | DIASTOLIC BLOOD PRESSURE: 61 MMHG

## 2018-06-20 PROCEDURE — 11043 DBRDMT MUSC&/FSCA 1ST 20/<: CPT

## 2018-06-20 PROCEDURE — 6370000000 HC RX 637 (ALT 250 FOR IP): Performed by: SURGERY

## 2018-06-20 RX ORDER — LIDOCAINE HYDROCHLORIDE 40 MG/ML
SOLUTION TOPICAL ONCE
Status: COMPLETED | OUTPATIENT
Start: 2018-06-20 | End: 2018-06-20

## 2018-06-20 RX ADMIN — LIDOCAINE HYDROCHLORIDE 5 ML: 40 SOLUTION TOPICAL at 10:09

## 2018-06-20 ASSESSMENT — PAIN SCALES - GENERAL: PAINLEVEL_OUTOF10: 0

## 2018-06-25 ENCOUNTER — TELEPHONE (OUTPATIENT)
Dept: FAMILY MEDICINE CLINIC | Age: 28
End: 2018-06-25

## 2018-06-26 ENCOUNTER — TELEPHONE (OUTPATIENT)
Dept: FAMILY MEDICINE CLINIC | Age: 28
End: 2018-06-26

## 2018-06-27 RX ORDER — LIDOCAINE HYDROCHLORIDE 40 MG/ML
SOLUTION TOPICAL ONCE
Status: DISCONTINUED | OUTPATIENT
Start: 2018-06-27 | End: 2018-07-02 | Stop reason: HOSPADM

## 2018-06-29 ENCOUNTER — APPOINTMENT (OUTPATIENT)
Dept: GENERAL RADIOLOGY | Age: 28
DRG: 351 | End: 2018-06-29
Payer: COMMERCIAL

## 2018-06-29 ENCOUNTER — HOSPITAL ENCOUNTER (INPATIENT)
Age: 28
LOS: 3 days | Discharge: HOME HEALTH | DRG: 351 | End: 2018-07-03
Attending: EMERGENCY MEDICINE | Admitting: INTERNAL MEDICINE
Payer: COMMERCIAL

## 2018-06-29 ENCOUNTER — HOSPITAL ENCOUNTER (OUTPATIENT)
Dept: WOUND CARE | Age: 28
Discharge: HOME | End: 2018-06-29
Payer: COMMERCIAL

## 2018-06-29 DIAGNOSIS — L03.116 CELLULITIS OF LEFT LOWER EXTREMITY: Primary | ICD-10-CM

## 2018-06-29 LAB
ABSOLUTE EOS #: 0.1 K/UL (ref 0–0.4)
ABSOLUTE IMMATURE GRANULOCYTE: ABNORMAL K/UL (ref 0–0.3)
ABSOLUTE LYMPH #: 1.3 K/UL (ref 1–4.8)
ABSOLUTE MONO #: 1.1 K/UL (ref 0.1–1.3)
BASOPHILS # BLD: 1 % (ref 0–2)
BASOPHILS ABSOLUTE: 0.1 K/UL (ref 0–0.2)
DIFFERENTIAL TYPE: ABNORMAL
EOSINOPHILS RELATIVE PERCENT: 0 % (ref 0–4)
HCT VFR BLD CALC: 38.6 % (ref 41–53)
HEMOGLOBIN: 12.7 G/DL (ref 13.5–17.5)
IMMATURE GRANULOCYTES: ABNORMAL %
LYMPHOCYTES # BLD: 9 % (ref 24–44)
MCH RBC QN AUTO: 27.8 PG (ref 26–34)
MCHC RBC AUTO-ENTMCNC: 33 G/DL (ref 31–37)
MCV RBC AUTO: 84.3 FL (ref 80–100)
MONOCYTES # BLD: 8 % (ref 1–7)
NRBC AUTOMATED: ABNORMAL PER 100 WBC
PDW BLD-RTO: 14.2 % (ref 11.5–14.9)
PLATELET # BLD: 230 K/UL (ref 150–450)
PLATELET ESTIMATE: ABNORMAL
PMV BLD AUTO: 8.8 FL (ref 6–12)
RBC # BLD: 4.58 M/UL (ref 4.5–5.9)
RBC # BLD: ABNORMAL 10*6/UL
SEG NEUTROPHILS: 82 % (ref 36–66)
SEGMENTED NEUTROPHILS ABSOLUTE COUNT: 11.6 K/UL (ref 1.3–9.1)
WBC # BLD: 14.1 K/UL (ref 3.5–11)
WBC # BLD: ABNORMAL 10*3/UL

## 2018-06-29 PROCEDURE — 86140 C-REACTIVE PROTEIN: CPT

## 2018-06-29 PROCEDURE — 87205 SMEAR GRAM STAIN: CPT

## 2018-06-29 PROCEDURE — 36415 COLL VENOUS BLD VENIPUNCTURE: CPT

## 2018-06-29 PROCEDURE — 96365 THER/PROPH/DIAG IV INF INIT: CPT

## 2018-06-29 PROCEDURE — 85651 RBC SED RATE NONAUTOMATED: CPT

## 2018-06-29 PROCEDURE — 2580000003 HC RX 258: Performed by: EMERGENCY MEDICINE

## 2018-06-29 PROCEDURE — 87040 BLOOD CULTURE FOR BACTERIA: CPT

## 2018-06-29 PROCEDURE — 83605 ASSAY OF LACTIC ACID: CPT

## 2018-06-29 PROCEDURE — 80053 COMPREHEN METABOLIC PANEL: CPT

## 2018-06-29 PROCEDURE — 96368 THER/DIAG CONCURRENT INF: CPT

## 2018-06-29 PROCEDURE — 85025 COMPLETE CBC W/AUTO DIFF WBC: CPT

## 2018-06-29 PROCEDURE — 73562 X-RAY EXAM OF KNEE 3: CPT

## 2018-06-29 PROCEDURE — 99284 EMERGENCY DEPT VISIT MOD MDM: CPT

## 2018-06-29 RX ORDER — 0.9 % SODIUM CHLORIDE 0.9 %
1000 INTRAVENOUS SOLUTION INTRAVENOUS ONCE
Status: COMPLETED | OUTPATIENT
Start: 2018-06-30 | End: 2018-06-30

## 2018-06-29 RX ADMIN — SODIUM CHLORIDE 1000 ML: 9 INJECTION, SOLUTION INTRAVENOUS at 23:44

## 2018-06-29 ASSESSMENT — ENCOUNTER SYMPTOMS
BACK PAIN: 0
RESPIRATORY NEGATIVE: 1
ABDOMINAL PAIN: 0
COUGH: 0
EYES NEGATIVE: 1
SHORTNESS OF BREATH: 0
GASTROINTESTINAL NEGATIVE: 1

## 2018-06-30 PROBLEM — L03.90 CELLULITIS: Status: ACTIVE | Noted: 2018-06-30

## 2018-06-30 LAB
ALBUMIN SERPL-MCNC: 4.2 G/DL (ref 3.5–5.2)
ALBUMIN/GLOBULIN RATIO: ABNORMAL (ref 1–2.5)
ALP BLD-CCNC: 101 U/L (ref 40–129)
ALT SERPL-CCNC: 34 U/L (ref 5–41)
ANION GAP SERPL CALCULATED.3IONS-SCNC: 12 MMOL/L (ref 9–17)
AST SERPL-CCNC: 18 U/L
BILIRUB SERPL-MCNC: 0.72 MG/DL (ref 0.3–1.2)
BUN BLDV-MCNC: 15 MG/DL (ref 6–20)
BUN/CREAT BLD: ABNORMAL (ref 9–20)
C-REACTIVE PROTEIN: 201 MG/L (ref 0–5)
CALCIUM SERPL-MCNC: 9.1 MG/DL (ref 8.6–10.4)
CHLORIDE BLD-SCNC: 98 MMOL/L (ref 98–107)
CO2: 24 MMOL/L (ref 20–31)
CREAT SERPL-MCNC: 0.82 MG/DL (ref 0.7–1.2)
GFR AFRICAN AMERICAN: >60 ML/MIN
GFR NON-AFRICAN AMERICAN: >60 ML/MIN
GFR SERPL CREATININE-BSD FRML MDRD: ABNORMAL ML/MIN/{1.73_M2}
GFR SERPL CREATININE-BSD FRML MDRD: ABNORMAL ML/MIN/{1.73_M2}
GLUCOSE BLD-MCNC: 105 MG/DL (ref 70–99)
LACTIC ACID: 1.3 MMOL/L (ref 0.5–2.2)
POTASSIUM SERPL-SCNC: 3.8 MMOL/L (ref 3.7–5.3)
SEDIMENTATION RATE, ERYTHROCYTE: 5 MM (ref 0–15)
SODIUM BLD-SCNC: 134 MMOL/L (ref 135–144)
TOTAL PROTEIN: 7.7 G/DL (ref 6.4–8.3)
URIC ACID: 3.4 MG/DL (ref 3.4–7)

## 2018-06-30 PROCEDURE — 84550 ASSAY OF BLOOD/URIC ACID: CPT

## 2018-06-30 PROCEDURE — 2580000003 HC RX 258: Performed by: EMERGENCY MEDICINE

## 2018-06-30 PROCEDURE — 36415 COLL VENOUS BLD VENIPUNCTURE: CPT

## 2018-06-30 PROCEDURE — 99223 1ST HOSP IP/OBS HIGH 75: CPT | Performed by: INTERNAL MEDICINE

## 2018-06-30 PROCEDURE — 6360000002 HC RX W HCPCS: Performed by: INTERNAL MEDICINE

## 2018-06-30 PROCEDURE — 1200000000 HC SEMI PRIVATE

## 2018-06-30 PROCEDURE — 6360000002 HC RX W HCPCS: Performed by: EMERGENCY MEDICINE

## 2018-06-30 PROCEDURE — 2580000003 HC RX 258: Performed by: INTERNAL MEDICINE

## 2018-06-30 PROCEDURE — 6370000000 HC RX 637 (ALT 250 FOR IP): Performed by: INTERNAL MEDICINE

## 2018-06-30 RX ORDER — SODIUM CHLORIDE 0.9 % (FLUSH) 0.9 %
10 SYRINGE (ML) INJECTION PRN
Status: DISCONTINUED | OUTPATIENT
Start: 2018-06-30 | End: 2018-07-03 | Stop reason: HOSPADM

## 2018-06-30 RX ORDER — ONDANSETRON 4 MG/1
4 TABLET, ORALLY DISINTEGRATING ORAL EVERY 8 HOURS PRN
Status: DISCONTINUED | OUTPATIENT
Start: 2018-06-30 | End: 2018-07-03 | Stop reason: HOSPADM

## 2018-06-30 RX ORDER — SODIUM CHLORIDE 0.9 % (FLUSH) 0.9 %
10 SYRINGE (ML) INJECTION EVERY 12 HOURS SCHEDULED
Status: DISCONTINUED | OUTPATIENT
Start: 2018-06-30 | End: 2018-07-03 | Stop reason: HOSPADM

## 2018-06-30 RX ORDER — SODIUM CHLORIDE 9 MG/ML
INJECTION, SOLUTION INTRAVENOUS CONTINUOUS
Status: DISCONTINUED | OUTPATIENT
Start: 2018-06-30 | End: 2018-07-03 | Stop reason: HOSPADM

## 2018-06-30 RX ORDER — DOCUSATE SODIUM 100 MG/1
100 CAPSULE, LIQUID FILLED ORAL 2 TIMES DAILY
Status: DISCONTINUED | OUTPATIENT
Start: 2018-06-30 | End: 2018-07-03 | Stop reason: HOSPADM

## 2018-06-30 RX ORDER — ACETAMINOPHEN 325 MG/1
650 TABLET ORAL EVERY 4 HOURS PRN
Status: DISCONTINUED | OUTPATIENT
Start: 2018-06-30 | End: 2018-07-03 | Stop reason: HOSPADM

## 2018-06-30 RX ADMIN — PIPERACILLIN SODIUM AND TAZOBACTAM SODIUM 3.38 G: 3; .375 INJECTION, POWDER, LYOPHILIZED, FOR SOLUTION INTRAVENOUS at 23:47

## 2018-06-30 RX ADMIN — DOCUSATE SODIUM 100 MG: 100 CAPSULE, LIQUID FILLED ORAL at 21:38

## 2018-06-30 RX ADMIN — METOPROLOL TARTRATE 25 MG: 25 TABLET ORAL at 21:38

## 2018-06-30 RX ADMIN — VANCOMYCIN HYDROCHLORIDE 1000 MG: 1 INJECTION, POWDER, LYOPHILIZED, FOR SOLUTION INTRAVENOUS at 00:42

## 2018-06-30 RX ADMIN — ENOXAPARIN SODIUM 40 MG: 40 INJECTION SUBCUTANEOUS at 07:54

## 2018-06-30 RX ADMIN — PIPERACILLIN SODIUM AND TAZOBACTAM SODIUM 3.38 G: 3; .375 INJECTION, POWDER, LYOPHILIZED, FOR SOLUTION INTRAVENOUS at 00:01

## 2018-06-30 RX ADMIN — Medication 10 ML: at 07:58

## 2018-06-30 RX ADMIN — VANCOMYCIN HYDROCHLORIDE 1000 MG: 1 INJECTION, POWDER, LYOPHILIZED, FOR SOLUTION INTRAVENOUS at 12:21

## 2018-06-30 RX ADMIN — SODIUM CHLORIDE: 9 INJECTION, SOLUTION INTRAVENOUS at 02:38

## 2018-06-30 RX ADMIN — SODIUM CHLORIDE: 9 INJECTION, SOLUTION INTRAVENOUS at 16:41

## 2018-06-30 RX ADMIN — PIPERACILLIN SODIUM AND TAZOBACTAM SODIUM 3.38 G: 3; .375 INJECTION, POWDER, LYOPHILIZED, FOR SOLUTION INTRAVENOUS at 16:40

## 2018-06-30 RX ADMIN — ACETAMINOPHEN 650 MG: 325 TABLET, FILM COATED ORAL at 23:56

## 2018-06-30 NOTE — CARE COORDINATION
CASE MANAGEMENT NOTE:    Admission Date:  6/29/2018 Nourm Druscilla Mcardle is a 32 y.o.  male    Admitted for : Cellulitis [L03.90]    Met with:  Patient    PCP:  Jose Perez                                Insurance:  [facility]      Current Residence/ Living Arrangements:  Pt lives at home with his parents. Has spina bifida. Current Services PTA:  Follows at [facility]. (has used [facility] in the past (not current with them now), and would like them again if needed. Is patient agreeable to [facility]: possibly. Unsure if he will need them at this time. Freedom of choice provided: Yes         [facility] chosen:  Yes- [facility]    DME:  walker and wheelchair    Home Oxygen: No    Nebulizer: No    Supplier: N/A    Potential Assistance Needed: may need [facility]    SNF needed: No    Pharmacy:  [address] [place] [address]       Does Patient want to use MEDS to BEDS? No    Family Members/Caregivers that pt would like involved in their care:    Yes    If yes, list name here:  Luis F (parents)    Transportation Provider:  Family                      Discharge Plan:  Home without needs VS. Home with [facility].                  Electronically signed by: Anamika Jiang RN on 6/30/2018 at 11:04 AM

## 2018-06-30 NOTE — ED NOTES
Writer noted a DSD to Pt's (R) buttock. No redness or swelling noted around that area.       Kyle Vaz, MARY JANE  06/30/18 [id number]

## 2018-07-01 PROBLEM — M70.42 PREPATELLAR BURSITIS OF LEFT KNEE: Status: ACTIVE | Noted: 2018-07-01

## 2018-07-01 LAB
ABSOLUTE EOS #: 0.4 K/UL (ref 0–0.4)
ABSOLUTE IMMATURE GRANULOCYTE: ABNORMAL K/UL (ref 0–0.3)
ABSOLUTE LYMPH #: 1.5 K/UL (ref 1–4.8)
ABSOLUTE MONO #: 1 K/UL (ref 0.1–1.3)
ANION GAP SERPL CALCULATED.3IONS-SCNC: 12 MMOL/L (ref 9–17)
BASOPHILS # BLD: 1 % (ref 0–2)
BASOPHILS ABSOLUTE: 0.1 K/UL (ref 0–0.2)
BUN BLDV-MCNC: 10 MG/DL (ref 6–20)
BUN/CREAT BLD: ABNORMAL (ref 9–20)
CALCIUM SERPL-MCNC: 9 MG/DL (ref 8.6–10.4)
CHLORIDE BLD-SCNC: 105 MMOL/L (ref 98–107)
CO2: 23 MMOL/L (ref 20–31)
CREAT SERPL-MCNC: 0.81 MG/DL (ref 0.7–1.2)
CREAT SERPL-MCNC: 0.85 MG/DL (ref 0.7–1.2)
DIFFERENTIAL TYPE: ABNORMAL
EOSINOPHILS RELATIVE PERCENT: 5 % (ref 0–4)
GFR AFRICAN AMERICAN: >60 ML/MIN
GFR AFRICAN AMERICAN: >60 ML/MIN
GFR NON-AFRICAN AMERICAN: >60 ML/MIN
GFR NON-AFRICAN AMERICAN: >60 ML/MIN
GFR SERPL CREATININE-BSD FRML MDRD: ABNORMAL ML/MIN/{1.73_M2}
GFR SERPL CREATININE-BSD FRML MDRD: ABNORMAL ML/MIN/{1.73_M2}
GFR SERPL CREATININE-BSD FRML MDRD: NORMAL ML/MIN/{1.73_M2}
GFR SERPL CREATININE-BSD FRML MDRD: NORMAL ML/MIN/{1.73_M2}
GLUCOSE BLD-MCNC: 107 MG/DL (ref 70–99)
HCT VFR BLD CALC: 37.9 % (ref 41–53)
HEMOGLOBIN: 12.4 G/DL (ref 13.5–17.5)
IMMATURE GRANULOCYTES: ABNORMAL %
LYMPHOCYTES # BLD: 16 % (ref 24–44)
MCH RBC QN AUTO: 28.2 PG (ref 26–34)
MCHC RBC AUTO-ENTMCNC: 32.6 G/DL (ref 31–37)
MCV RBC AUTO: 86.3 FL (ref 80–100)
MONOCYTES # BLD: 11 % (ref 1–7)
NRBC AUTOMATED: ABNORMAL PER 100 WBC
PDW BLD-RTO: 14.5 % (ref 11.5–14.9)
PLATELET # BLD: 227 K/UL (ref 150–450)
PLATELET ESTIMATE: ABNORMAL
PMV BLD AUTO: 8.6 FL (ref 6–12)
POTASSIUM SERPL-SCNC: 4.4 MMOL/L (ref 3.7–5.3)
RBC # BLD: 4.39 M/UL (ref 4.5–5.9)
RBC # BLD: ABNORMAL 10*6/UL
SEG NEUTROPHILS: 67 % (ref 36–66)
SEGMENTED NEUTROPHILS ABSOLUTE COUNT: 6 K/UL (ref 1.3–9.1)
SODIUM BLD-SCNC: 140 MMOL/L (ref 135–144)
VANCOMYCIN TROUGH DATE LAST DOSE: ABNORMAL
VANCOMYCIN TROUGH DOSE AMOUNT: 1000
VANCOMYCIN TROUGH TIME LAST DOSE: 117
VANCOMYCIN TROUGH: 6.2 UG/ML (ref 10–20)
WBC # BLD: 9 K/UL (ref 3.5–11)
WBC # BLD: ABNORMAL 10*3/UL

## 2018-07-01 PROCEDURE — 82565 ASSAY OF CREATININE: CPT

## 2018-07-01 PROCEDURE — 99222 1ST HOSP IP/OBS MODERATE 55: CPT | Performed by: NURSE PRACTITIONER

## 2018-07-01 PROCEDURE — 87205 SMEAR GRAM STAIN: CPT

## 2018-07-01 PROCEDURE — 2580000003 HC RX 258: Performed by: EMERGENCY MEDICINE

## 2018-07-01 PROCEDURE — 99232 SBSQ HOSP IP/OBS MODERATE 35: CPT | Performed by: INTERNAL MEDICINE

## 2018-07-01 PROCEDURE — 2580000003 HC RX 258: Performed by: INTERNAL MEDICINE

## 2018-07-01 PROCEDURE — 87070 CULTURE OTHR SPECIMN AEROBIC: CPT

## 2018-07-01 PROCEDURE — 80202 ASSAY OF VANCOMYCIN: CPT

## 2018-07-01 PROCEDURE — 36415 COLL VENOUS BLD VENIPUNCTURE: CPT

## 2018-07-01 PROCEDURE — 20610 DRAIN/INJ JOINT/BURSA W/O US: CPT | Performed by: ORTHOPAEDIC SURGERY

## 2018-07-01 PROCEDURE — 85025 COMPLETE CBC W/AUTO DIFF WBC: CPT

## 2018-07-01 PROCEDURE — 6360000002 HC RX W HCPCS: Performed by: EMERGENCY MEDICINE

## 2018-07-01 PROCEDURE — 1200000000 HC SEMI PRIVATE

## 2018-07-01 PROCEDURE — 99222 1ST HOSP IP/OBS MODERATE 55: CPT | Performed by: ORTHOPAEDIC SURGERY

## 2018-07-01 PROCEDURE — 87186 SC STD MICRODIL/AGAR DIL: CPT

## 2018-07-01 PROCEDURE — 6370000000 HC RX 637 (ALT 250 FOR IP): Performed by: INTERNAL MEDICINE

## 2018-07-01 PROCEDURE — 80048 BASIC METABOLIC PNL TOTAL CA: CPT

## 2018-07-01 PROCEDURE — 6360000002 HC RX W HCPCS: Performed by: INTERNAL MEDICINE

## 2018-07-01 PROCEDURE — 87075 CULTR BACTERIA EXCEPT BLOOD: CPT

## 2018-07-01 PROCEDURE — 86403 PARTICLE AGGLUT ANTBDY SCRN: CPT

## 2018-07-01 RX ADMIN — PIPERACILLIN SODIUM AND TAZOBACTAM SODIUM 3.38 G: 3; .375 INJECTION, POWDER, LYOPHILIZED, FOR SOLUTION INTRAVENOUS at 16:43

## 2018-07-01 RX ADMIN — METOPROLOL TARTRATE 25 MG: 25 TABLET ORAL at 21:41

## 2018-07-01 RX ADMIN — SODIUM CHLORIDE: 9 INJECTION, SOLUTION INTRAVENOUS at 09:26

## 2018-07-01 RX ADMIN — METOPROLOL TARTRATE 25 MG: 25 TABLET ORAL at 09:23

## 2018-07-01 RX ADMIN — PIPERACILLIN SODIUM AND TAZOBACTAM SODIUM 3.38 G: 3; .375 INJECTION, POWDER, LYOPHILIZED, FOR SOLUTION INTRAVENOUS at 11:01

## 2018-07-01 RX ADMIN — VANCOMYCIN HYDROCHLORIDE 1000 MG: 1 INJECTION, POWDER, LYOPHILIZED, FOR SOLUTION INTRAVENOUS at 22:08

## 2018-07-01 RX ADMIN — VANCOMYCIN HYDROCHLORIDE 1000 MG: 1 INJECTION, POWDER, LYOPHILIZED, FOR SOLUTION INTRAVENOUS at 13:56

## 2018-07-01 RX ADMIN — VANCOMYCIN HYDROCHLORIDE 1000 MG: 1 INJECTION, POWDER, LYOPHILIZED, FOR SOLUTION INTRAVENOUS at 01:17

## 2018-07-01 RX ADMIN — PIPERACILLIN SODIUM AND TAZOBACTAM SODIUM 3.38 G: 3; .375 INJECTION, POWDER, LYOPHILIZED, FOR SOLUTION INTRAVENOUS at 05:48

## 2018-07-01 RX ADMIN — DOCUSATE SODIUM 100 MG: 100 CAPSULE, LIQUID FILLED ORAL at 21:41

## 2018-07-01 NOTE — PLAN OF CARE
Problem: Falls - Risk of:  Goal: Will remain free from falls  Will remain free from falls   Outcome: Ongoing  No falls this shift. Call light with in reach, side rails up x2, bed in lowest postion. Patients safety maintained. Goal: Absence of physical injury  Absence of physical injury   Outcome: Ongoing  No injury this shift. Patients safety maintained. Problem: Risk for Impaired Skin Integrity  Goal: Tissue integrity - skin and mucous membranes  Structural intactness and normal physiological function of skin and  mucous membranes. Outcome: Ongoing  Patients pain level decreased with prescribed pain medications. Skin assessment as charted. No new areas of skin breakdown noted.

## 2018-07-01 NOTE — PLAN OF CARE
Problem: Falls - Risk of:  Goal: Will remain free from falls  Will remain free from falls   Outcome: Ongoing  Patient has been free from falls this shift. Call light is within reach, side rails up x2, bed in lowest position. Problem: Risk for Impaired Skin Integrity  Goal: Tissue integrity - skin and mucous membranes  Structural intactness and normal physiological function of skin and  mucous membranes. Outcome: Ongoing  Skin assessment as charted. Will continue to monitor.

## 2018-07-01 NOTE — FLOWSHEET NOTE
07/01/18 1232   Encounter Summary   Services provided to: Patient   Referral/Consult From: Isaac   Continue Visiting (7/1/18)   Complexity of Encounter Low   Length of Encounter 15 minutes   Routine   Type Initial   Spiritual/Zoroastrianism   Type Ritual   Intervention Anointing   Sacraments   Sacrament of Sick-Anointing Anointed  ([facility] 7/1/18)

## 2018-07-01 NOTE — CONSULTS
[facility] - Initial Consult Note  Today's Date and Time: 7/1/2018, 10:23 AM    Impression :   1. Lt knee cellulitis  2. Possible Lt knee septic arthritis  3. Chronic Rt gluteal wound  4. Spina bifida    Recommendations:   · Continue vancomycin for now  · Consider draining effusion for culture  · Wound care as ordered    Infection Control Recommendations   · [place] Precautions    Antimicrobial Stewardship Recommendations     · PK dosing    Coordination of Outpatient Care:   · Estimated Length of IV antimicrobials: TBD  · Patient will need Midline Catheter Insertion:   · Patient will need PICC line Insertion:  · Patient will need: Home IV , [place],  SNF,  LTAC No  · Patient will need outpatient wound care: Yes    Chief complaint/reason for consultation:   · Lt knee cellulitis      History of Present Illness:   Judd Guevara is a 32y.o.-year-old  male who was initially admitted on 6/29/2018. Patient seen at the request of Dr. Marychuy Butterfield    Pt has a history of spina bifida and chronic Rt gluteal wound who presented to the ER on 6/30 with a 2 day history of Lt knee redness and swelling. His sensation is decreased at his baseline. On exam he was noted to have edema, erythema and warmth extending to his upper thigh. His CRP was >200, Sed rate 5, WBC 14.1. Pt denies any chills or fevers. He has been diagnosed with cellulitis, and some concern for septic arthritis. He was started on Vancomycin and admitted for further care. CURRENT EXAM 7/1/2018  Pt is AAO  Reports that his knee is significantly improved today  The edema and redness decreased    Tmax 100.2  VSS    WBC 14.1->9.0  Cr 0.80->0.81    Lt knee with effusion, warmth  Rt gluteal wound clean              Cultures:  Blood:  · 6/29 x 2 no growth to date      Discussed with patient, RN, family.     I have personally reviewed the past medical history, past surgical history, medications, social history, and family history, and I have updated the database accordingly. Past Medical History:     Past Medical History:   Diagnosis Date    Chronic headaches     Complex partial seizures with consciousness impaired ([address]     Congenital hydrocephalus ([address]     Dysplasia of hip     Esotropia, unspecified     corrective surgery 2000    Myelomeningocele ([address]     L4 functional level    Neurogenic bladder     S/P  shunt     Scoliosis     Seizures ([address]     Tethered cord ([address]     Release 1993 and 1996       Past Surgical  History:     Past Surgical History:   Procedure Laterality Date    CYST INCISION AND DRAINAGE  01/10/2017    unroofing of chronic sinus tract right buttocks.     CYSTOSCOPY  5/29/15    EYE SURGERY      OTHER SURGICAL HISTORY Right 01/10/2017    unroofing buttock wound right side     SKIN BIOPSY Right 10/2/2017    EXCISION DEBRIDEMENT BUTTOCK WOUND WITH APPLICATION OF WOUND VAC performed by Joya Fried MD at [address]      T7-L4    VENTRICULOPERITONEAL SHUNT      last revised 1999       Medications:      sodium chloride flush  10 mL Intravenous 2 times per day    enoxaparin  40 mg Subcutaneous Daily    vancomycin  1,000 mg Intravenous Q12H    docusate sodium  100 mg Oral BID    piperacillin-tazobactam  3.375 g Intravenous Q6H    metoprolol tartrate  25 mg Oral BID    vancomycin (VANCOCIN) intermittent dosing (placeholder)   Other RX Placeholder       Social History:     Social History     Social History    Marital status: Single     Spouse name: N/A    Number of children: N/A    Years of education: N/A     Occupational History     [employer]     Social History Main Topics    Smoking status: Never Smoker    Smokeless tobacco: Never Used    Alcohol use No    Drug use: No    Sexual activity: No     Other Topics Concern    Not on file     Social History Narrative    No narrative on file       Family History:     Family History   Problem Relation Age of Onset without wheezes, rales, or rhonchi. Cardiovascular: Regular rate and rhythm without murmurs, rubs, or gallops. Abdomen: Soft, non tender. Bowel sounds normal. No organomegaly  All four Extremities: Lt knee with effusion, warmth, erythema  Neurologic: No gross sensory or motor deficits. Skin: Warm and dry with good turgor. No signs of peripheral arterial or venous insufficiency. Medical Decision Making -Laboratory:   I have independently reviewed/ordered the following labs:    CBC with Differential: Recent Labs      06/29/18   2335  07/01/18   0924   WBC  14.1*  9.0   HGB  12.7*  12.4*   HCT  38.6*  37.9*   PLT  230  227   LYMPHOPCT  9*  16*   MONOPCT  8*  11*     BMP: Recent Labs      06/29/18   2335  07/01/18   0645  07/01/18   0924   NA  134*   --   140   K  3.8   --   4.4   CL  98   --   105   CO2  24   --   23   BUN  15   --   10   CREATININE  0.82  0.85  0.81     Hepatic Function Panel: Recent Labs      06/29/18   2335   PROT  7.7   LABALBU  4.2   BILITOT  0.72   ALKPHOS  101   ALT  34   AST  18       Lab Results   Component Value Date    RBC 4.39 07/01/2018    WBC 9.0 07/01/2018     Lab Results   Component Value Date    CREATININE 0.81 07/01/2018    GLUCOSE 107 07/01/2018       Medical Decision Making-Imaging:     Narrative   EXAMINATION:   3 XRAY VIEWS OF THE LEFT KNEE       6/30/2018 12:08 am       COMPARISON:   None.       HISTORY:   ORDERING SYSTEM PROVIDED HISTORY: swelling   TECHNOLOGIST PROVIDED HISTORY:   Reason for exam:->swelling   Ordering Physician Provided Reason for Exam: Redness & swelling of L knee   x2 days       FINDINGS:   No evidence of acute fracture or dislocation.  No focal osseous lesion.  No   evidence of joint effusion. No focal soft tissue abnormality.           Impression   No acute abnormality of the knee. Medical Decision Making-Other: Thank you for allowing us to participate in the care of this patient. Please call with questions.     SWATI Parisi - CNP  Pager: [phone] - Office: [phone]      ATTESTATION:    I have discussed the case, including pertinent history and exam findings with the residents and Advanced Nurse Practitioner. I have seen and examined the patient and the key elements of the encounter have been performed by me. I have reviewed the laboratory data, other diagnostic studies and discussed them with the residents. I have updated the medical record where necessary. I agree with the assessment, plan and orders as documented by the resident.     Wanda Aase, MD.

## 2018-07-01 NOTE — H&P
[facility]      [facility]     HISTORY AND PHYSICAL EXAMINATION            Date:   6/30/2018  Patient name:  Rodríguez Diallo  Date of admission:  6/29/2018 11:19 PM  MRN:   [id number]  Account:  [de-identified]  YOB: 1990  PCP:    SWATI Pérez CNP  Room:   [facility]  Code Status:    Full Code    Chief Complaint:     Chief Complaint   Patient presents with    Knee Pain       History Obtained From:     Patient with mother     History of Present Illness: The patient is a 32 y.o. Non-/non  male who presents with Knee Pain   and he is admitted to the hospital for the management of  Left knee pain         HPI   1) Location/Symptom left knee swelling and redness   2) Timing/Onset: 2 days worsening no fever   3) Context/Setting: started after abrasion   4) Quality: sharp pain redness   5) Duration: cont   6) Modifying Factors: hx of spina bifida with no sensation of both legs has sacral wound   7) Severity: severe     Left knee cellulitis in pt with spina bifida with sacral wound   Concern for left knee joint involvement        Past Medical History:     Past Medical History:   Diagnosis Date    Chronic headaches     Complex partial seizures with consciousness impaired ([address]     Congenital hydrocephalus (HCC)     Dysplasia of hip     Esotropia, unspecified     corrective surgery 2000    Myelomeningocele ([address]     L4 functional level    Neurogenic bladder     S/P  shunt     Scoliosis     Seizures ([address]     Tethered cord ([address]     Release 1993 and 1996        Past Surgical History:     Past Surgical History:   Procedure Laterality Date    CYST INCISION AND DRAINAGE  01/10/2017    unroofing of chronic sinus tract right buttocks.     CYSTOSCOPY  5/29/15    EYE SURGERY      OTHER SURGICAL HISTORY Right 01/10/2017    unroofing buttock wound right side     SKIN BIOPSY Right 10/2/2017    EXCISION DEBRIDEMENT BUTTOCK WOUND WITH APPLICATION OF WOUND VAC performed by Lily Isaacs MD at [address] [place] [address]      last revised 1999        Medications Prior to Admission:     Prior to Admission medications    Medication Sig Start Date End Date Taking? Authorizing Provider   Handicap Placard MISC by Does not apply route Myelomeningocele ([facility]  (primary encounter diagnosis)  Congenital hydrocephalus ([facility]  Multiple allergies    Duration:  5 years 3/13/18  Yes SWATI Aguirre CNP   loratadine (CLARITIN) 10 MG tablet Take 1 tablet by mouth daily 3/13/18  Yes SWATI Aguirre CNP   Gauze Pads & Dressings (GAUZE DRESSING) 4\"X4\" PADS 1 each by Does not apply route daily 7/11/17  Yes Lily Isaacs MD   docusate sodium (COLACE) 100 MG capsule Take 1 capsule by mouth 2 times daily 1/10/17  Yes Lily Isaacs MD   Gauze Pads & Dressings (NU GAUZE PACKING STRIPS) MISC 1/4 inch plain packing gauze wet to dry to right buttock wound once daily cover with dry dressing 1/10/17  Yes Lily Isaacs MD   Multiple Vitamins-Minerals (THERAPEUTIC MULTIVITAMIN-MINERALS) tablet Take 1 tablet by mouth daily   Yes Historical Provider, MD   Riboflavin (VITAMIN B-2 PO) Take 1 tablet by mouth   Yes Historical Provider, MD   EPINEPHrine (EPIPEN 2-ERIKA) 0.3 MG/0.3ML SOAJ injection Inject as needed 3/13/18   SWATI Aguirre CNP        Allergies:     Latex; Ceclor [cefaclor]; Seasonal; Other; and Peanuts [peanut oil]    Social History:     Tobacco:    reports that he has never smoked. He has never used smokeless tobacco.  Alcohol:      reports that he does not drink alcohol. Drug Use:  reports that he does not use drugs.     Family History:     Family History   Problem Relation Age of Onset    Heart Disease Father     Heart Surgery Father     Hypertension Father     Heart Disease Paternal Grandfather     Heart Failure Eosinophils % 0 0 - 4 %    Basophils 1 0 - 2 %    Immature Granulocytes NOT REPORTED 0 %    Segs Absolute 11.60 (H) 1.3 - 9.1 k/uL    Absolute Lymph # 1.30 1.0 - 4.8 k/uL    Absolute Mono # 1.10 0.1 - 1.3 k/uL    Absolute Eos # 0.10 0.0 - 0.4 k/uL    Basophils # 0.10 0.0 - 0.2 k/uL    Absolute Immature Granulocyte NOT REPORTED 0.00 - 0.30 k/uL    WBC Morphology NOT REPORTED     RBC Morphology NOT REPORTED     Platelet Estimate NOT REPORTED    Culture Blood #1    Collection Time: 06/29/18 11:35 PM   Result Value Ref Range    Specimen Description . BLOOD LEFT FOREARM, 10 ML LAV, 10 ML RED     Special Requests NOT REPORTED     Culture NO GROWTH 19 HOURS     Status Pending    Lactic Acid    Collection Time: 06/29/18 11:35 PM   Result Value Ref Range    Lactic Acid 1.3 0.5 - 2.2 mmol/L   Sedimentation Rate    Collection Time: 06/29/18 11:35 PM   Result Value Ref Range    Sed Rate 5 0 - 15 mm   C-Reactive Protein    Collection Time: 06/29/18 11:35 PM   Result Value Ref Range    .0 (H) 0.0 - 5.0 mg/L   Culture Blood #1    Collection Time: 06/29/18 11:40 PM   Result Value Ref Range    Specimen Description . BLOOD LEFT ARM, 10 ML  LAV, 10 ML RED     Special Requests NOT REPORTED     Culture NO GROWTH 19 HOURS     Status Pending    URIC ACID    Collection Time: 06/30/18  3:50 PM   Result Value Ref Range    Uric Acid 3.4 3.4 - 7.0 mg/dL         Assessment :      Primary Problem  Cellulitis    Active Hospital Problems    Diagnosis Date Noted    Cellulitis [L03.90] 06/30/2018    Wound of right buttock [S31.819A] 07/21/2016    S/P  shunt [Z98.2]     Scoliosis [M41.9]     Complex partial seizures with consciousness impaired ([facility] [G40.209]        Plan:     Patient status Admit as inpt     1. vanco add zosyn ( sacral wound )   2. Elevated crp   3.  Concern for joint involvement consult ortho     Consultations:   PHARMACY TO DOSE VANCOMYCIN  IP CONSULT TO INTERNAL MEDICINE  IP CONSULT TO INFECTIOUS DISEASES  IP CONSULT TO

## 2018-07-01 NOTE — CARE COORDINATION
ONGOING DISCHARGE PLAN:    Spoke with patient regarding discharge plan and patient confirms that plan is still home. Mother present in the room and agrees with plan. They both remain agreeable to [facility] if needed.  (they request nurse, Fadi Walsh). Pt remains on IV Zosyn and Vanco.    Will continue to follow for additional discharge needs.     Electronically signed by Siva Simpson RN on 7/1/2018 at 1:55 PM

## 2018-07-01 NOTE — CONSULTS
improved    Drains:No  Imaging:No      Medications:    vancomycin  1,000 mg Intravenous Q8H    sodium chloride flush  10 mL Intravenous 2 times per day    enoxaparin  40 mg Subcutaneous Daily    docusate sodium  100 mg Oral BID    piperacillin-tazobactam  3.375 g Intravenous Q6H    metoprolol tartrate  25 mg Oral BID    vancomycin (VANCOCIN) intermittent dosing (placeholder)   Other RX Placeholder     PRN Meds:sodium chloride flush, acetaminophen, ondansetron      Data:  CBC: Recent Labs      06/29/18   2335  07/01/18   0924   WBC  14.1*  9.0   RBC  4.58  4.39*   HGB  12.7*  12.4*   HCT  38.6*  37.9*   MCV  84.3  86.3   RDW  14.2  14.5   PLT  230  227     BNP: No results for input(s): BNP in the last 72 hours. PT/INR: No results for input(s): PROTIME, INR in the last 72 hours. Assessment:     Principal Problem:    Cellulitis  Active Problems:    S/P  shunt    Scoliosis    Complex partial seizures with consciousness impaired (HCC)    Wound of right buttock    Prepatellar bursitis of left knee  Resolved Problems:    * No resolved hospital problems.  *      Plan:     Rbc discussed  Bursa aspirated purulent but thin (not thick pus)      Electronically signed by Maegan Reed MD on 7/1/2018 at 4:40 PM    [address] [place] [address]

## 2018-07-02 ENCOUNTER — APPOINTMENT (OUTPATIENT)
Dept: CT IMAGING | Age: 28
DRG: 351 | End: 2018-07-02
Payer: COMMERCIAL

## 2018-07-02 LAB
C-REACTIVE PROTEIN: 96.1 MG/L (ref 0–5)
CREAT SERPL-MCNC: 1.04 MG/DL (ref 0.7–1.2)
GFR AFRICAN AMERICAN: >60 ML/MIN
GFR NON-AFRICAN AMERICAN: >60 ML/MIN
GFR SERPL CREATININE-BSD FRML MDRD: NORMAL ML/MIN/{1.73_M2}
GFR SERPL CREATININE-BSD FRML MDRD: NORMAL ML/MIN/{1.73_M2}
SEDIMENTATION RATE, ERYTHROCYTE: 63 MM (ref 0–15)
VANCOMYCIN TROUGH DATE LAST DOSE: NORMAL
VANCOMYCIN TROUGH DOSE AMOUNT: NORMAL
VANCOMYCIN TROUGH TIME LAST DOSE: 559
VANCOMYCIN TROUGH: 12.1 UG/ML (ref 10–20)

## 2018-07-02 PROCEDURE — 82565 ASSAY OF CREATININE: CPT

## 2018-07-02 PROCEDURE — 80202 ASSAY OF VANCOMYCIN: CPT

## 2018-07-02 PROCEDURE — 36415 COLL VENOUS BLD VENIPUNCTURE: CPT

## 2018-07-02 PROCEDURE — 6370000000 HC RX 637 (ALT 250 FOR IP): Performed by: INTERNAL MEDICINE

## 2018-07-02 PROCEDURE — 73701 CT LOWER EXTREMITY W/DYE: CPT

## 2018-07-02 PROCEDURE — 6360000004 HC RX CONTRAST MEDICATION: Performed by: INTERNAL MEDICINE

## 2018-07-02 PROCEDURE — 99232 SBSQ HOSP IP/OBS MODERATE 35: CPT | Performed by: INTERNAL MEDICINE

## 2018-07-02 PROCEDURE — 99254 IP/OBS CNSLTJ NEW/EST MOD 60: CPT | Performed by: INTERNAL MEDICINE

## 2018-07-02 PROCEDURE — 2580000003 HC RX 258: Performed by: INTERNAL MEDICINE

## 2018-07-02 PROCEDURE — 85651 RBC SED RATE NONAUTOMATED: CPT

## 2018-07-02 PROCEDURE — 6360000002 HC RX W HCPCS: Performed by: INTERNAL MEDICINE

## 2018-07-02 PROCEDURE — 1200000000 HC SEMI PRIVATE

## 2018-07-02 PROCEDURE — 99231 SBSQ HOSP IP/OBS SF/LOW 25: CPT | Performed by: ORTHOPAEDIC SURGERY

## 2018-07-02 PROCEDURE — 86140 C-REACTIVE PROTEIN: CPT

## 2018-07-02 RX ORDER — SODIUM CHLORIDE 0.9 % (FLUSH) 0.9 %
10 SYRINGE (ML) INJECTION PRN
Status: DISCONTINUED | OUTPATIENT
Start: 2018-07-02 | End: 2018-07-03 | Stop reason: HOSPADM

## 2018-07-02 RX ORDER — SULFAMETHOXAZOLE AND TRIMETHOPRIM 800; 160 MG/1; MG/1
1 TABLET ORAL 2 TIMES DAILY
Qty: 28 TABLET | Refills: 0 | Status: SHIPPED | OUTPATIENT
Start: 2018-07-02 | End: 2018-07-03 | Stop reason: HOSPADM

## 2018-07-02 RX ORDER — 0.9 % SODIUM CHLORIDE 0.9 %
80 INTRAVENOUS SOLUTION INTRAVENOUS ONCE
Status: COMPLETED | OUTPATIENT
Start: 2018-07-02 | End: 2018-07-02

## 2018-07-02 RX ADMIN — VANCOMYCIN HYDROCHLORIDE 1000 MG: 1 INJECTION, POWDER, LYOPHILIZED, FOR SOLUTION INTRAVENOUS at 23:42

## 2018-07-02 RX ADMIN — METOPROLOL TARTRATE 25 MG: 25 TABLET ORAL at 19:50

## 2018-07-02 RX ADMIN — IOPAMIDOL 75 ML: 755 INJECTION, SOLUTION INTRAVENOUS at 19:04

## 2018-07-02 RX ADMIN — PIPERACILLIN SODIUM AND TAZOBACTAM SODIUM 3.38 G: 3; .375 INJECTION, POWDER, LYOPHILIZED, FOR SOLUTION INTRAVENOUS at 05:23

## 2018-07-02 RX ADMIN — SODIUM CHLORIDE 80 ML: 9 INJECTION, SOLUTION INTRAVENOUS at 19:04

## 2018-07-02 RX ADMIN — PIPERACILLIN SODIUM AND TAZOBACTAM SODIUM 3.38 G: 3; .375 INJECTION, POWDER, LYOPHILIZED, FOR SOLUTION INTRAVENOUS at 00:12

## 2018-07-02 RX ADMIN — VANCOMYCIN HYDROCHLORIDE 1000 MG: 1 INJECTION, POWDER, LYOPHILIZED, FOR SOLUTION INTRAVENOUS at 16:43

## 2018-07-02 RX ADMIN — PIPERACILLIN SODIUM AND TAZOBACTAM SODIUM 3.38 G: 3; .375 INJECTION, POWDER, LYOPHILIZED, FOR SOLUTION INTRAVENOUS at 11:36

## 2018-07-02 RX ADMIN — METOPROLOL TARTRATE 25 MG: 25 TABLET ORAL at 07:29

## 2018-07-02 RX ADMIN — VANCOMYCIN HYDROCHLORIDE 1000 MG: 1 INJECTION, POWDER, LYOPHILIZED, FOR SOLUTION INTRAVENOUS at 05:59

## 2018-07-02 RX ADMIN — Medication 10 ML: at 19:04

## 2018-07-02 RX ADMIN — DOCUSATE SODIUM 100 MG: 100 CAPSULE, LIQUID FILLED ORAL at 19:51

## 2018-07-02 RX ADMIN — DOCUSATE SODIUM 100 MG: 100 CAPSULE, LIQUID FILLED ORAL at 07:29

## 2018-07-02 NOTE — PLAN OF CARE
Problem: Falls - Risk of:  Goal: Will remain free from falls  Will remain free from falls   Outcome: Ongoing  Patient has been free from falls this shift. Call light is within reach, side rails up x2, bed in lowest position. Problem: Risk for Impaired Skin Integrity  Goal: Tissue integrity - skin and mucous membranes  Structural intactness and normal physiological function of skin and  mucous membranes. Outcome: Ongoing  Skin assessment as charted. No areas of skin breakdown noted.

## 2018-07-02 NOTE — CONSULTS
Infectious disease Consult Note      Patient: Harshil Canales  : 1990  Acct#:  [id number]     Date:  2018    Subjective:       History of Present Illness  Patient is a 32 y.o.  male admitted with Cellulitis [L03.90] who is seen in consult for the same . The patient presented with left anterior knee redness and swelling. He denied pain, had decrease sensation to the leg due to spina bifida. Knee aspiration was done and the fluid is growing gram-positive cocci in clusters. The patient denied any recent trauma. He does have chronic sacral wound, follows at wound care clinic. He denied fever or chills, nausea vomiting or diarrhea. He does have a neurogenic Bladder, straight cath as needed . Past Medical History:   Diagnosis Date    Chronic headaches     Complex partial seizures with consciousness impaired ([address]     Congenital hydrocephalus (HCC)     Dysplasia of hip     Esotropia, unspecified     corrective surgery     Myelomeningocele ([address]     L4 functional level    Neurogenic bladder     Prepatellar bursitis of left knee 2018    S/P  shunt     Scoliosis     Seizures ([address]     Tethered cord ([address]     Release  and       Past Surgical History:   Procedure Laterality Date    CYST INCISION AND DRAINAGE  01/10/2017    unroofing of chronic sinus tract right buttocks.  CYSTOSCOPY  5/29/15    EYE SURGERY      OTHER SURGICAL HISTORY Right 01/10/2017    unroofing buttock wound right side     SKIN BIOPSY Right 10/2/2017    EXCISION DEBRIDEMENT BUTTOCK WOUND WITH APPLICATION OF WOUND VAC performed by Katelyn Perez MD at [address] [place] [address]      last revised           Admission Meds  No current facility-administered medications on file prior to encounter.       Current Outpatient Prescriptions on File Prior to Encounter   Medication Sig Dispense Refill    Handicap Placard MISC by Does not apply route well-developed and well-nourished, in no acute distress  Skin: warm and dry, no rash   Sacral decubitus ulcer with no signs of infection  Head: normocephalic and atraumatic    ENT: hearing grossly normal bilaterally  Neck: neck supple   Pulmonary/Chest: clear to auscultation bilaterally- no wheezes, rales or rhonchi, normal air movement, no respiratory distress  Cardiovascular: normal rate, regular rhythm, normal S1 and S2, no murmurs  Abdomen: soft, non-tender, non-distended  Extremities: no cyanosis, clubbing , left anterior knee swelling, redness, no decrease range of motion       Data Review:    Recent Labs      06/29/18   2335  07/01/18   0924   WBC  14.1*  9.0   HGB  12.7*  12.4*   HCT  38.6*  37.9*   MCV  84.3  86.3   PLT  230  227     Recent Labs      06/29/18   2335  07/01/18   0645  07/01/18   0924  07/02/18   0642   NA  134*   --   140   --    K  3.8   --   4.4   --    CL  98   --   105   --    CO2  24   --   23   --    BUN  15   --   10   --    CREATININE  0.82  0.85  0.81  1.04     Recent Labs      06/29/18   2335   AST  18   ALT  34   BILITOT  0.72   ALKPHOS  101        Imaging Studies:                           All appropriate imaging studies and reports reviewed: Yes                 Assessment:   Left knee Prepatellar bursitis   Sacral decubitus ulcer. Neurogenic bladder  Spina bifida    S/P  shunt         Recommendations:   D/c Zosyn   Cont IV Vancomycin   Ct left knee   Follow cultures and adjust antibiotics as needed    Thank you for allowing me to participate in the care of your patient. Please feel free to contact me with any questions or concerns.      Yosvany Vang MD

## 2018-07-02 NOTE — PLAN OF CARE
Problem: Falls - Risk of:  Goal: Will remain free from falls  Will remain free from falls   Outcome: Ongoing  No falls noted

## 2018-07-03 VITALS
BODY MASS INDEX: 30.82 KG/M2 | SYSTOLIC BLOOD PRESSURE: 114 MMHG | DIASTOLIC BLOOD PRESSURE: 77 MMHG | HEART RATE: 96 BPM | TEMPERATURE: 97.9 F | RESPIRATION RATE: 16 BRPM | HEIGHT: 60 IN | OXYGEN SATURATION: 99 % | WEIGHT: 156.97 LBS

## 2018-07-03 LAB
CREAT SERPL-MCNC: 0.82 MG/DL (ref 0.7–1.2)
GFR AFRICAN AMERICAN: >60 ML/MIN
GFR NON-AFRICAN AMERICAN: >60 ML/MIN
GFR SERPL CREATININE-BSD FRML MDRD: NORMAL ML/MIN/{1.73_M2}
GFR SERPL CREATININE-BSD FRML MDRD: NORMAL ML/MIN/{1.73_M2}

## 2018-07-03 PROCEDURE — 2580000003 HC RX 258: Performed by: INTERNAL MEDICINE

## 2018-07-03 PROCEDURE — 82565 ASSAY OF CREATININE: CPT

## 2018-07-03 PROCEDURE — 99233 SBSQ HOSP IP/OBS HIGH 50: CPT | Performed by: INTERNAL MEDICINE

## 2018-07-03 PROCEDURE — 36415 COLL VENOUS BLD VENIPUNCTURE: CPT

## 2018-07-03 PROCEDURE — 6360000002 HC RX W HCPCS: Performed by: INTERNAL MEDICINE

## 2018-07-03 PROCEDURE — 99239 HOSP IP/OBS DSCHRG MGMT >30: CPT | Performed by: INTERNAL MEDICINE

## 2018-07-03 PROCEDURE — 6370000000 HC RX 637 (ALT 250 FOR IP): Performed by: INTERNAL MEDICINE

## 2018-07-03 PROCEDURE — 99231 SBSQ HOSP IP/OBS SF/LOW 25: CPT | Performed by: ORTHOPAEDIC SURGERY

## 2018-07-03 RX ORDER — LINEZOLID 600 MG/1
600 TABLET, FILM COATED ORAL 2 TIMES DAILY
Qty: 20 TABLET | Refills: 0
Start: 2018-07-03 | End: 2018-07-13

## 2018-07-03 RX ORDER — DOXYCYCLINE HYCLATE 100 MG/1
100 CAPSULE ORAL 2 TIMES DAILY
Qty: 20 CAPSULE | Refills: 0 | Status: SHIPPED | OUTPATIENT
Start: 2018-07-03 | End: 2018-07-03 | Stop reason: HOSPADM

## 2018-07-03 RX ADMIN — ACETAMINOPHEN 650 MG: 325 TABLET, FILM COATED ORAL at 09:47

## 2018-07-03 RX ADMIN — METOPROLOL TARTRATE 25 MG: 25 TABLET ORAL at 09:47

## 2018-07-03 RX ADMIN — DOCUSATE SODIUM 100 MG: 100 CAPSULE, LIQUID FILLED ORAL at 09:47

## 2018-07-03 RX ADMIN — VANCOMYCIN HYDROCHLORIDE 1000 MG: 1 INJECTION, POWDER, LYOPHILIZED, FOR SOLUTION INTRAVENOUS at 09:47

## 2018-07-03 ASSESSMENT — PAIN SCALES - GENERAL
PAINLEVEL_OUTOF10: 3
PAINLEVEL_OUTOF10: 2

## 2018-07-03 ASSESSMENT — PAIN DESCRIPTION - LOCATION: LOCATION: HEAD

## 2018-07-03 ASSESSMENT — PAIN DESCRIPTION - PAIN TYPE: TYPE: ACUTE PAIN

## 2018-07-03 NOTE — PROGRESS NOTES
[address] [facility].    Date:   7/1/2018  Patient name:  Cat Rosenberg  Date of admission:  6/29/2018 11:19 PM  MRN:   [id number]  YOB: 1990    CC- left knee cellulitis    HPI-    Pt with left knee cellulitis   Significantly improved redness     REVIEW OF SYSTEMS:    · General----negative for fatigue, weight loss  · GI negative for nausea and vomiting, no dysphagia       EXAM-  /79   Pulse 98   Temp 97.5 °F (36.4 °C) (Axillary)   Resp 14   Ht 5' (1.524 m)   Wt 155 lb (70.3 kg)   SpO2 99%   BMI 30.27 kg/m²      · General appearance: NAD conversant  · Lungs: normal effort, clear to auscultation bilaterally,no wheeze. · Heart: regular rate and rhythm, S1, S2 normal, no murmur  · Abdomen: soft, non-tender; no masses, no organomegaly  · Extremities: left knee redness improved       Laboratory Testing:  CBC:   Recent Labs      07/01/18   0924   WBC  9.0   HGB  12.4*   PLT  227     BMP:    Recent Labs      06/29/18   2335  07/01/18   0645  07/01/18   0924   NA  134*   --   140   K  3.8   --   4.4   CL  98   --   105   CO2  24   --   23   BUN  15   --   10   CREATININE  0.82  0.85  0.81   GLUCOSE  105*   --   107*         ASSESSMENT:    Patient Active Problem List   Diagnosis    [facility]    Congenital hydrocephalus ([facility]    S/P  shunt    Scoliosis    Complex partial seizures with consciousness impaired (HCC)    Chronic headaches    Wound of right buttock    Surgical wound, non healing    Decubitus ulcer of right buttock, stage 3 (HCC)    Cellulitis       PLAN:  1. vanco +  zosyn ( sacral wound )   2. Elevated crp   3. Concern for joint involvement consult ortho      MD BHAVANI Nixon[address]    Phone [phone]   Fax: [phone]  Answering Service: [phone]
Bedside rounding done, per Neto Em and Maximus RN's  IV tubings labeled  Pt. Cont.  Down for CT scan at this time
Dr Sangeeta Gould called and notified of consult at 2221
HOB up  @  40 degrees  NO c/o of pain,or SOB at this time    Resp.  Easy  @  18/min
Infectious disease Consult Note      Patient: Donovan Sarah  : 1990  Acct#:  [id number]     Date:  7/3/2018    Subjective:       History of Present Illness  Patient is a 32 y.o.  male admitted with Cellulitis [L03.90] who is seen in consult for the same . The patient presented with left anterior knee redness and swelling. He denied pain, had decrease sensation to the leg due to spina bifida. Knee aspiration was done and the fluid grew staph aureus . The patient denied any recent trauma. He does have chronic sacral wound, follows at wound care clinic. Ct left knee suggestive of Prepatellar bursitis. He denied fever or chills, nausea vomiting or diarrhea. He does have a neurogenic Bladder, straight cath as needed . Past Medical History:   Diagnosis Date    Chronic headaches     Complex partial seizures with consciousness impaired ([address]     Congenital hydrocephalus (HCC)     Dysplasia of hip     Esotropia, unspecified     corrective surgery     Myelomeningocele ([address]     L4 functional level    Neurogenic bladder     Prepatellar bursitis of left knee 2018    S/P  shunt     Scoliosis     Seizures ([address]     Tethered cord ([address]     Release  and       Past Surgical History:   Procedure Laterality Date    CYST INCISION AND DRAINAGE  01/10/2017    unroofing of chronic sinus tract right buttocks.  CYSTOSCOPY  5/29/15    EYE SURGERY      OTHER SURGICAL HISTORY Right 01/10/2017    unroofing buttock wound right side     SKIN BIOPSY Right 10/2/2017    EXCISION DEBRIDEMENT BUTTOCK WOUND WITH APPLICATION OF WOUND VAC performed by Dagmar Barcenas MD at [address] [place] [address]      last revised           Admission Meds  No current facility-administered medications on file prior to encounter.       Current Outpatient Prescriptions on File Prior to Encounter   Medication Sig Dispense Refill    Handicap Placard MISC by
Patient continues to improve slowly    Cellulitis largely resolved    CRP dropping as expected    Sed rate up as expected    CT scan pre-patellar bursitis as expected    Plan ok to dc on po abx with fu
Patient discharged with all belongings and discharge instructions. Patient has mother at bedside. All medications are returned from nurse  to pharmacy.
Pharmacy Note  Vancomycin Consult    Judd Martins is a 32 y.o. male started on Vancomycin for cellulitis of left knee; consult received from Dr. Octavio Luo to manage therapy. Patient Active Problem List   Diagnosis    [facility]    Congenital hydrocephalus ([facility]    S/P  shunt    Scoliosis    Complex partial seizures with consciousness impaired (HCC)    Chronic headaches    Wound of right buttock    Surgical wound, non healing    Decubitus ulcer of right buttock, stage 3 (HCC)    Cellulitis       Allergies:  Latex; Seasonal; Ceclor [cefaclor]; Other; and Peanuts [peanut oil]     Temp max: 99.1    Recent Labs      06/29/18   2335   BUN  15       Recent Labs      06/29/18   2335   CREATININE  0.82       Recent Labs      06/29/18   2335   WBC  14.1*         Intake/Output Summary (Last 24 hours) at 06/30/18 0507  Last data filed at 06/30/18 0130   Gross per 24 hour   Intake 0 ml   Output 350 ml   Net -350 ml       Culture Date      Source                       Results  6/29                     [facility] X2    Ht Readings from Last 1 Encounters:   06/30/18 5' (1.524 m)        Wt Readings from Last 1 Encounters:   06/30/18 155 lb (70.3 kg)         Body mass index is 30.27 kg/m². Estimated Creatinine Clearance: 111 mL/min (based on SCr of 0.82 mg/dL). Goal Trough Level: 10-20 mcg/mL    Assessment/Plan:  Will initiate vancomycin 1000 mg IV every 12 hours. Timing of trough level will be determined based on culture results, renal function, and clinical response. Thank you for the consult. Will continue to follow.
Pharmacy Vancomycin Consult     Vancomycin Day: 2  Current Dosin mg every 12 hours    Temp max:  37.9 C    Recent Labs      18   2335  18   0924   BUN  15  10       Recent Labs      18   0645  18   0924   CREATININE  0.85  0.81       Recent Labs      18   2335  18   0924   WBC  14.1*  9.0         Intake/Output Summary (Last 24 hours) at 18 1307  Last data filed at 18 0644   Gross per 24 hour   Intake 2107.5 ml   Output 400 ml   Net 1707.5 ml       Culture Date      Source               Results  18              blood x 2            pending       Ht Readings from Last 1 Encounters:   18 5' (1.524 m)        Wt Readings from Last 1 Encounters:   18 155 lb (70.3 kg)         Body mass index is 30.27 kg/m². Estimated Creatinine Clearance: 113 mL/min (based on SCr of 0.81 mg/dL). Trough: 6.2 11 hours after last dose    Assessment/Plan:  Increase vancomycin 1000 mg every 8 hours to target trough 10-20  Recheck level after 3 doses    Jay Smith, RPbailey.   [address]  2018 1:15 PM
Pharmacy Vancomycin Consult     Vancomycin Day: 3  Current Dosin gram q8h    Temp max:  99 F/37.2 C    Recent Labs      18   2335  18   0924   BUN  15  10       Recent Labs      18   0924  18   0642   CREATININE  0.81  1.04       Recent Labs      18   2335  18   0924   WBC  14.1*  9.0         Intake/Output Summary (Last 24 hours) at 18 1441  Last data filed at 18 1331   Gross per 24 hour   Intake 2813 ml   Output 650 ml   Net 2163 ml       Culture Date      Source                       Results                      blood x2                        fluid                            gram pos cocci clusters    Ht Readings from Last 1 Encounters:   18 5' (1.524 m)        Wt Readings from Last 1 Encounters:   18 156 lb 15.5 oz (71.2 kg)         Body mass index is 30.66 kg/m². Estimated Creatinine Clearance: 88 mL/min (based on SCr of 1.04 mg/dL). Trough: 12.1@ 1248    Assessment/Plan:  Trough within goal of 10-20 for cellulitis, plans to go home on oral Bactrim. Will continue current dose at this time. Pharmacy will follow.     Shanelle Campbell,PharmD, 2018, 3:02 PM
Surgical Progress Note    POD:      Patient doing well  Vitals:    18 0614   BP: 107/74   Pulse: 103   Resp: 16   Temp: 99 °F (37.2 °C)   SpO2: 97%      Temp (24hrs), Av.5 °F (36.9 °C), Min:97.5 °F (36.4 °C), Max:99 °F (37.2 °C)       Pain Control Good  No unusual nausea    Exam:  Continued decreased erythema and swelling      Lungs:  No respiratory distress    Labs reviewed:  Hemoglobin   Date/Time Value Ref Range Status   2018 09:24 AM 12.4 (L) 13.5 - 17.5 g/dL Final     Hematocrit   Date/Time Value Ref Range Status   2018 09:24 AM 37.9 (L) 41 - 53 % Final     WBC   Date/Time Value Ref Range Status   2018 09:24 AM 9.0 3.5 - 11.0 k/uL Final     Sodium   Date/Time Value Ref Range Status   2018 09:24  135 - 144 mmol/L Final     Potassium   Date/Time Value Ref Range Status   2018 09:24 AM 4.4 3.7 - 5.3 mmol/L Final     Chloride   Date/Time Value Ref Range Status   2018 09:24  98 - 107 mmol/L Final     CO2   Date/Time Value Ref Range Status   2018 09:24 AM 23 20 - 31 mmol/L Final       I/O last 3 completed shifts:   In: 3142.5 [I.V.:3142.5]  Out: -     Assessment:  Pre-patellar bursitis    Plan:  See my orders  Ok to dc on po  Vel Alfaro MD  2018 6:43 AM
CALCIUM  9.1   --   9.0   --      Recent Labs      06/29/18   2335  06/30/18   1550   PROT  7.7   --    LABALBU  4.2   --    AST  18   --    ALT  34   --    ALKPHOS  101   --    BILITOT  0.72   --    URICACID   --   3.4         Lab Results   Component Value Date/Time    SPECIAL NOT REPORTED 07/01/2018 05:14 PM     Lab Results   Component Value Date/Time    CULTURE (A) 07/01/2018 05:14 PM     POSITIVE FLUID CULTURE, RN NOTIFIED Vicente Serum B. AT 3095 ON 7/2/18    CULTURE (A) 07/01/2018 05:14 PM     DIRECT GRAM STAIN FROM BOTTLE: GRAM POSITIVE COCCI IN CLUSTERS       No results found for: POCPH, PHART, PH, POCPCO2, ZUX2MOX, PCO2, POCPO2, PO2ART, PO2, POCHCO3, EBF6QTK, HCO3, NBEA, PBEA, BEART, BE, THGBART, THB, BEV6OEL, REWJ8IXS, T3VXNCTW, O2SAT, FIO2    Radiology:        Physical Examination:        General appearance:  alert, cooperative and no distress  Mental Status:  oriented to person, place and time and normal affect  Lungs:  clear to auscultation bilaterally, normal effort  Heart:  regular rate and rhythm, no murmur  Abdomen:  soft, nontender, nondistended, normal bowel sounds, no masses, hepatomegaly, splenomegaly  Extremities:  Lower extremity atrophy left knee effusion and pain but good range of motion and  Skin:  no gross lesions, rashes, induration    Assessment:        Primary Problem  Cellulitis    Active Hospital Problems    Diagnosis Date Noted    Prepatellar bursitis of left knee [M70.42] 07/01/2018    Cellulitis [L03.90] 06/30/2018    Wound of right buttock [S31.819A] 07/21/2016    S/P  shunt [Z98.2]     Scoliosis [M41.9]     Complex partial seizures with consciousness impaired ([facility] [G40.209]        Plan:        1.  Left knee effusion bursitis versus septic arthritis Shows gram-positive Coke I rule out left knee staph aureus septic arthritis IV vancomycin and infectious disease consult orthopedics input    Randy Bella MD  7/2/2018  4:10 PM

## 2018-07-03 NOTE — CONSULTS
[facility] - Initial Consult Note  Today's Date and Time: 7/3/2018, 5:17 PM    Impression :   1. Lt knee cellulitis  2. Possible Lt knee septic arthritis  3. Chronic Rt gluteal wound  4. Spina bifida    Recommendations:   · Continue vancomycin for now  · Consider draining effusion for culture  · Wound care as ordered    Infection Control Recommendations   · [place] Precautions    Antimicrobial Stewardship Recommendations     · PK dosing    Coordination of Outpatient Care:   · Estimated Length of IV antimicrobials: TBD  · Patient will need Midline Catheter Insertion:   · Patient will need PICC line Insertion:  · Patient will need: Home IV , [place],  SNF,  LTAC No  · Patient will need outpatient wound care: Yes    Chief complaint/reason for consultation:   · Lt knee cellulitis      History of Present Illness:   Judd Almonte is a 32y.o.-year-old  male who was initially admitted on 6/29/2018. Patient seen at the request of Dr. Annie Ortega    Pt has a history of spina bifida and chronic Rt gluteal wound who presented to the ER on 6/30 with a 2 day history of Lt knee redness and swelling. His sensation is decreased at his baseline. On exam he was noted to have edema, erythema and warmth extending to his upper thigh. His CRP was >200, Sed rate 5, WBC 14.1. Pt denies any chills or fevers. He has been diagnosed with cellulitis, and some concern for septic arthritis. He was started on Vancomycin and admitted for further care. CURRENT EXAM 7/3/2018  Pt is AAO  Reports that his knee is significantly improved today  The edema and redness decreased    Tmax 100.2  VSS    WBC 14.1->9.0  Cr 0.80->0.81    Lt knee with effusion, warmth  Rt gluteal wound clean              Cultures:  Blood:  · 6/29 x 2 no growth to date      Discussed with patient, RN, family.     I have personally reviewed the past medical history, past surgical history, medications, social history, and family history, and I have updated the database accordingly. Past Medical History:     Past Medical History:   Diagnosis Date    Chronic headaches     Complex partial seizures with consciousness impaired ([address]     Congenital hydrocephalus ([address]     Dysplasia of hip     Esotropia, unspecified     corrective surgery 2000    Myelomeningocele ([address]     L4 functional level    Neurogenic bladder     Prepatellar bursitis of left knee 7/1/2018    S/P  shunt     Scoliosis     Seizures ([address]     Tethered cord ([address]     Release 1993 and 1996       Past Surgical  History:     Past Surgical History:   Procedure Laterality Date    CYST INCISION AND DRAINAGE  01/10/2017    unroofing of chronic sinus tract right buttocks.     CYSTOSCOPY  5/29/15    EYE SURGERY      OTHER SURGICAL HISTORY Right 01/10/2017    unroofing buttock wound right side     SKIN BIOPSY Right 10/2/2017    EXCISION DEBRIDEMENT BUTTOCK WOUND WITH APPLICATION OF WOUND VAC performed by Romain Holland MD at [address]      T7-L4    VENTRICULOPERITONEAL SHUNT      last revised 1999       Medications:      vancomycin  1,000 mg Intravenous Q8H    sodium chloride flush  10 mL Intravenous 2 times per day    enoxaparin  40 mg Subcutaneous Daily    docusate sodium  100 mg Oral BID    metoprolol tartrate  25 mg Oral BID    vancomycin (VANCOCIN) intermittent dosing (placeholder)   Other RX Placeholder       Social History:     Social History     Social History    Marital status: Single     Spouse name: N/A    Number of children: N/A    Years of education: N/A     Occupational History     [employer]     Social History Main Topics    Smoking status: Never Smoker    Smokeless tobacco: Never Used    Alcohol use No    Drug use: No    Sexual activity: No     Other Topics Concern    Not on file     Social History Narrative    No narrative on file       Family History:     Family History   Problem Relation Age of Onset    Heart Disease Father     Heart Surgery Father     Hypertension Father     Heart Disease Paternal Grandfather     Heart Failure Paternal Grandfather     Osteoarthritis Mother     Liver Disease Maternal Grandmother     Heart Attack Maternal Grandfather         Allergies:   Latex; Ceclor [cefaclor]; Seasonal; Other; and Peanuts [peanut oil]     Review of Systems:   Constitutional: No fevers or chills. No systemic complaints  Head: No headaches  Eyes: No double vision or blurry vision. No conjunctival inflammation. ENT: No sore throat or runny nose. . No hearing loss, tinnitus or vertigo. Cardiovascular: No chest pain or palpitations. No shortness of breath. No HUERTA  Lung: No shortness of breath or cough. No sputum production  Abdomen: No nausea, vomiting, diarrhea, or abdominal pain. [place] No cramps. Genitourinary: No increased urinary frequency, or dysuria. No hematuria. No suprapubic or CVA pain  Musculoskeletal: No muscle aches or pains. No joint effusions, swelling or deformities  Hematologic: No bleeding or bruising. Neurologic: No headache, weakness, numbness, or tingling. Integument: No rash. Psychiatric: No depression. Endocrine: No polyuria, no polydipsia, no polyphagia. Physical Examination :     Patient Vitals for the past 8 hrs:   BP Temp Temp src Pulse Resp SpO2   07/03/18 1146 114/77 97.9 °F (36.6 °C) Oral 96 16 99 %     General Appearance: Awake, alert, and in no apparent distress  Head:  Normocephalic, no trauma  Eyes: Pupils equal, round, reactive to light and accommodation; extraocular movements intact; sclera anicteric; conjunctivae pink. No embolic phenomena. ENT: Oropharynx clear, without erythema, exudate, or thrush. No tenderness of sinuses. Mouth/throat: mucosa pink and moist. No lesions. Dentition in good repair. Neck:Supple, without lymphadenopathy. Thyroid normal, No bruits. Pulmonary/Chest: Clear to auscultation, without wheezes, rales, or rhonchi.     Cardiovascular: Regular rate and

## 2018-07-04 LAB
CULTURE: ABNORMAL
DIRECT EXAM: ABNORMAL
Lab: ABNORMAL
ORGANISM: ABNORMAL
SPECIMEN DESCRIPTION: ABNORMAL
STATUS: ABNORMAL

## 2018-07-05 ENCOUNTER — TELEPHONE (OUTPATIENT)
Dept: INFECTIOUS DISEASES | Age: 28
End: 2018-07-05

## 2018-07-05 ENCOUNTER — CARE COORDINATION (OUTPATIENT)
Dept: CASE MANAGEMENT | Age: 28
End: 2018-07-05

## 2018-07-05 DIAGNOSIS — M70.42 PREPATELLAR BURSITIS OF LEFT KNEE: Primary | ICD-10-CM

## 2018-07-05 PROCEDURE — 1111F DSCHRG MED/CURRENT MED MERGE: CPT

## 2018-07-05 NOTE — DISCHARGE SUMMARY
osseous lesion. No evidence of joint effusion. No focal soft tissue abnormality. No acute abnormality of the knee. Ct Knee Left W Contrast    Result Date: 7/2/2018  EXAMINATION: CT OF THE LEFT KNEE WITH CONTRAST 7/2/2018 7:04 pm TECHNIQUE: CT of the left knee was performed with the administration of intravenous contrast.  Multiplanar reformatted images are provided for review. Dose modulation, iterative reconstruction, and/or weight based adjustment of the mA/kV was utilized to reduce the radiation dose to as low as reasonably achievable. COMPARISON: Radiograph June 29, 2018 HISTORY ORDERING SYSTEM PROVIDED HISTORY: EFFUSION, KNEE TECHNOLOGIST PROVIDED HISTORY: Ordering Physician Provided Reason for Exam: effusion FINDINGS: Bones: Normal Soft Tissue: There is a prepatellar effusion but no suprapatellar effusion. There is moderate soft tissue swelling and subcutaneous stranding anteriorly. The patella is intact. Joint:  Normal     Prepatellar bursitis. No suprapatellar effusion. Consultations:    Consults:     Final Specialist Recommendations/Findings:   PHARMACY TO DOSE VANCOMYCIN  IP CONSULT TO INTERNAL MEDICINE  IP CONSULT TO INFECTIOUS DISEASES  IP CONSULT TO ORTHOPEDIC SURGERY  PHARMACY TO DOSE VANCOMYCIN  PHARMACY TO DOSE VANCOMYCIN      The patient was seen and examined on day of discharge and this discharge summary is in conjunction with any daily progress note from day of discharge. Discharge plan:     Disposition: Home with [facility]    Physician Follow Up:      SWATI Oconnell - [address]  [phone]    Schedule an appointment as soon as possible for a visit  As needed    Ammy Treadwell MD  [address] [facility]  [address] [facility]  [address] [id number] [phone]    Schedule an appointment as soon as possible for a visit  follow up this week     David Vizcaino MD  [address] [address] [place]   [address] [facility] [address] [phone]    Schedule an appointment as

## 2018-07-05 NOTE — CARE COORDINATION
[facility] Transitions Initial Follow Up Call    Call within 2 business days of discharge: Yes    Patient: Kimberly Carrington Patient : 1990   MRN: [id number]  Reason for Admission: There are no discharge diagnoses documented for the most recent discharge.   Discharge Date: 7/3/18 RARS: Readmission Risk Score: 8     Spoke with: [address]: [facility]    Non-face-to-face services provided:  Obtained and reviewed discharge summary and/or continuity of care documents  Assessment and support for treatment adherence and medication management-reviewed discharge medications and instructions, 1111F order dropped   Writer spoke with Claire Washburn, patient doing well, no needs or concerns, OL already been out to see patient, reviewed discharge medications and instructions, 1111F order completed, mother already scheduled all patient's follow up appointments, contact information provided, care transitions completed//JU    Care Transitions 24 Hour Call    Schedule Follow Up Appointment with PCP:  Completed  Do you have any ongoing symptoms?:  No  Do you have a copy of your discharge instructions?:  Yes  Do you have all of your prescriptions and are they filled?:  Yes  Have you been contacted by a [facility] Pharmacist?:  No  Have you scheduled your follow up appointment?:  Yes  How are you going to get to your appointment?:  Car - family or friend to transport  Were you discharged with any Home Care or Post Acute Services:  Yes  Post Acute Services:  Home Health (Comment: [facility])  Do you feel like you have everything you need to keep you well at home?:  Yes  Care Transitions Interventions     Other Services:  (Comment: [facility])          Follow Up  Future Appointments  Date Time Provider Joaquin Woodard   2018 2:00 PM DO KARO Louis WND [facility]   2018 10:30 AM Maddie Grief, APRN - CNP [facility]   2018 10:00 AM MD KONSTANTIN Murillo Orth[facility]   2018 9:00 AM Lissa Corado MD INF Mary Elizondo RN

## 2018-07-05 NOTE — TELEPHONE ENCOUNTER
Per summary of care patient to see Dr Abbey Genao in one week. Genoveva please call patient to schedule. Thank you.

## 2018-07-06 ENCOUNTER — HOSPITAL ENCOUNTER (OUTPATIENT)
Dept: WOUND CARE | Age: 28
Discharge: HOME | End: 2018-07-06
Payer: COMMERCIAL

## 2018-07-06 LAB
CULTURE: NORMAL
CULTURE: NORMAL
Lab: NORMAL
Lab: NORMAL
SPECIMEN DESCRIPTION: NORMAL
SPECIMEN DESCRIPTION: NORMAL
STATUS: NORMAL
STATUS: NORMAL

## 2018-07-09 ENCOUNTER — OFFICE VISIT (OUTPATIENT)
Dept: FAMILY MEDICINE CLINIC | Age: 28
End: 2018-07-09
Payer: COMMERCIAL

## 2018-07-09 VITALS
SYSTOLIC BLOOD PRESSURE: 124 MMHG | RESPIRATION RATE: 20 BRPM | DIASTOLIC BLOOD PRESSURE: 72 MMHG | HEART RATE: 99 BPM | TEMPERATURE: 97.9 F | OXYGEN SATURATION: 98 %

## 2018-07-09 DIAGNOSIS — G40.209 COMPLEX PARTIAL SEIZURES WITH CONSCIOUSNESS IMPAIRED (HCC): ICD-10-CM

## 2018-07-09 DIAGNOSIS — M70.52 BURSITIS OF LEFT KNEE, UNSPECIFIED BURSA: Primary | ICD-10-CM

## 2018-07-09 PROCEDURE — 99496 TRANSJ CARE MGMT HIGH F2F 7D: CPT | Performed by: NURSE PRACTITIONER

## 2018-07-09 PROCEDURE — 1111F DSCHRG MED/CURRENT MED MERGE: CPT | Performed by: NURSE PRACTITIONER

## 2018-07-09 RX ORDER — LEVOFLOXACIN 500 MG/1
500 TABLET, FILM COATED ORAL DAILY
Qty: 10 TABLET | Refills: 0 | Status: SHIPPED | OUTPATIENT
Start: 2018-07-09 | End: 2018-07-16 | Stop reason: SINTOL

## 2018-07-11 ENCOUNTER — HOSPITAL ENCOUNTER (OUTPATIENT)
Dept: WOUND CARE | Age: 28
Discharge: HOME | End: 2018-07-11
Payer: COMMERCIAL

## 2018-07-11 ENCOUNTER — OFFICE VISIT (OUTPATIENT)
Dept: ORTHOPEDIC SURGERY | Age: 28
End: 2018-07-11

## 2018-07-11 VITALS
TEMPERATURE: 97.9 F | DIASTOLIC BLOOD PRESSURE: 76 MMHG | HEIGHT: 61 IN | WEIGHT: 156 LBS | HEART RATE: 109 BPM | BODY MASS INDEX: 29.45 KG/M2 | SYSTOLIC BLOOD PRESSURE: 146 MMHG | RESPIRATION RATE: 20 BRPM

## 2018-07-11 VITALS — HEIGHT: 61 IN | BODY MASS INDEX: 29.27 KG/M2 | WEIGHT: 155 LBS

## 2018-07-11 DIAGNOSIS — M70.42 PREPATELLAR BURSITIS OF LEFT KNEE: Primary | ICD-10-CM

## 2018-07-11 PROCEDURE — 6370000000 HC RX 637 (ALT 250 FOR IP): Performed by: NURSE PRACTITIONER

## 2018-07-11 PROCEDURE — 15271 SKIN SUB GRAFT TRNK/ARM/LEG: CPT | Performed by: NURSE PRACTITIONER

## 2018-07-11 PROCEDURE — 15271 SKIN SUB GRAFT TRNK/ARM/LEG: CPT

## 2018-07-11 PROCEDURE — 99024 POSTOP FOLLOW-UP VISIT: CPT | Performed by: ORTHOPAEDIC SURGERY

## 2018-07-11 RX ORDER — LIDOCAINE HYDROCHLORIDE 40 MG/ML
SOLUTION TOPICAL ONCE
Status: COMPLETED | OUTPATIENT
Start: 2018-07-11 | End: 2018-07-11

## 2018-07-11 RX ADMIN — LIDOCAINE HYDROCHLORIDE 5 ML: 40 SOLUTION TOPICAL at 09:02

## 2018-07-11 ASSESSMENT — PAIN SCALES - GENERAL: PAINLEVEL_OUTOF10: 0

## 2018-07-11 NOTE — PROGRESS NOTES
Katy Tellez M.D.            [address] [facility],[address], [address] [facility]             Dept Phone: [phone]             Dept Fax:  [phone]  Judd is a 49-year-old patient seen for evaluation for left prepatellar bursitis. Patient was seen by my partner on July 3 the hospital for prepatellar bursitis. He attempted aspiration at time very little out. Patient has recently been on Zyvox is about to be switched over to Levaquin. Mom states that the area significantly down a decrease in size. Patient does get around in a wheelchair. Examination notes that he still is little bit or redness and swelling to the prepatellar area but according to mom this is about 1/5 of the size it was before. I can motion his knee with basically no discomfort. Mom states he's had no fever chills    Impression  Resolving prepatellar bursitis left knee    Plan  Patient remain on antibiotics per her family physician. I would have him come back here 1 more week to have a partner follow-up for continued resolution          Review of Systems   Constitutional: Negative. HENT: Negative. Respiratory: Negative. Cardiovascular: Negative. Musculoskeletal: Negative. Neurological: Negative. Past Medical History:   Diagnosis Date    Chronic headaches     Complex partial seizures with consciousness impaired ([address]     Congenital hydrocephalus (HCC)     Dysplasia of hip     Esotropia, unspecified     corrective surgery 2000    Myelomeningocele ([address]     L4 functional level    Neurogenic bladder     Prepatellar bursitis of left knee 7/1/2018    S/P  shunt     Scoliosis     Seizures ([address]     Tethered cord ([address]     Release 1993 and 1996     Past Surgical History:   Procedure Laterality Date    CYST INCISION AND DRAINAGE  01/10/2017    unroofing of chronic sinus tract right buttocks.     CYSTOSCOPY  5/29/15    EYE SURGERY      OTHER SURGICAL HISTORY Right 01/10/2017    unroofing buttock wound right side     SKIN BIOPSY Right 10/2/2017    EXCISION DEBRIDEMENT BUTTOCK WOUND WITH APPLICATION OF WOUND VAC performed by Diana Mathis MD at [address] [place] [address] Se      last revised 1999     Family History   Problem Relation Age of Onset    Heart Disease Father     Heart Surgery Father     Hypertension Father     Heart Disease Paternal Grandfather     Heart Failure Paternal Grandfather     Osteoarthritis Mother     Liver Disease Maternal Grandmother     Heart Attack Maternal Grandfather          No orders of the defined types were placed in this encounter. 1. Prepatellar bursitis of left knee        Adelina Rizo MD    Please note that this chart was generated using voice recognition Dragon dictation software. Although every effort was made to ensure the accuracy of this automated transcription, some errors in transcription may have occurred.

## 2018-07-11 NOTE — PROGRESS NOTES
Heart Failure Paternal Grandfather     Osteoarthritis Mother     Liver Disease Maternal Grandmother     Heart Attack Maternal Grandfather        SOCIAL HISTORY    Social History   Substance Use Topics    Smoking status: Never Smoker    Smokeless tobacco: Never Used    Alcohol use No       ALLERGIES    Allergies   Allergen Reactions    Latex Swelling    Ceclor [Cefaclor] Hives    Seasonal Itching    Other      banannas    Peanuts [Peanut Oil] Hives       MEDICATIONS    Current Outpatient Prescriptions on File Prior to Encounter   Medication Sig Dispense Refill    levofloxacin (LEVAQUIN) 500 MG tablet Take 1 tablet by mouth daily for 10 days 10 tablet 0    linezolid (ZYVOX) 600 MG tablet Take 1 tablet by mouth 2 times daily for 10 days 20 tablet 0    EPINEPHrine (EPIPEN 2-ERIKA) 0.3 MG/0.3ML SOAJ injection Inject as needed 2 each 1    Handicap Placard MISC by Does not apply route Myelomeningocele (HCC)  (primary encounter diagnosis)  Congenital hydrocephalus (HCC)  Multiple allergies    Duration:  5 years 1 each 0    loratadine (CLARITIN) 10 MG tablet Take 1 tablet by mouth daily 30 tablet 11    Gauze Pads & Dressings (GAUZE DRESSING) 4\"X4\" PADS 1 each by Does not apply route daily 30 each 5    docusate sodium (COLACE) 100 MG capsule Take 1 capsule by mouth 2 times daily 30 capsule 2    Gauze Pads & Dressings (NU GAUZE PACKING STRIPS) MISC 1/4 inch plain packing gauze wet to dry to right buttock wound once daily cover with dry dressing 1 each 1    Multiple Vitamins-Minerals (THERAPEUTIC MULTIVITAMIN-MINERALS) tablet Take 1 tablet by mouth daily      Riboflavin (VITAMIN B-2 PO) Take 1 tablet by mouth       No current facility-administered medications on file prior to encounter. REVIEW OF SYSTEMS    Pertinent items are noted in HPI.     Objective:     Patient Active Problem List   Diagnosis Code    Myelomeningocele ([facility] Q05.9    Congenital hydrocephalus ([address] Q03.9    S/P  shunt Z98.2    Documentation Flow Sheet    Wound/Ulcer #: 4    Post Debridement Measurements:  Wound/Ulcer Descriptions are Pre Debridement except measurements:    Negative Pressure Wound Therapy Buttocks Right (Active)   Number of days: 282       Wound 12/05/16 #4 right buttock (Active)   Wound Image   6/6/2018  8:39 AM   Wound Type Wound 7/11/2018  9:01 AM   Dressing Status Old drainage 7/11/2018  9:01 AM   Dressing Changed Changed/New 7/11/2018  9:01 AM   Dressing/Treatment Dry dressing;Silver dressing 7/3/2018 10:00 AM   Wound Cleansed Rinsed/Irrigated with saline 7/11/2018  9:01 AM   Dressing Change Due 07/02/18 7/1/2018  9:00 AM   Wound Length (cm) 0.3 cm 7/11/2018  9:01 AM   Wound Width (cm) 2.5 cm 7/11/2018  9:01 AM   Wound Depth (cm)  2.41 7/11/2018  9:01 AM   Calculated Wound Size (cm^2) (l*w) 0.75 cm^2 7/11/2018  9:01 AM   Change in Wound Size % (l*w) -368.75 7/11/2018  9:01 AM   Distance Tunneling (cm) 9 cm 7/11/2017 10:03 AM   Tunneling Position ___ O'Clock 500 7/11/2017 10:03 AM   Undermining Starts ___ O'Clock 5 12/12/2017  9:42 AM   Undermining Ends___ O'Clock 7 12/12/2017  9:42 AM   Undermining Maxium Distance (cm) [email or web address] 12/12/2017  9:42 AM   Wound Assessment Drainage;Red;Yellow;Fibrin 7/11/2018  9:01 AM   Drainage Amount Moderate 7/11/2018  9:01 AM   Drainage Description Serosanguinous 7/11/2018  9:01 AM   Odor None 7/11/2018  9:01 AM   Margins Defined edges 7/11/2018  9:01 AM   Alayna-wound Assessment Calloused 7/11/2018  9:01 AM   [place]%Wound Bed 0 6/20/2018 10:08 AM   Red%Wound Bed 90 7/11/2018  9:01 AM   Yellow%Wound Bed 10 7/11/2018  9:01 AM   Black%Wound Bed 0 4/18/2018  9:55 AM   Purple%Wound Bed 0 5/18/2018  1:55 PM   Other%Wound Bed 0 2/28/2018  9:45 AM   Culture Taken Yes 7/11/2017 10:03 AM   Debridement per physician Muscle 1/24/2018 11:26 AM   Time out Yes 1/24/2018 11:26 AM   Procedural Pain 0 1/24/2018 11:26 AM   Post procedural Pain 0 1/24/2018 11:26 AM   Number of days: 582       Percent of at a time     Your next appointment with [facility] is in 1 week with [address]        TIME [] 45 MIN     [] 60 MIN     (Please note your next appointment above and if you are unable to keep, kindly give a 24 hour notice.  Thank you.)     If you experience any of the following, please call the [facility] during business hours:  [phone]     * Increase in Pain  * Temperature over 101  * Increase in drainage from your wound  * Drainage with a foul odor  * Bleeding  * Increase in swelling  * Need for compression bandage changes due to slippage, breakthrough drainage.     If you need medical attention outside of the business hours of the [facility] please contact your PCP or go to the nearest emergency room.      AVS Reviewed  [x] YES     Patient Signature:__________________________________________________Date:____________  Electronically signed by Han Garcia RN on 7/11/2018 at 9:29 AM   Electronically signed by SWATI Darby CNP on 7/11/2018 at 9:29 AM          Electronically signed by SWATI Darby CNP on 7/11/2018 at 9:32 AM

## 2018-07-11 NOTE — PROGRESS NOTES
wound. Date of first application of EpiCord for this current wound is May 30, 2018.         Electronically signed by Glyn Castleman, RN on 7/11/2018 at 9:39 AM

## 2018-07-16 ENCOUNTER — TELEPHONE (OUTPATIENT)
Dept: FAMILY MEDICINE CLINIC | Age: 28
End: 2018-07-16

## 2018-07-16 RX ORDER — TETRACYCLINE HYDROCHLORIDE 500 MG/1
500 CAPSULE ORAL 2 TIMES DAILY
Qty: 20 CAPSULE | Refills: 0 | Status: SHIPPED | OUTPATIENT
Start: 2018-07-16 | End: 2018-07-31 | Stop reason: ALTCHOICE

## 2018-07-16 NOTE — TELEPHONE ENCOUNTER
Patient was started on Levaquin and since starting that he has had elevated heart rate and was told by the Home health agency to stop taking it until mom calls office to see if there is something else to be called in. I asked Geeta if he was ever on this before and she states that he has never been on it in the past. Please advise.     RX: [facility]

## 2018-07-16 NOTE — TELEPHONE ENCOUNTER
Called and left message for mom and let her know to stop the levaquin and a new script was called in for Judd.

## 2018-07-18 ENCOUNTER — OFFICE VISIT (OUTPATIENT)
Dept: INFECTIOUS DISEASES | Age: 28
End: 2018-07-18
Payer: COMMERCIAL

## 2018-07-18 ENCOUNTER — HOSPITAL ENCOUNTER (OUTPATIENT)
Dept: WOUND CARE | Age: 28
Discharge: HOME | End: 2018-07-18
Payer: COMMERCIAL

## 2018-07-18 VITALS
BODY MASS INDEX: 28.32 KG/M2 | WEIGHT: 150 LBS | HEIGHT: 61 IN | SYSTOLIC BLOOD PRESSURE: 141 MMHG | HEART RATE: 84 BPM | RESPIRATION RATE: 18 BRPM | DIASTOLIC BLOOD PRESSURE: 74 MMHG | TEMPERATURE: 97.9 F

## 2018-07-18 VITALS
DIASTOLIC BLOOD PRESSURE: 81 MMHG | HEART RATE: 89 BPM | SYSTOLIC BLOOD PRESSURE: 132 MMHG | BODY MASS INDEX: 28.32 KG/M2 | WEIGHT: 150 LBS | TEMPERATURE: 98.1 F | HEIGHT: 61 IN

## 2018-07-18 DIAGNOSIS — M70.42 PREPATELLAR BURSITIS OF LEFT KNEE: Primary | ICD-10-CM

## 2018-07-18 PROCEDURE — 1111F DSCHRG MED/CURRENT MED MERGE: CPT | Performed by: INTERNAL MEDICINE

## 2018-07-18 PROCEDURE — G8417 CALC BMI ABV UP PARAM F/U: HCPCS | Performed by: INTERNAL MEDICINE

## 2018-07-18 PROCEDURE — G8427 DOCREV CUR MEDS BY ELIG CLIN: HCPCS | Performed by: INTERNAL MEDICINE

## 2018-07-18 PROCEDURE — 15271 SKIN SUB GRAFT TRNK/ARM/LEG: CPT | Performed by: SURGERY

## 2018-07-18 PROCEDURE — 1036F TOBACCO NON-USER: CPT | Performed by: INTERNAL MEDICINE

## 2018-07-18 PROCEDURE — 99215 OFFICE O/P EST HI 40 MIN: CPT | Performed by: INTERNAL MEDICINE

## 2018-07-18 PROCEDURE — 6370000000 HC RX 637 (ALT 250 FOR IP): Performed by: SURGERY

## 2018-07-18 PROCEDURE — 15272 SKIN SUB GRAFT T/A/L ADD-ON: CPT

## 2018-07-18 RX ORDER — LIDOCAINE HYDROCHLORIDE 40 MG/ML
SOLUTION TOPICAL ONCE
Status: COMPLETED | OUTPATIENT
Start: 2018-07-18 | End: 2018-07-18

## 2018-07-18 RX ORDER — TETRACYCLINE HYDROCHLORIDE 500 MG/1
500 CAPSULE ORAL 2 TIMES DAILY
Qty: 42 CAPSULE | Refills: 0 | Status: SHIPPED | OUTPATIENT
Start: 2018-07-18 | End: 2018-07-31 | Stop reason: ALTCHOICE

## 2018-07-18 RX ADMIN — LIDOCAINE HYDROCHLORIDE 5 ML: 40 SOLUTION TOPICAL at 10:35

## 2018-07-18 ASSESSMENT — ENCOUNTER SYMPTOMS
BLOOD IN STOOL: 0
COLOR CHANGE: 0
SORE THROAT: 0
DIARRHEA: 0
SINUS PRESSURE: 0
NAUSEA: 0
EYE ITCHING: 0
EYE PAIN: 0
CONSTIPATION: 0
COUGH: 0
WHEEZING: 0
ABDOMINAL DISTENTION: 0
CHEST TIGHTNESS: 0
SHORTNESS OF BREATH: 0
ABDOMINAL PAIN: 0

## 2018-07-18 ASSESSMENT — PAIN SCALES - GENERAL: PAINLEVEL_OUTOF10: 0

## 2018-07-18 NOTE — PROGRESS NOTES
Infectious disease Consult Note      Patient: Nuha Herndon  : 1990  Acct#:  [de-identified]     Date:  2018    Subjective:       History of Present Illness  Patient is a 32 y.o.  male   The patient was admitted 2018  with left anterior knee redness and swelling. Knee aspiration was done and the fluid grew staph aureus MSSA . Ct left knee suggestive of Prepatellar bursitis. The patient denied any recent trauma. He does have chronic sacral wound, follows at wound care clinic. Today:  He still have swelling and mild redness to the anterior left knee. He denied pain, had decrease sensation to the leg due to spina bifida. He denied fever or chills, nausea vomiting or diarrhea. He does have a neurogenic Bladder, straight cath as needed . Past Medical History:   Diagnosis Date    Chronic headaches     Complex partial seizures with consciousness impaired ([address]     Congenital hydrocephalus (HCC)     Dysplasia of hip     Esotropia, unspecified     corrective surgery     Myelomeningocele ([address]     L4 functional level    Neurogenic bladder     Prepatellar bursitis of left knee 2018    S/P  shunt     Scoliosis     Seizures ([address]     Tethered cord ([address]     Release  and       Past Surgical History:   Procedure Laterality Date    CYST INCISION AND DRAINAGE  01/10/2017    unroofing of chronic sinus tract right buttocks.     CYSTOSCOPY  5/29/15    EYE SURGERY      OTHER SURGICAL HISTORY Right 01/10/2017    unroofing buttock wound right side     SKIN BIOPSY Right 10/2/2017    EXCISION DEBRIDEMENT BUTTOCK WOUND WITH APPLICATION OF WOUND VAC performed by Diana Mathis MD at [address] [place] [address]      last revised           Admission Meds  Current Outpatient Prescriptions on File Prior to Visit   Medication Sig Dispense Refill    tetracycline (ACHROMYCIN;SUMYCIN) 500 MG capsule Take 1 capsule

## 2018-07-18 NOTE — PROGRESS NOTES
right buttock (Active)   Wound Image   6/6/2018  8:39 AM   Wound Type Wound 7/18/2018 10:33 AM   Dressing Status Old drainage 7/18/2018 10:33 AM   Dressing Changed Changed/New 7/18/2018 10:33 AM   Dressing/Treatment Dry dressing;Silver dressing 7/3/2018 10:00 AM   Wound Cleansed Rinsed/Irrigated with saline 7/18/2018 10:33 AM   Dressing Change Due 07/02/18 7/1/2018  9:00 AM   Wound Length (cm) 2cm 7/18/2018 10:33 AM   Wound Width (cm) 2.5 cm 7/18/2018 10:33 AM   Wound Depth (cm)  3.5 7/18/2018 10:33 AM   Calculated Wound Size (cm^2) (l*w) 5 cm^2 7/18/2018 10:33 AM   Change in Wound Size % (l*w) -425 7/18/2018 10:33 AM   Distance Tunneling (cm) 9 cm 7/11/2017 10:03 AM   Tunneling Position ___ O'Clock 500 7/11/2017 10:03 AM   Undermining Starts ___ O'Clock 5 12/12/2017  9:42 AM   Undermining Ends___ O'Clock 7 12/12/2017  9:42 AM   Undermining Maxium Distance (cm) [email or web address] 12/12/2017  9:42 AM   Wound Assessment Drainage; Red 7/18/2018 10:33 AM   Drainage Amount Moderate 7/18/2018 10:33 AM   Drainage Description Serosanguinous 7/18/2018 10:33 AM   Odor None 7/18/2018 10:33 AM   Margins Defined edges 7/18/2018 10:33 AM   Alayna-wound Assessment Calloused 7/18/2018 10:33 AM   [place]%Wound Bed 0 6/20/2018 10:08 AM   Red%Wound Bed 100 7/18/2018 10:33 AM   Yellow%Wound Bed 0 7/18/2018 10:33 AM   Black%Wound Bed 0 4/18/2018  9:55 AM   Purple%Wound Bed 0 5/18/2018  1:55 PM   Other%Wound Bed 0 2/28/2018  9:45 AM   Culture Taken Yes 7/11/2017 10:03 AM   Debridement per physician Muscle 1/24/2018 11:26 AM   Time out Yes 1/24/2018 11:26 AM   Procedural Pain 0 1/24/2018 11:26 AM   Post procedural Pain 0 1/24/2018 11:26 AM   Number of days: 589       Percent of Wound(s)/Ulcer(s) Debrided: 100%    Total Surface Area Debrided:  5 sq cm       Diabetic/Pressure/Non Pressure Ulcers only:  Ulcer: Pressure ulcer, Stage 3      Estimated Blood Loss:  Minimal    Hemostasis Achieved:  by silver nitrate stick    Procedural Pain:  0  / 10     Post

## 2018-07-18 NOTE — PROGRESS NOTES
tablet  Take 1 tablet by mouth daily             Multiple Vitamins-Minerals (THERAPEUTIC MULTIVITAMIN-MINERALS) tablet  Take 1 tablet by mouth daily             Riboflavin (VITAMIN B-2 PO)  Take 1 tablet by mouth             tetracycline (ACHROMYCIN;SUMYCIN) 500 MG capsule  Take 1 capsule by mouth 2 times daily             tetracycline (ACHROMYCIN;SUMYCIN) 500 MG capsule  Take 1 capsule by mouth 2 times daily for 21 days                   Medications marked \"taking\" at this time  Outpatient Prescriptions Marked as Taking for the 18 encounter (Office Visit) with SWATI Oconnell CNP   Medication Sig Dispense Refill    [DISCONTINUED] levofloxacin (LEVAQUIN) 500 MG tablet Take 1 tablet by mouth daily for 10 days 10 tablet 0    [] linezolid (ZYVOX) 600 MG tablet Take 1 tablet by mouth 2 times daily for 10 days 20 tablet 0    EPINEPHrine (EPIPEN 2-ERIKA) 0.3 MG/0.3ML SOAJ injection Inject as needed 2 each 1    Handicap Placard MISC by Does not apply route Myelomeningocele (HCC)  (primary encounter diagnosis)  Congenital hydrocephalus (HCC)  Multiple allergies    Duration:  5 years 1 each 0    loratadine (CLARITIN) 10 MG tablet Take 1 tablet by mouth daily 30 tablet 11    Gauze Pads & Dressings (GAUZE DRESSING) 4\"X4\" PADS 1 each by Does not apply route daily 30 each 5    docusate sodium (COLACE) 100 MG capsule Take 1 capsule by mouth 2 times daily 30 capsule 2    Gauze Pads & Dressings (NU GAUZE PACKING STRIPS) MISC 1/4 inch plain packing gauze wet to dry to right buttock wound once daily cover with dry dressing 1 each 1    Multiple Vitamins-Minerals (THERAPEUTIC MULTIVITAMIN-MINERALS) tablet Take 1 tablet by mouth daily      Riboflavin (VITAMIN B-2 PO) Take 1 tablet by mouth          Medications patient taking as of now reconciled against medications ordered at time of hospital discharge: Yes    Vitals:    18 1051   BP: 124/72   Site: Right Arm   Position: Sitting   Cuff Size: Medium Adult intolerance, heat intolerance, polydipsia, polyphagia and polyuria. Genitourinary: Negative for dysuria, flank pain, frequency and urgency. Musculoskeletal: Positive for arthralgias (left knee remains slightly swollen. Mother states that his left leg has always been bigger than his right leg) and gait problem (due to spina bifida). Negative for myalgias. Was born with spina bifida. Skin: Negative for color change, rash and wound. Allergic/Immunologic: Negative for environmental allergies and food allergies. Neurological: Positive for weakness (due to spina bifida). Negative for speech difficulty, light-headedness and headaches. Hematological: Does not bruise/bleed easily. Psychiatric/Behavioral: Negative for decreased concentration and sleep disturbance. The patient is not nervous/anxious. Non face to face  following discharge, date last encounter closed (first attempt may have been earlier): 7/5/2018  1:03 PM 7/5/2018  1:03 PM    Call initiated 2 business days of discharge: Yes     Interval history/Current status: Improving      Physical Exam   Constitutional: He is oriented to person, place, and time. Vital signs are normal. He appears well-developed and well-nourished. He is cooperative. HENT:   Head: Normocephalic. Right Ear: External ear normal.   Left Ear: External ear normal.   Nose: Nose normal.   Mouth/Throat: Oropharynx is clear and moist.   Was born with hydrocephalus. Has shunt in place. Eyes: Conjunctivae and EOM are normal.   Neck: Normal range of motion. Neck supple. No thyromegaly present. Cardiovascular: Normal rate, regular rhythm and normal heart sounds. Exam reveals no gallop and no friction rub. No murmur heard. Pulmonary/Chest: Effort normal and breath sounds normal. No respiratory distress. He has no wheezes. He has no rales. He exhibits no tenderness. Abdominal: Soft. Bowel sounds are normal. He exhibits no distension.  There is no splenomegaly or

## 2018-07-19 ENCOUNTER — OFFICE VISIT (OUTPATIENT)
Dept: ORTHOPEDIC SURGERY | Age: 28
End: 2018-07-19
Payer: COMMERCIAL

## 2018-07-19 DIAGNOSIS — L03.116 CELLULITIS OF LEFT LOWER EXTREMITY: ICD-10-CM

## 2018-07-19 DIAGNOSIS — M70.42 PREPATELLAR BURSITIS OF LEFT KNEE: Primary | ICD-10-CM

## 2018-07-19 PROCEDURE — 1111F DSCHRG MED/CURRENT MED MERGE: CPT | Performed by: ORTHOPAEDIC SURGERY

## 2018-07-19 PROCEDURE — 1036F TOBACCO NON-USER: CPT | Performed by: ORTHOPAEDIC SURGERY

## 2018-07-19 PROCEDURE — 99213 OFFICE O/P EST LOW 20 MIN: CPT | Performed by: ORTHOPAEDIC SURGERY

## 2018-07-19 PROCEDURE — G8427 DOCREV CUR MEDS BY ELIG CLIN: HCPCS | Performed by: ORTHOPAEDIC SURGERY

## 2018-07-19 PROCEDURE — G8417 CALC BMI ABV UP PARAM F/U: HCPCS | Performed by: ORTHOPAEDIC SURGERY

## 2018-07-19 NOTE — PROGRESS NOTES
Subjective:      Patient ID: Judd Trotter is a 32 y.o. male. HPI       Please refer to all my previous clinic notes    Patient is here for follow-up prepatellar bursitis    Erythema is diminished    Unfortunately continues to have a bit more of a fluid collection and I would like. Nevertheless his C-reactive protein is dropping    Review of Systems    Objective:   Physical Exam  Examination left knee reveals ongoing prepatellar fluid although the erythema appears substantially improved  Assessment:      Encounter Diagnoses   Name Primary?     Prepatellar bursitis of left knee Yes    Cellulitis of left lower extremity            Plan:      Follow-up Monday

## 2018-07-23 ENCOUNTER — OFFICE VISIT (OUTPATIENT)
Dept: ORTHOPEDIC SURGERY | Age: 28
End: 2018-07-23
Payer: COMMERCIAL

## 2018-07-23 DIAGNOSIS — L03.116 CELLULITIS OF LEFT LOWER EXTREMITY: ICD-10-CM

## 2018-07-23 DIAGNOSIS — M70.42 PREPATELLAR BURSITIS OF LEFT KNEE: Primary | ICD-10-CM

## 2018-07-23 PROCEDURE — 99213 OFFICE O/P EST LOW 20 MIN: CPT | Performed by: ORTHOPAEDIC SURGERY

## 2018-07-23 PROCEDURE — 1036F TOBACCO NON-USER: CPT | Performed by: ORTHOPAEDIC SURGERY

## 2018-07-23 PROCEDURE — 1111F DSCHRG MED/CURRENT MED MERGE: CPT | Performed by: ORTHOPAEDIC SURGERY

## 2018-07-23 PROCEDURE — G8427 DOCREV CUR MEDS BY ELIG CLIN: HCPCS | Performed by: ORTHOPAEDIC SURGERY

## 2018-07-23 PROCEDURE — G8417 CALC BMI ABV UP PARAM F/U: HCPCS | Performed by: ORTHOPAEDIC SURGERY

## 2018-07-23 NOTE — PROGRESS NOTES
Subjective:      Patient ID: Judd Almonte is a 32 y.o. male. HPI  Please refer to my previous clinic notes    Patient is here follow-up of his prepatellar bursitis      Review of Systems    Objective:   Physical Exam patient's prepatellar bursitis appears to be slowly getting better he appears slightly better than it did last visit there is no surrounding erythema there is still a very slight fluid collection   Assessment:      Encounter Diagnoses   Name Primary?     Prepatellar bursitis of left knee Yes    Cellulitis of left lower extremity            Plan:      And T Bactrim    Follow-up post visit to [place]

## 2018-07-25 ENCOUNTER — HOSPITAL ENCOUNTER (OUTPATIENT)
Dept: WOUND CARE | Age: 28
Discharge: HOME | End: 2018-07-25
Payer: COMMERCIAL

## 2018-07-25 VITALS
TEMPERATURE: 97.7 F | HEART RATE: 102 BPM | HEIGHT: 61 IN | DIASTOLIC BLOOD PRESSURE: 75 MMHG | BODY MASS INDEX: 28.32 KG/M2 | SYSTOLIC BLOOD PRESSURE: 126 MMHG | WEIGHT: 150 LBS | RESPIRATION RATE: 18 BRPM

## 2018-07-25 PROCEDURE — 6370000000 HC RX 637 (ALT 250 FOR IP): Performed by: SURGERY

## 2018-07-25 PROCEDURE — 15271 SKIN SUB GRAFT TRNK/ARM/LEG: CPT

## 2018-07-25 RX ORDER — LIDOCAINE HYDROCHLORIDE 40 MG/ML
SOLUTION TOPICAL ONCE
Status: COMPLETED | OUTPATIENT
Start: 2018-07-25 | End: 2018-07-25

## 2018-07-25 RX ADMIN — LIDOCAINE HYDROCHLORIDE 10 ML: 40 SOLUTION TOPICAL at 11:03

## 2018-07-25 ASSESSMENT — PAIN SCALES - GENERAL: PAINLEVEL_OUTOF10: 0

## 2018-07-25 NOTE — PROGRESS NOTES
[facility]   Progress Note and Procedure Note      Andreina Roland  MEDICAL RECORD NUMBER:  [id number]  AGE: 32 y.o. GENDER: male  : 1990  EPISODE DATE:  2018    Subjective:     Chief Complaint   Patient presents with    Wound Check     right hip         HISTORY of PRESENT ILLNESS HPI     Judd Ceballos is a 32 y.o. male who presents today for wound/ulcer evaluation. History of Wound Context: gluteal wound at site of pressure ulcer  Wound/Ulcer Pain Timing/Severity: none  Quality of pain: N/A  Severity:  0 / 10   Modifying Factors: None  Associated Signs/Symptoms: none     Wound improving with placement of 2x3 epicord. Ulcer Identification:  Ulcer Type: pressure  Contributing Factors: chronic pressure and decreased mobility    Wound: N/A        PAST MEDICAL HISTORY        Diagnosis Date    Chronic headaches     Complex partial seizures with consciousness impaired (HCC)     Congenital hydrocephalus (HCC)     Dysplasia of hip     Esotropia, unspecified     corrective surgery     Myelomeningocele ([facility]     L4 functional level    Neurogenic bladder     Prepatellar bursitis of left knee 2018    S/P  shunt     Scoliosis     Seizures (HCC)     Tethered cord ([address]     Release  and        PAST SURGICAL HISTORY    Past Surgical History:   Procedure Laterality Date    CYST INCISION AND DRAINAGE  01/10/2017    unroofing of chronic sinus tract right buttocks.     CYSTOSCOPY  5/29/15    EYE SURGERY      OTHER SURGICAL HISTORY Right 01/10/2017    unroofing buttock wound right side     SKIN BIOPSY Right 10/2/2017    EXCISION DEBRIDEMENT BUTTOCK WOUND WITH APPLICATION OF WOUND VAC performed by Alysha Urbano MD at [address]      T7-L4    VENTRICULOPERITONEAL SHUNT      last revised        FAMILY HISTORY    Family History   Problem Relation Age of Onset    Heart Disease Father     Heart Surgery Father     Hypertension Father     Heart advanced modalities to heal this ulceration. Therefore, I feel that a epicord is necessary. The ulceration is greater and 1 sq cm and has failed to improve over the past four weeks. This patient is a non-smoker and care has been taken to eradicate infection. Due to wound size, the procedure may need to be staged and multiple skin graft substitutes may need to be applied. The patient received his 6th application of 2x3 epicord following wound debridement. The graft was placed in the base of the wound, the entire graft was used and none discarded. The wound was covered with non-adherent silvercel and dry gauze dressing and secured with steri strips. The patient tolerated the procedure well. Plan:     Treatment Note please see attached Discharge Instructions    Written patient dismissal instructions given to patient and signed by patient or POA. Discharge Instructions                 [facility]                                                               Visit Jt Instructions / Physician Orders  7/25/18      Home Care:      SUPPLIES ORDERED THRU:      Wound Location:  Right buttock     Cleanse with: keep dressing intact except outer dressing     Dressing Orders: Epifcord #6 applied to wound, topped with silver alginate, dry gauze, and tegaderm, Try to keep in place as long as possible, then change outer dressing     Frequency: as needed     Additional Orders: Increase protein to diet (meat, cheese, eggs, fish, peanut butter, nuts and beans)  Multivitamin daily  up for meals only 1/2 hr at a time     Your next appointment with [facility] is Tuesday morning with [place] CNP  ROOM TYPE   [] CHAIR     [x] BED   [] EITHER        TIME [] 45 MIN     [] 60 MIN     (Please note your next appointment above and if you are unable to keep, kindly give a 24 hour notice.  Thank you.)     If you experience any of the following, please call the [address] [place] [address] [facility]

## 2018-07-31 ENCOUNTER — HOSPITAL ENCOUNTER (OUTPATIENT)
Dept: WOUND CARE | Age: 28
Discharge: HOME | End: 2018-07-31
Payer: COMMERCIAL

## 2018-07-31 VITALS
HEIGHT: 61 IN | WEIGHT: 150 LBS | RESPIRATION RATE: 18 BRPM | SYSTOLIC BLOOD PRESSURE: 140 MMHG | HEART RATE: 104 BPM | DIASTOLIC BLOOD PRESSURE: 77 MMHG | TEMPERATURE: 96.8 F | BODY MASS INDEX: 28.32 KG/M2

## 2018-07-31 PROCEDURE — 6370000000 HC RX 637 (ALT 250 FOR IP): Performed by: NURSE PRACTITIONER

## 2018-07-31 PROCEDURE — 11042 DBRDMT SUBQ TIS 1ST 20SQCM/<: CPT

## 2018-07-31 PROCEDURE — 15271 SKIN SUB GRAFT TRNK/ARM/LEG: CPT | Performed by: NURSE PRACTITIONER

## 2018-07-31 RX ORDER — LIDOCAINE HYDROCHLORIDE 40 MG/ML
SOLUTION TOPICAL ONCE
Status: COMPLETED | OUTPATIENT
Start: 2018-07-31 | End: 2018-07-31

## 2018-07-31 RX ADMIN — LIDOCAINE HYDROCHLORIDE 5 ML: 40 SOLUTION TOPICAL at 09:31

## 2018-07-31 ASSESSMENT — PAIN SCALES - GENERAL: PAINLEVEL_OUTOF10: 0

## 2018-07-31 NOTE — PROGRESS NOTES
[address]   Progress Note and Procedure Note      Dedra Horton  MEDICAL RECORD NUMBER:  [id number]  AGE: 32 y.o. GENDER: male  : 1990  EPISODE DATE:  2018    Subjective:     Chief Complaint   Patient presents with    Wound Check     right buttock         HISTORY of PRESENT ILLNESS HPI     Judd Escobar is a 32 y.o. male who presents today for wound/ulcer evaluation. History of Wound Context: right gluteal pressure ulcer, using epicord with #6 application completed last week. Wound/Ulcer Pain Timing/Severity: none  Quality of pain: N/A  Severity:  0 / 10   Modifying Factors: None  Associated Signs/Symptoms: drainage    Ulcer Identification:  Ulcer Type: pressure  Contributing Factors: chronic pressure and decreased mobility          PAST MEDICAL HISTORY        Diagnosis Date    Chronic headaches     Complex partial seizures with consciousness impaired (HCC)     Congenital hydrocephalus (HCC)     Dysplasia of hip     Esotropia, unspecified     corrective surgery     Myelomeningocele ([facility] [address] [facility]     L4 functional level    Neurogenic bladder     Prepatellar bursitis of left knee 2018    S/P  shunt     Scoliosis     Seizures (HCC)     Tethered cord ([facility] [address]     Release  and        PAST SURGICAL HISTORY    Past Surgical History:   Procedure Laterality Date    CYST INCISION AND DRAINAGE  01/10/2017    unroofing of chronic sinus tract right buttocks.     CYSTOSCOPY  5/29/15    EYE SURGERY      OTHER SURGICAL HISTORY Right 01/10/2017    unroofing buttock wound right side     SKIN BIOPSY Right 10/2/2017    EXCISION DEBRIDEMENT BUTTOCK WOUND WITH APPLICATION OF WOUND VAC performed by Melodie Joshi MD at [address]      T7-L4    VENTRICULOPERITONEAL SHUNT      last revised        FAMILY HISTORY    Family History   Problem Relation Age of Onset    Heart Disease Father     Heart Surgery Father     Hypertension Father     Heart Disease Paternal Grandfather     Heart Failure Paternal Grandfather     Osteoarthritis Mother     Liver Disease Maternal Grandmother     Heart Attack Maternal Grandfather        SOCIAL HISTORY    Social History   Substance Use Topics    Smoking status: Never Smoker    Smokeless tobacco: Never Used    Alcohol use No       ALLERGIES    Allergies   Allergen Reactions    Latex Swelling    Ceclor [Cefaclor] Hives    Seasonal Itching    Levaquin [Levofloxacin] Other (See Comments)     Tachycardia    Other      banannas    Peanuts [Peanut Oil] Hives       MEDICATIONS    Current Outpatient Prescriptions on File Prior to Encounter   Medication Sig Dispense Refill    EPINEPHrine (EPIPEN 2-ERIKA) 0.3 MG/0.3ML SOAJ injection Inject as needed 2 each 1    Handicap Placard MISC by Does not apply route Myelomeningocele ([facility]  (primary encounter diagnosis)  Congenital hydrocephalus (HCC)  Multiple allergies    Duration:  5 years 1 each 0    loratadine (CLARITIN) 10 MG tablet Take 1 tablet by mouth daily 30 tablet 11    Gauze Pads & Dressings (GAUZE DRESSING) 4\"X4\" PADS 1 each by Does not apply route daily 30 each 5    docusate sodium (COLACE) 100 MG capsule Take 1 capsule by mouth 2 times daily 30 capsule 2    Gauze Pads & Dressings (NU GAUZE PACKING STRIPS) MISC 1/4 inch plain packing gauze wet to dry to right buttock wound once daily cover with dry dressing 1 each 1    Multiple Vitamins-Minerals (THERAPEUTIC MULTIVITAMIN-MINERALS) tablet Take 1 tablet by mouth daily      Riboflavin (VITAMIN B-2 PO) Take 1 tablet by mouth       No current facility-administered medications on file prior to encounter. REVIEW OF SYSTEMS    Pertinent items are noted in HPI.     Objective:     Patient Active Problem List   Diagnosis Code    Myelomeningocele ([facility] Q05.9    Congenital hydrocephalus ([facility] Q03.9    S/P  shunt Z98.2    Scoliosis M41.9    Complex partial seizures with consciousness impaired (HCC) G40.209    Chronic headaches R51    Wound of right buttock S31.819A    Surgical wound, non healing T81.89XA    Decubitus ulcer of right buttock, stage 3 (HCC) L89.313    Cellulitis L03.90    Prepatellar bursitis of left knee M70.42        BP (!) 140/77   Pulse 104   Temp 96.8 °F (36 °C) (Tympanic)   Resp 18   Ht 5' 1\" (1.549 m)   Wt 150 lb (68 kg)   BMI 28.34 kg/m²       Wt Readings from Last 3 Encounters:   07/31/18 150 lb (68 kg)   07/25/18 150 lb (68 kg)   07/18/18 150 lb (68 kg)       PHYSICAL EXAM    General Appearance: alert and oriented to person, place and time, well-developed and well-nourished, in no acute distress  Skin: warm and dry, no rash or erythema, open wound  To right buttock, wound bed clean with granulation tissue      Assessment:      Patient Active Problem List   Diagnosis Code    Myelomeningocele ([facility] Q05.9    Congenital hydrocephalus ([facility] Q03.9    S/P  shunt Z98.2    Scoliosis M41.9    Complex partial seizures with consciousness impaired ([facility] G40.209    Chronic headaches R51    Wound of right buttock S31.819A    Surgical wound, non healing T81.89XA    Decubitus ulcer of right buttock, stage 3 (HCC) L89.313    Cellulitis L03.90    Prepatellar bursitis of left knee M70.42        Procedure Note  Indications:  Based on my examination of this patient's wound(s)/ulcer(s) today, debridement is required to promote healing and evaluate the wound base. Performed by: SWATI Mays CNP    Consent obtained:  Yes    Time out taken:  Yes    Pain Control: Anesthetic  Anesthetic: 4% Lidocaine Liquid Topical       Debridement:Excisional Debridement    Using curette the wound(s)/ulcer(s) was/were sharply debrided down through and including the removal of subcutaneous tissue.         Devitalized Tissue Debrided:  fibrin, biofilm and slough    Pre Debridement Measurements:  Are located in the [place]  Documentation Flow Sheet    Wound/Ulcer #: 4    Post Debridement Measurements:  Wound/Ulcer Descriptions are Pre Debridement except measurements:    Negative Pressure Wound Therapy Buttocks Right (Active)   Number of days: 302       Wound 12/05/16 #4 right buttock (Active)   Wound Image   6/6/2018  8:39 AM   Wound Type Wound 7/31/2018  9:16 AM   Dressing Status Old drainage 7/31/2018  9:16 AM   Dressing Changed Changed/New 7/31/2018  9:16 AM   Dressing/Treatment Dry dressing;Silver dressing 7/3/2018 10:00 AM   Wound Cleansed Rinsed/Irrigated with saline 7/31/2018  9:16 AM   Dressing Change Due 07/02/18 7/1/2018  9:00 AM   Wound Length (cm) 1.5 cm 7/31/2018  9:43 AM   Wound Width (cm) 3 cm 7/31/2018  9:43 AM   Wound Depth (cm)  2.3 7/31/2018  9:43 AM   Calculated Wound Size (cm^2) (l*w) 4.5 cm^2 7/31/2018  9:43 AM   Change in Wound Size % (l*w) -2712.5 7/31/2018  9:43 AM   Distance Tunneling (cm) 9 cm 7/11/2017 10:03 AM   Tunneling Position ___ O'Clock 500 7/11/2017 10:03 AM   Undermining Starts ___ O'Clock 5 12/12/2017  9:42 AM   Undermining Ends___ O'Clock 7 12/12/2017  9:42 AM   Undermining Maxium Distance (cm) [email or web address] 12/12/2017  9:42 AM   Wound Assessment Red 7/31/2018  9:16 AM   Drainage Amount Moderate 7/31/2018  9:16 AM   Drainage Description Serosanguinous 7/31/2018  9:16 AM   Odor None 7/31/2018  9:16 AM   Margins Defined edges 7/31/2018  9:16 AM   Alayna-wound Assessment Calloused; Maceration 7/31/2018  9:16 AM   [place]%Wound Bed 0 6/20/2018 10:08 AM   Red%Wound Bed 100 7/31/2018  9:16 AM   Yellow%Wound Bed 0 7/31/2018  9:16 AM   Black%Wound Bed 0 4/18/2018  9:55 AM   Purple%Wound Bed 0 5/18/2018  1:55 PM   Other%Wound Bed 0 2/28/2018  9:45 AM   Culture Taken Yes 7/11/2017 10:03 AM   Debridement per physician Muscle 1/24/2018 11:26 AM   Time out Yes 1/24/2018 11:26 AM   Procedural Pain 0 1/24/2018 11:26 AM   Post procedural Pain 0 1/24/2018 11:26 AM   Number of days: 603       Percent of Wound(s)/Ulcer(s) Debrided: 100%    Total Surface Area Debrided:  4.5 sq cm       Diabetic/Pressure/Non Pressure     [x] BED   [] EITHER        TIME [x] 45 MIN     [] 60 MIN     (Please note your next appointment above and if you are unable to keep, kindly give a 24 hour notice.  Thank you.)     If you experience any of the following, please call the [facility] during business hours:  [phone]     * Increase in Pain  * Temperature over 101  * Increase in drainage from your wound  * Drainage with a foul odor  * Bleeding  * Increase in swelling  * Need for compression bandage changes due to slippage, breakthrough drainage.     If you need medical attention outside of the business hours of the [facility] please contact your PCP or go to the nearest emergency room.      AVS Reviewed  [x] YES     Patient Signature:__________________________________________________Date:____________  Electronically signed by Jamia Reynaga RN on 7/31/2018 at 9:42 AM  Electronically signed by Tisha Cockayne, APRN - CNP on 7/31/2018 at 9:51 AM'        Electronically signed by Tisha Cockayne, APRN - CNP on 7/31/2018 at 12:43 PM

## 2018-07-31 NOTE — PLAN OF CARE
Problem: Wound:  Goal: Will show signs of wound healing; wound closure and no evidence of infection  Will show signs of wound healing; wound closure and no evidence of infection   Outcome: Ongoing      Problem: Pressure Ulcer:  Goal: Signs of wound healing will improve  Signs of wound healing will improve   Outcome: Ongoing    Goal: Absence of new pressure ulcer  Absence of new pressure ulcer   Outcome: Ongoing    Goal: Will show no infection signs and symptoms  Will show no infection signs and symptoms   Outcome: Ongoing      Problem: Falls - Risk of:  Goal: Will remain free from falls  Will remain free from falls    Outcome: Ongoing

## 2018-08-21 ENCOUNTER — OFFICE VISIT (OUTPATIENT)
Dept: ORTHOPEDIC SURGERY | Age: 28
End: 2018-08-21
Payer: COMMERCIAL

## 2018-08-21 ENCOUNTER — OFFICE VISIT (OUTPATIENT)
Dept: INFECTIOUS DISEASES | Age: 28
End: 2018-08-21
Payer: COMMERCIAL

## 2018-08-21 VITALS
HEART RATE: 84 BPM | RESPIRATION RATE: 22 BRPM | BODY MASS INDEX: 28.32 KG/M2 | HEIGHT: 61 IN | TEMPERATURE: 98 F | SYSTOLIC BLOOD PRESSURE: 122 MMHG | WEIGHT: 150 LBS | DIASTOLIC BLOOD PRESSURE: 74 MMHG

## 2018-08-21 DIAGNOSIS — M70.42 PREPATELLAR BURSITIS OF LEFT KNEE: ICD-10-CM

## 2018-08-21 DIAGNOSIS — M70.42 PREPATELLAR BURSITIS OF LEFT KNEE: Primary | ICD-10-CM

## 2018-08-21 DIAGNOSIS — L03.116 CELLULITIS OF LEFT LOWER EXTREMITY: ICD-10-CM

## 2018-08-21 PROCEDURE — G8427 DOCREV CUR MEDS BY ELIG CLIN: HCPCS | Performed by: INTERNAL MEDICINE

## 2018-08-21 PROCEDURE — G8417 CALC BMI ABV UP PARAM F/U: HCPCS | Performed by: ORTHOPAEDIC SURGERY

## 2018-08-21 PROCEDURE — 1036F TOBACCO NON-USER: CPT | Performed by: INTERNAL MEDICINE

## 2018-08-21 PROCEDURE — G8427 DOCREV CUR MEDS BY ELIG CLIN: HCPCS | Performed by: ORTHOPAEDIC SURGERY

## 2018-08-21 PROCEDURE — 1036F TOBACCO NON-USER: CPT | Performed by: ORTHOPAEDIC SURGERY

## 2018-08-21 PROCEDURE — G8417 CALC BMI ABV UP PARAM F/U: HCPCS | Performed by: INTERNAL MEDICINE

## 2018-08-21 PROCEDURE — 99213 OFFICE O/P EST LOW 20 MIN: CPT | Performed by: ORTHOPAEDIC SURGERY

## 2018-08-21 PROCEDURE — 99214 OFFICE O/P EST MOD 30 MIN: CPT | Performed by: INTERNAL MEDICINE

## 2018-08-21 NOTE — PROGRESS NOTES
Infectious disease Consult Note      Patient: Kan Negrete  : 1990  Acct#:  [de-identified]     Date:  2018    Subjective:       History of Present Illness  Patient is a 32 y.o.  male   The patient was admitted 2018  with left anterior knee redness and swelling. Knee aspiration was done and the fluid grew staph aureus MSSA . Ct left knee suggestive of Prepatellar bursitis. The patient denied any recent trauma. He does have chronic sacral wound, follows at wound care clinic. Today:  He is feeling better ,less swelling and redness to the anterior left knee. He denied pain, had decrease sensation to the leg due to spina bifida. He denied fever or chills, nausea vomiting or diarrhea. He does have a neurogenic Bladder, straight cath as needed . Past Medical History:   Diagnosis Date    Chronic headaches     Complex partial seizures with consciousness impaired ([address]     Congenital hydrocephalus (HCC)     Dysplasia of hip     Esotropia, unspecified     corrective surgery     Myelomeningocele ([address]     L4 functional level    Neurogenic bladder     Prepatellar bursitis of left knee 2018    S/P  shunt     Scoliosis     Seizures ([address]     Tethered cord ([address]     Release  and       Past Surgical History:   Procedure Laterality Date    CYST INCISION AND DRAINAGE  01/10/2017    unroofing of chronic sinus tract right buttocks.     CYSTOSCOPY  5/29/15    EYE SURGERY      OTHER SURGICAL HISTORY Right 01/10/2017    unroofing buttock wound right side     SKIN BIOPSY Right 10/2/2017    EXCISION DEBRIDEMENT BUTTOCK WOUND WITH APPLICATION OF WOUND VAC performed by Nina Ramirez MD at [address] [place] [address]      last revised           Admission Meds  Current Outpatient Prescriptions on File Prior to Visit   Medication Sig Dispense Refill    EPINEPHrine (EPIPEN 2-ERIKA) 0.3 MG/0.3ML SOAJ injection Inject as needed 2 each 1    Handicap Placard MISC by Does not apply route Myelomeningocele ([facility]  (primary encounter diagnosis)  Congenital hydrocephalus (HCC)  Multiple allergies    Duration:  5 years 1 each 0    loratadine (CLARITIN) 10 MG tablet Take 1 tablet by mouth daily 30 tablet 11    Gauze Pads & Dressings (GAUZE DRESSING) 4\"X4\" PADS 1 each by Does not apply route daily 30 each 5    docusate sodium (COLACE) 100 MG capsule Take 1 capsule by mouth 2 times daily 30 capsule 2    Gauze Pads & Dressings (NU GAUZE PACKING STRIPS) MISC 1/4 inch plain packing gauze wet to dry to right buttock wound once daily cover with dry dressing 1 each 1    Multiple Vitamins-Minerals (THERAPEUTIC MULTIVITAMIN-MINERALS) tablet Take 1 tablet by mouth daily      Riboflavin (VITAMIN B-2 PO) Take 1 tablet by mouth       No current facility-administered medications on file prior to visit. Allergies  Allergies   Allergen Reactions    Latex Swelling    Ceclor [Cefaclor] Hives    Seasonal Itching    Levaquin [Levofloxacin] Other (See Comments)     Tachycardia    Other      banannas    Peanuts [Peanut Oil] Hives        Social   Social History   Substance Use Topics    Smoking status: Never Smoker    Smokeless tobacco: Never Used    Alcohol use No             Family History   Problem Relation Age of Onset    Heart Disease Father     Heart Surgery Father     Hypertension Father     Heart Disease Paternal Grandfather     Heart Failure Paternal Grandfather     Osteoarthritis Mother     Liver Disease Maternal Grandmother     Heart Attack Maternal Grandfather           Review of Systems  No fever / chills / sweats. Denies cough / sputum. Denies chest pain, palpitations. Denies n / v / abd pain. No diarrhea. Tolerating antibiotics.          Physical Exam  /74 (Site: Right Arm, Position: Sitting, Cuff Size: Medium Adult)   Pulse 84   Temp 98 °F (36.7 °C) (Oral)   Resp 22   Ht 5' 1\" (1.549 m) Wt 150 lb (68 kg)   BMI 28.34 kg/m²       General Appearance: alert and oriented to person, place and time, well-developed and well-nourished, in no acute distress  Skin: warm and dry, no rash   Sacral decubitus ulcer with no signs of infection  Head: normocephalic and atraumatic     ENT: hearing grossly normal bilaterally  Neck: neck supple   Pulmonary/Chest: clear to auscultation bilaterally- no wheezes, rales or rhonchi, normal air movement, no respiratory distress  Cardiovascular: normal rate, regular rhythm, normal S1 and S2, no murmurs  Abdomen: soft, non-tender, non-distended  Extremities: no cyanosis, clubbing , left anterior knee swelling, redness resolving , no decrease range of motion                 Assessment:   Left knee Prepatellar bursitis culture grew Staph aureus mssa   Cephalosporin allergy   Sacral decubitus ulcer.   Neurogenic bladder  Spina bifida    S/P  shunt          Recommendations:   PO Bactrim for 10 days    Follow-up SREE Beck MD

## 2018-08-22 ENCOUNTER — HOSPITAL ENCOUNTER (OUTPATIENT)
Dept: WOUND CARE | Age: 28
Discharge: HOME | End: 2018-08-22
Payer: COMMERCIAL

## 2018-08-22 VITALS
DIASTOLIC BLOOD PRESSURE: 77 MMHG | HEART RATE: 88 BPM | BODY MASS INDEX: 28.32 KG/M2 | SYSTOLIC BLOOD PRESSURE: 126 MMHG | HEIGHT: 61 IN | WEIGHT: 150 LBS | TEMPERATURE: 97.6 F | RESPIRATION RATE: 18 BRPM

## 2018-08-22 PROCEDURE — 6370000000 HC RX 637 (ALT 250 FOR IP): Performed by: SURGERY

## 2018-08-22 PROCEDURE — 11043 DBRDMT MUSC&/FSCA 1ST 20/<: CPT

## 2018-08-22 RX ORDER — LIDOCAINE HYDROCHLORIDE 40 MG/ML
SOLUTION TOPICAL ONCE
Status: COMPLETED | OUTPATIENT
Start: 2018-08-22 | End: 2018-08-22

## 2018-08-22 RX ADMIN — LIDOCAINE HYDROCHLORIDE 10 ML: 40 SOLUTION TOPICAL at 09:16

## 2018-08-22 ASSESSMENT — PAIN SCALES - GENERAL: PAINLEVEL_OUTOF10: 0

## 2018-08-22 NOTE — PROGRESS NOTES
[facility] [address] [facility]   Progress Note and Procedure Note      Kimberlyn Justice  MEDICAL RECORD NUMBER:  [id number]  AGE: 32 y.o. GENDER: male  : 1990  EPISODE DATE:  2018    Subjective:     Chief Complaint   Patient presents with    Wound Check     coccyx         HISTORY of PRESENT ILLNESS HPI     Judd Vega is a 32 y.o. male who presents today for wound/ulcer evaluation. History of Wound Context: sacral wound following excision of pressure ulcer  Wound/Ulcer Pain Timing/Severity: none  Quality of pain: N/A  Severity:  0 / 10   Modifying Factors: None  Associated Signs/Symptoms: none    Ulcer Identification:  Ulcer Type: pressure  Contributing Factors: chronic pressure and decreased mobility    Wound: N/A        PAST MEDICAL HISTORY        Diagnosis Date    Chronic headaches     Complex partial seizures with consciousness impaired (HCC)     Congenital hydrocephalus (HCC)     Dysplasia of hip     Esotropia, unspecified     corrective surgery     Myelomeningocele ([facility]     L4 functional level    Neurogenic bladder     Prepatellar bursitis of left knee 2018    S/P  shunt     Scoliosis     Seizures (HCC)     Tethered cord ([address]     Release  and        PAST SURGICAL HISTORY    Past Surgical History:   Procedure Laterality Date    CYST INCISION AND DRAINAGE  01/10/2017    unroofing of chronic sinus tract right buttocks.     CYSTOSCOPY  5/29/15    EYE SURGERY      OTHER SURGICAL HISTORY Right 01/10/2017    unroofing buttock wound right side     SKIN BIOPSY Right 10/2/2017    EXCISION DEBRIDEMENT BUTTOCK WOUND WITH APPLICATION OF WOUND VAC performed by Reynaldo Florez MD at [address]      T7-L4    VENTRICULOPERITONEAL SHUNT      last revised        FAMILY HISTORY    Family History   Problem Relation Age of Onset    Heart Disease Father     Heart Surgery Father     Hypertension Father     Heart Disease Paternal Grandfather     Heart Failure 2.5 cm 8/22/2018  9:29 AM   Wound Depth (cm)  2.5 8/22/2018  9:29 AM   Calculated Wound Size (cm^2) (l*w) 2.5 cm^2 8/22/2018  9:29 AM   Change in Wound Size % (l*w) -1462.5 8/22/2018  9:29 AM   Distance Tunneling (cm) 9 cm 7/11/2017 10:03 AM   Tunneling Position ___ O'Clock 500 7/11/2017 10:03 AM   Undermining Starts ___ O'Clock 5 12/12/2017  9:42 AM   Undermining Ends___ O'Clock 7 12/12/2017  9:42 AM   Undermining Maxium Distance (cm) [email or web address] 12/12/2017  9:42 AM   Wound Assessment Red 8/22/2018  9:04 AM   Drainage Amount Moderate 8/22/2018  9:04 AM   Drainage Description Serosanguinous 8/22/2018  9:04 AM   Odor None 8/22/2018  9:04 AM   Margins Defined edges 7/31/2018  9:16 AM   Alayna-wound Assessment Calloused; White 8/22/2018  9:04 AM   [place]%Wound Bed 0 6/20/2018 10:08 AM   Red%Wound Bed 100 8/22/2018  9:04 AM   Yellow%Wound Bed 0 7/31/2018  9:16 AM   Black%Wound Bed 0 4/18/2018  9:55 AM   Purple%Wound Bed 0 5/18/2018  1:55 PM   Other%Wound Bed 0 2/28/2018  9:45 AM   Culture Taken Yes 7/11/2017 10:03 AM   Debridement per physician Muscle 1/24/2018 11:26 AM   Time out Yes 1/24/2018 11:26 AM   Procedural Pain 0 1/24/2018 11:26 AM   Post procedural Pain 0 1/24/2018 11:26 AM   Number of days: 624       Percent of Wound(s)/Ulcer(s) Debrided: 100%    Total Surface Area Debrided:  2.5 sq cm       Diabetic/Pressure/Non Pressure Ulcers only:  Ulcer: N/A      Estimated Blood Loss:  Minimal    Hemostasis Achieved:  by pressure    Procedural Pain:  0  / 10     Post Procedural Pain:  0 / 10     Response to treatment:  Well tolerated by patient. Plan:     Treatment Note please see attached Discharge Instructions    Written patient dismissal instructions given to patient and signed by patient or POA. Discharge Instructions                 [facility]                                                               Visit Jt Instructions / Physician Orders  07/31/18      Home

## 2018-08-29 ENCOUNTER — HOSPITAL ENCOUNTER (OUTPATIENT)
Dept: WOUND CARE | Age: 28
Discharge: HOME | End: 2018-08-29
Payer: COMMERCIAL

## 2018-08-29 VITALS
BODY MASS INDEX: 28.32 KG/M2 | TEMPERATURE: 97 F | WEIGHT: 150 LBS | RESPIRATION RATE: 18 BRPM | HEART RATE: 98 BPM | DIASTOLIC BLOOD PRESSURE: 78 MMHG | HEIGHT: 61 IN | SYSTOLIC BLOOD PRESSURE: 135 MMHG

## 2018-08-29 PROCEDURE — 11043 DBRDMT MUSC&/FSCA 1ST 20/<: CPT

## 2018-08-29 PROCEDURE — 6370000000 HC RX 637 (ALT 250 FOR IP): Performed by: SURGERY

## 2018-08-29 RX ORDER — LIDOCAINE HYDROCHLORIDE 40 MG/ML
SOLUTION TOPICAL ONCE
Status: COMPLETED | OUTPATIENT
Start: 2018-08-29 | End: 2018-08-29

## 2018-08-29 RX ADMIN — LIDOCAINE HYDROCHLORIDE 15 ML: 40 SOLUTION TOPICAL at 10:32

## 2018-08-29 ASSESSMENT — PAIN SCALES - GENERAL: PAINLEVEL_OUTOF10: 0

## 2018-08-29 NOTE — PROGRESS NOTES
left for Wesly at [facility] to call re: setting up f/u appt for hosp f/u by Isiah Lopez on 1/25  Called again today, not in, no voice mail  Asked to call back tomorrow am  [phone]  [address] [address]   Progress Note and Procedure Note      Kristin Huitron  MEDICAL RECORD NUMBER:  [id number]  AGE: 32 y.o. GENDER: male  : 1990  EPISODE DATE:  2018    Subjective:     Chief Complaint   Patient presents with    Wound Check         HISTORY of PRESENT ILLNESS HPI     Judd Brand is a 32 y.o. male who presents today for wound/ulcer evaluation. History of Wound Context: open wound following excisional debridement of pressure ulcer  Wound/Ulcer Pain Timing/Severity: none  Quality of pain: N/A  Severity:  0 / 10   Modifying Factors: None  Associated Signs/Symptoms: none    Ulcer Identification:  Ulcer Type: pressure  Contributing Factors: chronic pressure and decreased mobility    Wound: N/A        PAST MEDICAL HISTORY        Diagnosis Date    Chronic headaches     Complex partial seizures with consciousness impaired (HCC)     Congenital hydrocephalus (HCC)     Dysplasia of hip     Esotropia, unspecified     corrective surgery     Myelomeningocele ([facility]     L4 functional level    Neurogenic bladder     Prepatellar bursitis of left knee 2018    S/P  shunt     Scoliosis     Seizures (HCC)     Tethered cord ([address]     Release  and        PAST SURGICAL HISTORY    Past Surgical History:   Procedure Laterality Date    CYST INCISION AND DRAINAGE  01/10/2017    unroofing of chronic sinus tract right buttocks.     CYSTOSCOPY  5/29/15    EYE SURGERY      OTHER SURGICAL HISTORY Right 01/10/2017    unroofing buttock wound right side     SKIN BIOPSY Right 10/2/2017    EXCISION DEBRIDEMENT BUTTOCK WOUND WITH APPLICATION OF WOUND VAC performed by Carey Webster MD at [address]      T7-L4    VENTRICULOPERITONEAL SHUNT      last revised        FAMILY HISTORY    Family History   Problem Relation Age of Onset    Heart Disease Father     Heart Surgery Father     Hypertension Father     Heart Disease Paternal Grandfather     Heart 8/29/2018 10:41 AM   Calculated Wound Size (cm^2) (l*w) 2 cm^2 8/29/2018 10:41 AM   Change in Wound Size % (l*w) -1150 8/29/2018 10:41 AM   Distance Tunneling (cm) 9 cm 7/11/2017 10:03 AM   Tunneling Position ___ O'Clock 500 7/11/2017 10:03 AM   Undermining Starts ___ O'Clock 5 12/12/2017  9:42 AM   Undermining Ends___ O'Clock 7 12/12/2017  9:42 AM   Undermining Maxium Distance (cm) [place]@[email or web address] 12/12/2017  9:42 AM   Wound Assessment Red 8/29/2018 10:19 AM   Drainage Amount Moderate 8/29/2018 10:19 AM   Drainage Description Serosanguinous 8/29/2018 10:19 AM   Odor None 8/29/2018 10:19 AM   Margins Defined edges 7/31/2018  9:16 AM   Alayna-wound Assessment Calloused; White 8/29/2018 10:19 AM   [place]%Wound Bed 0 6/20/2018 10:08 AM   Red%Wound Bed 100 8/29/2018 10:19 AM   Yellow%Wound Bed 0 7/31/2018  9:16 AM   Black%Wound Bed 0 4/18/2018  9:55 AM   Purple%Wound Bed 0 5/18/2018  1:55 PM   Other%Wound Bed 0 2/28/2018  9:45 AM   Culture Taken Yes 7/11/2017 10:03 AM   Debridement per physician Muscle 1/24/2018 11:26 AM   Time out Yes 1/24/2018 11:26 AM   Procedural Pain 0 1/24/2018 11:26 AM   Post procedural Pain 0 1/24/2018 11:26 AM   Number of days: 631       Percent of Wound(s)/Ulcer(s) Debrided: 100%    Total Surface Area Debrided:  2 sq cm       Diabetic/Pressure/Non Pressure Ulcers only:  Ulcer: Pressure ulcer, Stage 3      Estimated Blood Loss:  Minimal    Hemostasis Achieved:  by silver nitrate stick    Procedural Pain:  0  / 10     Post Procedural Pain:  0 / 10     Response to treatment:  Well tolerated by patient. Plan:     Treatment Note please see attached Discharge Instructions    Written patient dismissal instructions given to patient and signed by patient or POA. Discharge Instructions                 [facility]                                                               Visit Jt Instructions / Physician Orders  08/29/18      Home Care:      SUPPLIES

## 2018-09-12 ENCOUNTER — HOSPITAL ENCOUNTER (OUTPATIENT)
Dept: WOUND CARE | Age: 28
Discharge: HOME | End: 2018-09-12
Payer: COMMERCIAL

## 2018-09-12 VITALS
BODY MASS INDEX: 28.32 KG/M2 | HEART RATE: 100 BPM | SYSTOLIC BLOOD PRESSURE: 130 MMHG | RESPIRATION RATE: 18 BRPM | TEMPERATURE: 97.8 F | WEIGHT: 150 LBS | HEIGHT: 61 IN | DIASTOLIC BLOOD PRESSURE: 70 MMHG

## 2018-09-12 PROCEDURE — 11043 DBRDMT MUSC&/FSCA 1ST 20/<: CPT

## 2018-09-12 RX ORDER — LIDOCAINE HYDROCHLORIDE 40 MG/ML
SOLUTION TOPICAL ONCE
Status: DISCONTINUED | OUTPATIENT
Start: 2018-09-12 | End: 2018-09-13 | Stop reason: HOSPADM

## 2018-09-12 ASSESSMENT — PAIN SCALES - GENERAL: PAINLEVEL_OUTOF10: 0

## 2018-09-12 NOTE — PLAN OF CARE
Problem: Pain:  Goal: Pain level will decrease  Pain level will decrease   Outcome: Completed Date Met: 09/12/18

## 2018-09-12 NOTE — PROGRESS NOTES
[address]   Progress Note and Procedure Note      Darcie Meier  MEDICAL RECORD NUMBER:  [id number]  AGE: 32 y.o. GENDER: male  : 1990  EPISODE DATE:  2018    Subjective:     Chief Complaint   Patient presents with    Wound Check     coccyx         HISTORY of PRESENT ILLNESS HPI     Judd Lucia is a 32 y.o. male who presents today for wound/ulcer evaluation. History of Wound Context: right gluteal wound following debridement of pressure ulcer  Wound/Ulcer Pain Timing/Severity: none  Quality of pain: N/A  Severity:  0 / 10   Modifying Factors: None  Associated Signs/Symptoms: none    Wound has developed callus since last visit, excised completely on last visit    Ulcer Identification:  Ulcer Type: pressure  Contributing Factors: chronic pressure and decreased mobility    Wound: N/A        PAST MEDICAL HISTORY        Diagnosis Date    Chronic headaches     Complex partial seizures with consciousness impaired (HCC)     Congenital hydrocephalus (HCC)     Dysplasia of hip     Esotropia, unspecified     corrective surgery     Myelomeningocele ([facility]     L4 functional level    Neurogenic bladder     Prepatellar bursitis of left knee 2018    S/P  shunt     Scoliosis     Seizures (HCC)     Tethered cord ([address]     Release  and        PAST SURGICAL HISTORY    Past Surgical History:   Procedure Laterality Date    CYST INCISION AND DRAINAGE  01/10/2017    unroofing of chronic sinus tract right buttocks.     CYSTOSCOPY  5/29/15    EYE SURGERY      OTHER SURGICAL HISTORY Right 01/10/2017    unroofing buttock wound right side     SKIN BIOPSY Right 10/2/2017    EXCISION DEBRIDEMENT BUTTOCK WOUND WITH APPLICATION OF WOUND VAC performed by Claudia Danielson MD at [address]      T7-L4    VENTRICULOPERITONEAL SHUNT      last revised        FAMILY HISTORY    Family History   Problem Relation Age of Onset    Heart Disease Father    [facility] kg)   08/29/18 150 lb (68 kg)   08/22/18 150 lb (68 kg)       PHYSICAL EXAM    General Appearance: alert and oriented to person, place and time, paraplegic due to spina bifida and well-nourished, in no acute distress  Skin: open wound right buttock with muscle at base      Assessment:      Patient Active Problem List   Diagnosis Code    Myelomeningocele ([facility] Q05.9    Congenital hydrocephalus ([facility] [facility] Q03.9    S/P  shunt Z98.2    Scoliosis M41.9    Complex partial seizures with consciousness impaired ([facility] [facility] G40.209    Chronic headaches R51    Wound of right buttock S31.819A    Surgical wound, non healing T81.89XA    Decubitus ulcer of right buttock, stage 3 (HCC) L89.313    Cellulitis L03.90    Prepatellar bursitis of left knee M70.42        Procedure Note  Indications:  Based on my examination of this patient's wound(s)/ulcer(s) today, debridement is required to promote healing and evaluate the wound base. Performed by: Isabel Muhammad DO    Consent obtained:  Yes    Time out taken:  Yes    Pain Control: Anesthetic  Anesthetic: 4% Lidocaine Liquid Topical       Debridement:Excisional Debridement    Using curette, scissors and forceps the wound(s)/ulcer(s) was/were sharply debrided down through and including the removal of subcutaneous tissue and muscle/fascia.         Devitalized Tissue Debrided:  exudate and callus    Pre Debridement Measurements:  Are located in the Wound/Ulcer Documentation Flow Sheet    Wound/Ulcer #: 4    Post Debridement Measurements:  Wound/Ulcer Descriptions are Pre Debridement except measurements:    Negative Pressure Wound Therapy Buttocks Right (Active)   Number of days: 345       Wound 12/05/16 #4 right buttock (Active)   Wound Image   6/6/2018  8:39 AM   Wound Type Wound 9/12/2018 10:12 AM   Dressing Status Old drainage 9/12/2018 10:12 AM   Dressing Changed Changed/New 9/12/2018 10:12 AM   Wound Cleansed Rinsed/Irrigated with saline 9/12/2018 10:12 AM   Wound Length (cm) 0.5 cm 9/12/2018 10:47 AM   Wound Width (cm) 2 cm 9/12/2018 10:47 AM   Wound Depth (cm)  2.0 9/12/2018 10:47 AM   Calculated Wound Size (cm^2) (l*w) 1 cm^2 9/12/2018 10:47 AM   Change in Wound Size % (l*w) -525 9/12/2018 10:47 AM   Distance Tunneling (cm) 9 cm 7/11/2017 10:03 AM   Tunneling Position ___ O'Clock 500 7/11/2017 10:03 AM   Undermining Starts ___ O'Clock 5 12/12/2017  9:42 AM   Undermining Ends___ O'Clock 7 12/12/2017  9:42 AM   Undermining Maxium Distance (cm) [email or web address] 12/12/2017  9:42 AM   Wound Assessment Red 9/12/2018 10:12 AM   Drainage Amount Moderate 9/12/2018 10:12 AM   Drainage Description Serosanguinous 9/12/2018 10:12 AM   Odor None 9/12/2018 10:12 AM   Margins Defined edges 7/31/2018  9:16 AM   Alayna-wound Assessment Maceration 9/12/2018 10:12 AM   [place]%Wound Bed 0 6/20/2018 10:08 AM   Red%Wound Bed 100 9/12/2018 10:12 AM   Yellow%Wound Bed 0 7/31/2018  9:16 AM   Black%Wound Bed 0 4/18/2018  9:55 AM   Purple%Wound Bed 0 5/18/2018  1:55 PM   Other%Wound Bed 0 2/28/2018  9:45 AM   Culture Taken Yes 7/11/2017 10:03 AM   Debridement per physician Muscle 1/24/2018 11:26 AM   Time out Yes 1/24/2018 11:26 AM   Procedural Pain 0 1/24/2018 11:26 AM   Post procedural Pain 0 1/24/2018 11:26 AM   Number of days: 645       Percent of Wound(s)/Ulcer(s) Debrided: 100%    Total Surface Area Debrided:  1 sq cm       Diabetic/Pressure/Non Pressure Ulcers only:  Ulcer: Pressure ulcer, Stage 4      Estimated Blood Loss:  Minimal    Hemostasis Achieved:  by silver nitrate stick    Procedural Pain:  0  / 10     Post Procedural Pain:  0 / 10     Response to treatment:  Well tolerated by patient. Plan:     Treatment Note please see attached Discharge Instructions    Written patient dismissal instructions given to patient and signed by patient or POA. Discharge Instructions                 [facility]                                                               Visit

## 2018-09-12 NOTE — PLAN OF CARE
Problem: Pain:  Goal: Control of chronic pain  Control of chronic pain   Outcome: Completed Date Met: 09/12/18

## 2018-09-18 ENCOUNTER — OFFICE VISIT (OUTPATIENT)
Dept: ORTHOPEDIC SURGERY | Age: 28
End: 2018-09-18

## 2018-09-18 DIAGNOSIS — M70.42 PREPATELLAR BURSITIS OF LEFT KNEE: Primary | ICD-10-CM

## 2018-09-18 PROCEDURE — 99024 POSTOP FOLLOW-UP VISIT: CPT | Performed by: ORTHOPAEDIC SURGERY

## 2018-09-18 NOTE — PROGRESS NOTES
Subjective:      Patient ID: Judd Howard is a 32 y.o. male. HPI     Follow-up prepatellar bursitis    Patient remains asymptomatic      Review of Systems    Objective:   Physical Exam  A patellar bursa is now completely resolved there is no signs of ongoing knee inflammation or swelling the ball bursa which had remained little bit prominent has now returned to the size of the contralateral knee  Assessment:      Encounter Diagnosis   Name Primary?     Prepatellar bursitis of left knee Yes           Plan:      emmy Acosta MD

## 2018-10-04 ENCOUNTER — HOSPITAL ENCOUNTER (OUTPATIENT)
Dept: WOUND CARE | Age: 28
Discharge: HOME | End: 2018-10-04

## 2018-10-12 ENCOUNTER — HOSPITAL ENCOUNTER (OUTPATIENT)
Dept: WOUND CARE | Age: 28
Discharge: HOME | End: 2018-10-12
Payer: COMMERCIAL

## 2018-10-15 ENCOUNTER — TELEPHONE (OUTPATIENT)
Dept: UROLOGY | Age: 28
End: 2018-10-15

## 2018-10-18 ENCOUNTER — HOSPITAL ENCOUNTER (OUTPATIENT)
Dept: WOUND CARE | Age: 28
Discharge: HOME | End: 2018-10-18
Payer: COMMERCIAL

## 2018-10-18 VITALS
WEIGHT: 150 LBS | BODY MASS INDEX: 28.34 KG/M2 | RESPIRATION RATE: 18 BRPM | SYSTOLIC BLOOD PRESSURE: 151 MMHG | TEMPERATURE: 98.3 F | HEART RATE: 103 BPM | DIASTOLIC BLOOD PRESSURE: 84 MMHG

## 2018-10-18 DIAGNOSIS — Q05.9 MYELOMENINGOCELE (HCC): Chronic | ICD-10-CM

## 2018-10-18 DIAGNOSIS — L89.313 DECUBITUS ULCER OF RIGHT BUTTOCK, STAGE 3 (HCC): Chronic | ICD-10-CM

## 2018-10-18 DIAGNOSIS — G80.8 OTHER CEREBRAL PALSY (HCC): Primary | ICD-10-CM

## 2018-10-18 DIAGNOSIS — Q03.9 CONGENITAL HYDROCEPHALUS (HCC): Chronic | ICD-10-CM

## 2018-10-18 PROCEDURE — 11043 DBRDMT MUSC&/FSCA 1ST 20/<: CPT

## 2018-10-18 PROCEDURE — 6370000000 HC RX 637 (ALT 250 FOR IP): Performed by: SURGERY

## 2018-10-18 PROCEDURE — 11042 DBRDMT SUBQ TIS 1ST 20SQCM/<: CPT

## 2018-10-18 RX ORDER — LIDOCAINE HYDROCHLORIDE 40 MG/ML
SOLUTION TOPICAL ONCE
Status: COMPLETED | OUTPATIENT
Start: 2018-10-18 | End: 2018-10-18

## 2018-10-18 RX ADMIN — LIDOCAINE HYDROCHLORIDE 5 ML: 40 SOLUTION TOPICAL at 15:05

## 2018-10-18 ASSESSMENT — PAIN SCALES - GENERAL: PAINLEVEL_OUTOF10: 0

## 2018-10-18 NOTE — PROGRESS NOTES
curette the wound(s)/ulcer(s) was/were sharply debrided down through and including the removal of subcutaneous tissue.         Devitalized Tissue Debrided:  fibrin, biofilm and slough    Pre Debridement Measurements:  Are located in the Wound/Ulcer Documentation Flow Sheet    Wound/Ulcer #: 1    Post Debridement Measurements:  Wound/Ulcer Descriptions are Pre Debridement except measurements:    Negative Pressure Wound Therapy Buttocks Right (Active)   Number of days: 381       Wound 12/05/16 #4 right buttock (Active)   Wound Image   10/18/2018  2:50 PM   Wound Type Wound 10/18/2018  2:50 PM   Dressing Status Old drainage 10/18/2018  2:50 PM   Dressing Changed Changed/New 10/18/2018  2:50 PM   Wound Cleansed Rinsed/Irrigated with saline 10/18/2018  2:50 PM   Wound Length (cm) 1 cm 10/18/2018  3:28 PM   Wound Width (cm) 2 cm 10/18/2018  3:28 PM   Wound Depth (cm)  2.2 10/18/2018  3:28 PM   Calculated Wound Size (cm^2) (l*w) 2 cm^2 10/18/2018  3:28 PM   Change in Wound Size % (l*w) -1150 10/18/2018  3:28 PM   Distance Tunneling (cm) 0 cm 10/18/2018  2:50 PM   Tunneling Position ___ O'Clock 0 10/18/2018  2:50 PM   Undermining Starts ___ O'Clock 0 10/18/2018  2:50 PM   Undermining Ends___ O'Clock 0 10/18/2018  2:50 PM   Undermining Maxium Distance (cm) 0 10/18/2018  2:50 PM   Wound Assessment Red 9/12/2018 10:12 AM   Drainage Amount Moderate 10/18/2018  2:50 PM   Drainage Description Serosanguinous 10/18/2018  2:50 PM   Odor None 10/18/2018  2:50 PM   Margins Defined edges 7/31/2018  9:16 AM   Alayna-wound Assessment Maceration 10/18/2018  2:50 PM   [place]%Wound Bed 0 6/20/2018 10:08 AM   Red%Wound Bed 100 10/18/2018  2:50 PM   Yellow%Wound Bed 0 7/31/2018  9:16 AM   Black%Wound Bed 0 4/18/2018  9:55 AM   Purple%Wound Bed 0 5/18/2018  1:55 PM   Other%Wound Bed 0 2/28/2018  9:45 AM   Culture Taken Yes 7/11/2017 10:03 AM   Debridement per physician Muscle 1/24/2018 11:26 AM   Time out Yes 1/24/2018 11:26 AM   Procedural Pain 0 1/24/2018 11:26 AM   Post procedural Pain 0 1/24/2018 11:26 AM   Number of days: 682       Percent of Wound(s)/Ulcer(s) Debrided: 100%    Total Surface Area Debrided:  2 sq cm       Diabetic/Pressure/Non Pressure Ulcers only:  Ulcer: Pressure ulcer, Stage 3      Estimated Blood Loss:  Minimal    Hemostasis Achieved:  by pressure    Procedural Pain:  0  / 10     Post Procedural Pain:  0 / 10     Response to treatment:  Well tolerated by patient. Plan:     Treatment Note please see attached Discharge Instructions    Written patient dismissal instructions given to patient and signed by patient or POA. Discharge Instructions                 [facility] and [facility]PERBAR[facility] TRE[facility]  CENTER                                                               Visit Alecshanice Instructions / Physician Orders  10/18/18      Home Care:      SUPPLIES ORDERED THRU: [facility]      Wound Location:  Right buttock     Cleanse with: Liquid antibacterial soap and water,rinse well      Dressing Orders: Apply silvercel to the wound, cover with a dry dressing     Frequency: daily     Additional Orders: Increase protein to diet (meat, cheese, eggs, fish, peanut butter, nuts and beans)  Multivitamin daily  Up for meals- only 1/2 hr at a time     Your next appointment with [address] [place] [address] is in 2 weeks Dr. Charles Alicia        TIME [x] 45 MIN     [] 60 MIN     (Please note your next appointment above and if you are unable to keep, kindly give a 24 hour notice.  Thank you.)     If you experience any of the following, please call the [address] [place] [address] during business hours:  [phone]     * Increase in Pain  * Temperature over 101  * Increase in drainage from your wound  * Drainage with a foul odor  * Bleeding  * Increase in swelling  * Need for compression bandage changes due to slippage, breakthrough drainage.     If you need medical attention outside of the business hours of the Wound

## 2018-10-31 ENCOUNTER — HOSPITAL ENCOUNTER (OUTPATIENT)
Dept: WOUND CARE | Age: 28
Discharge: HOME | End: 2018-10-31
Payer: COMMERCIAL

## 2018-10-31 VITALS
WEIGHT: 150 LBS | RESPIRATION RATE: 18 BRPM | BODY MASS INDEX: 28.32 KG/M2 | TEMPERATURE: 97.6 F | HEART RATE: 97 BPM | SYSTOLIC BLOOD PRESSURE: 106 MMHG | HEIGHT: 61 IN | DIASTOLIC BLOOD PRESSURE: 51 MMHG

## 2018-10-31 PROCEDURE — 97597 DBRDMT OPN WND 1ST 20 CM/<: CPT

## 2018-10-31 PROCEDURE — 6370000000 HC RX 637 (ALT 250 FOR IP): Performed by: SURGERY

## 2018-10-31 PROCEDURE — 11043 DBRDMT MUSC&/FSCA 1ST 20/<: CPT

## 2018-10-31 RX ORDER — LIDOCAINE HYDROCHLORIDE 40 MG/ML
SOLUTION TOPICAL ONCE
Status: COMPLETED | OUTPATIENT
Start: 2018-10-31 | End: 2018-10-31

## 2018-10-31 RX ADMIN — LIDOCAINE HYDROCHLORIDE 5 ML: 40 SOLUTION TOPICAL at 09:40

## 2018-10-31 ASSESSMENT — PAIN SCALES - GENERAL: PAINLEVEL_OUTOF10: 0

## 2018-10-31 NOTE — PROGRESS NOTES
(cm) 3 cm 10/31/2018 10:07 AM   Wound Depth (cm)  2.5 10/31/2018 10:07 AM   Calculated Wound Size (cm^2) (l*w) 3 cm^2 10/31/2018 10:07 AM   Change in Wound Size % (l*w) -1775 10/31/2018 10:07 AM   Distance Tunneling (cm) 0 cm 10/18/2018  2:50 PM   Tunneling Position ___ O'Clock 0 10/18/2018  2:50 PM   Undermining Starts ___ O'Clock 0 10/18/2018  2:50 PM   Undermining Ends___ O'Clock 0 10/18/2018  2:50 PM   Undermining Maxium Distance (cm) 0 10/18/2018  2:50 PM   Wound Assessment Red 9/12/2018 10:12 AM   Drainage Amount Moderate 10/31/2018  9:39 AM   Drainage Description Serosanguinous 10/31/2018  9:39 AM   Odor None 10/31/2018  9:39 AM   Margins Defined edges 10/31/2018  9:39 AM   Alayna-wound Assessment Maceration;Calloused 10/31/2018  9:39 AM   [place]%Wound Bed 0 6/20/2018 10:08 AM   Red%Wound Bed 100 10/31/2018  9:39 AM   Yellow%Wound Bed 0 7/31/2018  9:16 AM   Black%Wound Bed 0 4/18/2018  9:55 AM   Purple%Wound Bed 0 5/18/2018  1:55 PM   Other%Wound Bed 0 2/28/2018  9:45 AM   Culture Taken Yes 7/11/2017 10:03 AM   Debridement per physician Muscle 1/24/2018 11:26 AM   Time out Yes 1/24/2018 11:26 AM   Procedural Pain 0 1/24/2018 11:26 AM   Post procedural Pain 0 1/24/2018 11:26 AM   Number of days: 694       Percent of Wound(s)/Ulcer(s) Debrided: 100%    Total Surface Area Debrided:  3 sq cm       Diabetic/Pressure/Non Pressure Ulcers only:  Ulcer: Pressure ulcer, Stage 4      Estimated Blood Loss:  Minimal    Hemostasis Achieved:  by silver nitrate stick    Procedural Pain:  0  / 10     Post Procedural Pain:  0 / 10     Response to treatment:  Well tolerated by patient. Plan:     Treatment Note please see attached Discharge Instructions    Written patient dismissal instructions given to patient and signed by patient or POA. Discharge Instructions             [facility] W[facility]PERBAR[facility] TREAT[facility]  CENTER                                                               Visit  Discharge Instructions /

## 2018-11-26 ENCOUNTER — PROCEDURE VISIT (OUTPATIENT)
Dept: UROLOGY | Age: 28
End: 2018-11-26
Payer: COMMERCIAL

## 2018-11-26 VITALS
BODY MASS INDEX: 28.52 KG/M2 | SYSTOLIC BLOOD PRESSURE: 158 MMHG | WEIGHT: 155 LBS | TEMPERATURE: 98.1 F | HEIGHT: 62 IN | HEART RATE: 106 BPM | DIASTOLIC BLOOD PRESSURE: 87 MMHG

## 2018-11-26 DIAGNOSIS — N31.9 NEUROGENIC BLADDER: Primary | ICD-10-CM

## 2018-11-26 PROCEDURE — 99999 PR OFFICE/OUTPT VISIT,PROCEDURE ONLY: CPT | Performed by: UROLOGY

## 2018-11-26 PROCEDURE — 52000 CYSTOURETHROSCOPY: CPT | Performed by: UROLOGY

## 2018-12-05 ENCOUNTER — HOSPITAL ENCOUNTER (OUTPATIENT)
Dept: WOUND CARE | Age: 28
Discharge: HOME | End: 2018-12-05
Payer: MEDICARE

## 2018-12-05 VITALS
RESPIRATION RATE: 18 BRPM | HEART RATE: 101 BPM | BODY MASS INDEX: 29.27 KG/M2 | HEIGHT: 61 IN | SYSTOLIC BLOOD PRESSURE: 140 MMHG | DIASTOLIC BLOOD PRESSURE: 83 MMHG | WEIGHT: 155 LBS | TEMPERATURE: 97.1 F

## 2018-12-05 PROCEDURE — 6370000000 HC RX 637 (ALT 250 FOR IP): Performed by: SURGERY

## 2018-12-05 PROCEDURE — 11043 DBRDMT MUSC&/FSCA 1ST 20/<: CPT

## 2018-12-05 RX ORDER — LIDOCAINE HYDROCHLORIDE 40 MG/ML
SOLUTION TOPICAL ONCE
Status: COMPLETED | OUTPATIENT
Start: 2018-12-05 | End: 2018-12-05

## 2018-12-05 RX ADMIN — LIDOCAINE HYDROCHLORIDE: 40 SOLUTION TOPICAL at 10:30

## 2018-12-05 ASSESSMENT — PAIN SCALES - GENERAL: PAINLEVEL_OUTOF10: 0

## 2018-12-05 NOTE — PROGRESS NOTES
EXAM    General Appearance: alert and oriented to person, place and time, paraplegic,  in no acute distress  Skin: open wound of right gluteus with granulation tissue at the base, significant callus formation at lateral margins      Assessment:        Problem List Items Addressed This Visit     None           Procedure Note  Indications:  Based on my examination of this patient's wound(s)/ulcer(s) today, debridement is required to promote healing and evaluate the wound base. Performed by: Marisol Angela DO    Consent obtained:  Yes    Time out taken:  Yes    Pain Control: Anesthetic  Anesthetic: 4% Lidocaine Liquid Topical       Debridement:Excisional Debridement    Using curette, scissors and forceps the wound(s)/ulcer(s) was/were sharply debrided down through and including the removal of subcutaneous tissue and muscle/fascia.         Devitalized Tissue Debrided:  fibrin, biofilm, slough and callus    Pre Debridement Measurements:  Are located in the [place]  Documentation Flow Sheet    Wound/Ulcer #: 4    Post Debridement Measurements:  Wound/Ulcer Descriptions are Pre Debridement except measurements:    Negative Pressure Wound Therapy Buttocks Right (Active)   Number of days: 429       Wound 12/05/16 #4 right buttock (Active)   Wound Image   10/18/2018  2:50 PM   Wound Type Wound 10/31/2018  9:39 AM   Dressing Status Old drainage 10/31/2018  9:39 AM   Dressing Changed Changed/New 10/31/2018  9:39 AM   Wound Cleansed Rinsed/Irrigated with saline 10/31/2018  9:39 AM   Wound Length (cm) 1 cm 10/31/2018 10:07 AM   Wound Width (cm) 3 cm 10/31/2018 10:07 AM   Wound Depth (cm)  2.5 10/31/2018 10:07 AM   Calculated Wound Size (cm^2) (l*w) 3 cm^2 10/31/2018 10:07 AM   Change in Wound Size % (l*w) -1775 10/31/2018 10:07 AM   Distance Tunneling (cm) 0 cm 10/18/2018  2:50 PM   Tunneling Position ___ O'Clock 0 10/18/2018  2:50 PM   Undermining Starts ___ O'Clock 0 10/18/2018  2:50 PM   Undermining Ends___ O'Clock 0

## 2018-12-19 ENCOUNTER — HOSPITAL ENCOUNTER (OUTPATIENT)
Dept: WOUND CARE | Age: 28
Discharge: HOME | End: 2018-12-19
Payer: MEDICARE

## 2018-12-19 VITALS
TEMPERATURE: 97.7 F | HEART RATE: 104 BPM | HEIGHT: 61 IN | RESPIRATION RATE: 18 BRPM | WEIGHT: 155 LBS | SYSTOLIC BLOOD PRESSURE: 136 MMHG | BODY MASS INDEX: 29.27 KG/M2 | DIASTOLIC BLOOD PRESSURE: 77 MMHG

## 2018-12-19 PROCEDURE — 6370000000 HC RX 637 (ALT 250 FOR IP): Performed by: SURGERY

## 2018-12-19 PROCEDURE — 11043 DBRDMT MUSC&/FSCA 1ST 20/<: CPT

## 2018-12-19 RX ORDER — LIDOCAINE HYDROCHLORIDE 40 MG/ML
SOLUTION TOPICAL ONCE
Status: COMPLETED | OUTPATIENT
Start: 2018-12-19 | End: 2018-12-19

## 2018-12-19 RX ADMIN — LIDOCAINE HYDROCHLORIDE 5 ML: 40 SOLUTION TOPICAL at 10:21

## 2018-12-19 ASSESSMENT — PAIN SCALES - GENERAL: PAINLEVEL_OUTOF10: 0

## 2018-12-19 ASSESSMENT — PAIN DESCRIPTION - LOCATION: LOCATION: BUTTOCKS

## 2018-12-19 ASSESSMENT — PAIN DESCRIPTION - PAIN TYPE: TYPE: CHRONIC PAIN

## 2018-12-19 ASSESSMENT — PAIN DESCRIPTION - ORIENTATION: ORIENTATION: RIGHT

## 2018-12-19 NOTE — PROGRESS NOTES
kg)       PHYSICAL EXAM    General Appearance: alert and oriented to person, place and time, paraplegic and well-nourished, in no acute distress  Skin: open right gluteal wound to muscle      Assessment:        Problem List Items Addressed This Visit     None           Procedure Note  Indications:  Based on my examination of this patient's wound(s)/ulcer(s) today, debridement is required to promote healing and evaluate the wound base. Performed by: Asmita Grijalva DO    Consent obtained:  Yes    Time out taken:  Yes    Pain Control:         Debridement:Excisional Debridement    Using curette, scissors and forceps the wound(s)/ulcer(s) was/were sharply debrided down through and including the removal of subcutaneous tissue and muscle/fascia.         Devitalized Tissue Debrided:  fibrin, biofilm, slough and callus    Pre Debridement Measurements:  Are located in the [place]  Documentation Flow Sheet    Wound/Ulcer #: 4    Post Debridement Measurements:  Wound/Ulcer Descriptions are Pre Debridement except measurements:    Negative Pressure Wound Therapy Buttocks Right (Active)   Number of days: 443       Wound 12/05/16 #4 right buttock (Active)   Wound Image   10/18/2018  2:50 PM   Wound Type Wound 10/31/2018  9:39 AM   Dressing Status Old drainage 10/31/2018  9:39 AM   Dressing Changed Changed/New 10/31/2018  9:39 AM   Wound Cleansed Rinsed/Irrigated with saline 10/31/2018  9:39 AM   Wound Length (cm) 1 cm 10/31/2018 10:07 AM   Wound Width (cm) 3 cm 10/31/2018 10:07 AM   Wound Depth (cm)  2.5 10/31/2018 10:07 AM   Calculated Wound Size (cm^2) (l*w) 3 cm^2 10/31/2018 10:07 AM   Change in Wound Size % (l*w) -1775 10/31/2018 10:07 AM   Distance Tunneling (cm) 0 cm 10/18/2018  2:50 PM   Tunneling Position ___ O'Clock 0 10/18/2018  2:50 PM   Undermining Starts ___ O'Clock 0 10/18/2018  2:50 PM   Undermining Ends___ O'Clock 0 10/18/2018  2:50 PM   Undermining Maxium Distance (cm) 0 10/18/2018  2:50 PM   Wound Assessment Red 9/12/2018 10:12 AM   Drainage Amount Moderate 10/31/2018  9:39 AM   Drainage Description Serosanguinous 10/31/2018  9:39 AM   Odor None 10/31/2018  9:39 AM   Margins Defined edges 10/31/2018  9:39 AM   Alayna-wound Assessment Maceration;Calloused 10/31/2018  9:39 AM   [place]%Wound Bed 0 6/20/2018 10:08 AM   Red%Wound Bed 100 10/31/2018  9:39 AM   Yellow%Wound Bed 0 7/31/2018  9:16 AM   Black%Wound Bed 0 4/18/2018  9:55 AM   Purple%Wound Bed 0 5/18/2018  1:55 PM   Other%Wound Bed 0 2/28/2018  9:45 AM   Culture Taken Yes 7/11/2017 10:03 AM   Debridement per physician Muscle 1/24/2018 11:26 AM   Time out Yes 1/24/2018 11:26 AM   Procedural Pain 0 1/24/2018 11:26 AM   Post procedural Pain 0 1/24/2018 11:26 AM   Number of days: 744       Wound 12/05/18 Buttocks Lower;Right wound #4 right lower buttock (Active)   Wound Image   12/5/2018 10:26 AM   Wound Pressure Stage  3 12/19/2018 10:18 AM   Dressing Status Old drainage 12/19/2018 10:18 AM   Dressing Changed Changed/New 12/19/2018 10:18 AM   Wound Cleansed Rinsed/Irrigated with saline 12/19/2018 10:18 AM   Wound Length (cm) 0.2 cm 12/19/2018 10:18 AM   Wound Width (cm) 2.1 cm 12/19/2018 10:18 AM   Wound Depth (cm) 1.7 cm 12/19/2018 10:18 AM   Wound Surface Area (cm^2) 0.42 cm^2 12/19/2018 10:18 AM   Change in Wound Size % (l*w) 65 12/19/2018 10:18 AM   Wound Volume (cm^3) 0.71 cm^3 12/19/2018 10:18 AM   Wound Healing % 65 12/19/2018 10:18 AM   Post-Procedure Length (cm) 0.5 cm 12/19/2018 10:18 AM   Post-Procedure Width (cm) 2.2 cm 12/19/2018 10:18 AM   Post-Procedure Depth (cm) 1.5 cm 12/19/2018 10:18 AM   Post-Procedure Surface Area (cm^2) 1.1 cm^2 12/19/2018 10:18 AM   Post-Procedure Volume (cm^3) 1.65 cm^3 12/19/2018 10:18 AM   Wound Assessment Pink;Red 12/19/2018 10:18 AM   Drainage Amount Moderate 12/19/2018 10:18 AM   Drainage Description Serosanguinous 12/19/2018 10:18 AM   Odor None 12/19/2018 10:18 AM   Alayna-wound Assessment Calloused;Clean 12/19/2018

## 2019-01-09 ENCOUNTER — HOSPITAL ENCOUNTER (OUTPATIENT)
Dept: WOUND CARE | Age: 29
Discharge: HOME | End: 2019-01-09
Payer: MEDICARE

## 2019-01-09 VITALS
DIASTOLIC BLOOD PRESSURE: 89 MMHG | WEIGHT: 155 LBS | RESPIRATION RATE: 18 BRPM | SYSTOLIC BLOOD PRESSURE: 136 MMHG | HEART RATE: 95 BPM | TEMPERATURE: 97.2 F | HEIGHT: 61 IN | BODY MASS INDEX: 29.27 KG/M2

## 2019-01-09 PROCEDURE — 11043 DBRDMT MUSC&/FSCA 1ST 20/<: CPT

## 2019-01-09 RX ORDER — LIDOCAINE HYDROCHLORIDE 40 MG/ML
SOLUTION TOPICAL ONCE
Status: DISCONTINUED | OUTPATIENT
Start: 2019-01-09 | End: 2019-01-10 | Stop reason: HOSPADM

## 2019-01-09 ASSESSMENT — PAIN SCALES - GENERAL: PAINLEVEL_OUTOF10: 0

## 2019-01-30 ENCOUNTER — HOSPITAL ENCOUNTER (OUTPATIENT)
Dept: WOUND CARE | Age: 29
Discharge: HOME | End: 2019-01-30

## 2019-02-28 ENCOUNTER — HOSPITAL ENCOUNTER (OUTPATIENT)
Dept: WOUND CARE | Age: 29
Discharge: HOME | End: 2019-02-28
Payer: MEDICARE

## 2019-02-28 VITALS
HEIGHT: 61 IN | TEMPERATURE: 97.3 F | DIASTOLIC BLOOD PRESSURE: 91 MMHG | BODY MASS INDEX: 29.27 KG/M2 | RESPIRATION RATE: 18 BRPM | WEIGHT: 155 LBS | SYSTOLIC BLOOD PRESSURE: 143 MMHG | HEART RATE: 104 BPM

## 2019-02-28 DIAGNOSIS — L89.313 DECUBITUS ULCER OF RIGHT BUTTOCK, STAGE 3 (HCC): Chronic | ICD-10-CM

## 2019-02-28 DIAGNOSIS — S31.819D WOUND OF RIGHT BUTTOCK, SUBSEQUENT ENCOUNTER: Primary | Chronic | ICD-10-CM

## 2019-02-28 DIAGNOSIS — Q05.9 MYELOMENINGOCELE (HCC): Chronic | ICD-10-CM

## 2019-02-28 PROCEDURE — 11042 DBRDMT SUBQ TIS 1ST 20SQCM/<: CPT

## 2019-02-28 RX ORDER — LIDOCAINE HYDROCHLORIDE 40 MG/ML
SOLUTION TOPICAL ONCE
Status: DISCONTINUED | OUTPATIENT
Start: 2019-02-28 | End: 2019-03-01 | Stop reason: HOSPADM

## 2019-02-28 ASSESSMENT — PAIN SCALES - GENERAL: PAINLEVEL_OUTOF10: 0

## 2019-03-14 ENCOUNTER — HOSPITAL ENCOUNTER (OUTPATIENT)
Dept: WOUND CARE | Age: 29
Discharge: HOME | End: 2019-03-14
Payer: MEDICARE

## 2019-03-14 VITALS
RESPIRATION RATE: 18 BRPM | SYSTOLIC BLOOD PRESSURE: 143 MMHG | TEMPERATURE: 97.7 F | HEART RATE: 113 BPM | HEIGHT: 61 IN | BODY MASS INDEX: 29.27 KG/M2 | WEIGHT: 155 LBS | DIASTOLIC BLOOD PRESSURE: 80 MMHG

## 2019-03-14 DIAGNOSIS — L89.313 DECUBITUS ULCER OF RIGHT BUTTOCK, STAGE 3 (HCC): Chronic | ICD-10-CM

## 2019-03-14 DIAGNOSIS — T81.89XD NON-HEALING SURGICAL WOUND, SUBSEQUENT ENCOUNTER: Primary | Chronic | ICD-10-CM

## 2019-03-14 PROCEDURE — 11042 DBRDMT SUBQ TIS 1ST 20SQCM/<: CPT

## 2019-03-14 PROCEDURE — 87070 CULTURE OTHR SPECIMN AEROBIC: CPT

## 2019-03-14 PROCEDURE — 87075 CULTR BACTERIA EXCEPT BLOOD: CPT

## 2019-03-14 PROCEDURE — 6370000000 HC RX 637 (ALT 250 FOR IP): Performed by: NURSE PRACTITIONER

## 2019-03-14 PROCEDURE — 11042 DBRDMT SUBQ TIS 1ST 20SQCM/<: CPT | Performed by: NURSE PRACTITIONER

## 2019-03-14 PROCEDURE — 86403 PARTICLE AGGLUT ANTBDY SCRN: CPT

## 2019-03-14 PROCEDURE — 87076 CULTURE ANAEROBE IDENT EACH: CPT

## 2019-03-14 PROCEDURE — 87185 SC STD ENZYME DETCJ PER NZM: CPT

## 2019-03-14 PROCEDURE — 87205 SMEAR GRAM STAIN: CPT

## 2019-03-14 RX ORDER — DOXYCYCLINE HYCLATE 100 MG
100 TABLET ORAL 2 TIMES DAILY
Qty: 20 TABLET | Refills: 0 | Status: SHIPPED | OUTPATIENT
Start: 2019-03-14 | End: 2019-03-24

## 2019-03-14 RX ORDER — LIDOCAINE HYDROCHLORIDE 40 MG/ML
SOLUTION TOPICAL ONCE
Status: COMPLETED | OUTPATIENT
Start: 2019-03-14 | End: 2019-03-14

## 2019-03-14 RX ADMIN — LIDOCAINE HYDROCHLORIDE: 40 SOLUTION TOPICAL at 10:24

## 2019-03-14 ASSESSMENT — PAIN SCALES - GENERAL: PAINLEVEL_OUTOF10: 0

## 2019-03-17 LAB
CULTURE: ABNORMAL
DIRECT EXAM: ABNORMAL
DIRECT EXAM: ABNORMAL
Lab: ABNORMAL
SPECIMEN DESCRIPTION: ABNORMAL

## 2019-03-29 ENCOUNTER — TELEPHONE (OUTPATIENT)
Dept: UROLOGY | Age: 29
End: 2019-03-29

## 2019-04-10 ENCOUNTER — HOSPITAL ENCOUNTER (OUTPATIENT)
Dept: WOUND CARE | Age: 29
Discharge: HOME | End: 2019-04-10
Payer: MEDICARE

## 2019-04-10 RX ORDER — LIDOCAINE HYDROCHLORIDE 40 MG/ML
SOLUTION TOPICAL ONCE
Status: DISCONTINUED | OUTPATIENT
Start: 2019-04-10 | End: 2019-04-13 | Stop reason: HOSPADM

## 2019-09-25 NOTE — ED NOTES
0125: Report called to [facility]. Pt being admitted to [facility]] Unit room #2063.            Sanjiv Trejo RN  06/30/18 9467 FYI

## 2019-09-30 ENCOUNTER — OFFICE VISIT (OUTPATIENT)
Dept: UROLOGY | Age: 29
End: 2019-09-30
Payer: MEDICARE

## 2019-09-30 VITALS
SYSTOLIC BLOOD PRESSURE: 129 MMHG | BODY MASS INDEX: 29.3 KG/M2 | HEART RATE: 99 BPM | TEMPERATURE: 98.2 F | DIASTOLIC BLOOD PRESSURE: 82 MMHG | HEIGHT: 61 IN

## 2019-09-30 DIAGNOSIS — N31.9 NEUROGENIC BLADDER: Primary | ICD-10-CM

## 2019-09-30 DIAGNOSIS — R33.9 URINARY RETENTION: ICD-10-CM

## 2019-09-30 PROCEDURE — G8427 DOCREV CUR MEDS BY ELIG CLIN: HCPCS | Performed by: UROLOGY

## 2019-09-30 PROCEDURE — G8419 CALC BMI OUT NRM PARAM NOF/U: HCPCS | Performed by: UROLOGY

## 2019-09-30 PROCEDURE — 99213 OFFICE O/P EST LOW 20 MIN: CPT | Performed by: UROLOGY

## 2019-09-30 PROCEDURE — 1036F TOBACCO NON-USER: CPT | Performed by: UROLOGY

## 2019-09-30 ASSESSMENT — ENCOUNTER SYMPTOMS
WHEEZING: 0
EYE PAIN: 0
NAUSEA: 0
SHORTNESS OF BREATH: 0
VOMITING: 0
ABDOMINAL PAIN: 0
BACK PAIN: 0
COUGH: 0
EYE REDNESS: 0
COLOR CHANGE: 0

## 2021-01-04 ENCOUNTER — OFFICE VISIT (OUTPATIENT)
Dept: UROLOGY | Age: 31
End: 2021-01-04
Payer: MEDICARE

## 2021-01-04 VITALS
WEIGHT: 160 LBS | SYSTOLIC BLOOD PRESSURE: 119 MMHG | TEMPERATURE: 98 F | HEART RATE: 100 BPM | DIASTOLIC BLOOD PRESSURE: 73 MMHG | BODY MASS INDEX: 30.21 KG/M2 | HEIGHT: 61 IN

## 2021-01-04 DIAGNOSIS — R33.9 URINARY RETENTION: ICD-10-CM

## 2021-01-04 DIAGNOSIS — N31.9 NEUROGENIC BLADDER: Primary | ICD-10-CM

## 2021-01-04 PROCEDURE — 1036F TOBACCO NON-USER: CPT | Performed by: UROLOGY

## 2021-01-04 PROCEDURE — G8417 CALC BMI ABV UP PARAM F/U: HCPCS | Performed by: UROLOGY

## 2021-01-04 PROCEDURE — G8427 DOCREV CUR MEDS BY ELIG CLIN: HCPCS | Performed by: UROLOGY

## 2021-01-04 PROCEDURE — 99213 OFFICE O/P EST LOW 20 MIN: CPT | Performed by: UROLOGY

## 2021-01-04 PROCEDURE — G8484 FLU IMMUNIZE NO ADMIN: HCPCS | Performed by: UROLOGY

## 2021-01-04 RX ORDER — COLLAGENASE SANTYL 250 [ARB'U]/G
OINTMENT TOPICAL
COMMUNITY
Start: 2020-10-07

## 2021-01-04 RX ORDER — DOCUSATE SODIUM 100 MG/1
100 CAPSULE, LIQUID FILLED ORAL 2 TIMES DAILY
Qty: 180 CAPSULE | Refills: 1 | Status: SHIPPED | OUTPATIENT
Start: 2021-01-04 | End: 2021-04-04

## 2021-01-04 ASSESSMENT — ENCOUNTER SYMPTOMS
WHEEZING: 0
ABDOMINAL PAIN: 0
NAUSEA: 0
VOMITING: 0
COUGH: 0
SHORTNESS OF BREATH: 0
EYE REDNESS: 0
EYE PAIN: 0
CONSTIPATION: 0
DIARRHEA: 0
BACK PAIN: 0

## 2021-01-04 NOTE — PROGRESS NOTES
[address] [address]  Dept: 92 [facility] Urology Office Note - Established    Patient:  Jamie Fairchild  YOB: 1990  Date: 1/4/2021    The patient is a 27 y.o. male who presents todayfor evaluation of the following problems:   Chief Complaint   Patient presents with    Incontinence     yearly check for neurigenic bladder       HPI  This is a very pleasant 68-year-old gentleman with a history of neurogenic bladder. He does do self-catheterization 3-4 times per day. He caths without difficulty. He has not had a recent cystoscopy. He voices no new complaints. Summary of old records: N/A    Additional History: N/A    Procedures Today: N/A    Urinalysis today:  No results found for this visit on 01/04/21.   Last several PSA's:  No results found for: PSA  Last total testosterone:  No results found for: TESTOSTERONE    AUA Symptom Score (1/4/2021):                               Last BUN and creatinine:  Lab Results   Component Value Date    BUN 10 07/01/2018     Lab Results   Component Value Date    CREATININE 0.82 07/03/2018       Additional Lab/Culture results: none    Imaging Reviewed during this Office Visit: none  (results were independently reviewed by physician and radiology report verified)    PAST MEDICAL, FAMILY AND SOCIAL HISTORY UPDATE:  Past Medical History:   Diagnosis Date    Chronic headaches     Complex partial seizures with consciousness impaired ([address]     Congenital hydrocephalus ([address]     Dysplasia of hip     Esotropia, unspecified     corrective surgery 2000    Myelomeningocele ([address]     L4 functional level    Neurogenic bladder     Prepatellar bursitis of left knee 7/1/2018    S/P  shunt     Scoliosis     Seizures ([address]     Tethered cord ([address]     Release 1993 and 1996     Past Surgical History:   Procedure Laterality Date    CYST INCISION AND DRAINAGE  01/10/2017 unroofing of chronic sinus tract right buttocks.     CYSTOSCOPY  5/29/15    EYE SURGERY      OTHER SURGICAL HISTORY Right 01/10/2017    unroofing buttock wound right side     SKIN BIOPSY Right 10/2/2017    EXCISION DEBRIDEMENT BUTTOCK WOUND WITH APPLICATION OF WOUND VAC performed by Guillermina Austin MD at [address] [facility]      last revised 1999     Family History   Problem Relation Age of Onset    Heart Disease Father     Heart Surgery Father     Hypertension Father     Heart Disease Paternal Grandfather     Heart Failure Paternal Grandfather     Osteoarthritis Mother     Liver Disease Maternal Grandmother     Heart Attack Maternal Grandfather      Outpatient Medications Marked as Taking for the 1/4/21 encounter (Office Visit) with Philip Perez MD   Medication Sig Dispense Refill    SANTYL 250 UNIT/GM ointment APPLY TO AFFECTED AREA EVERY DAY      docusate sodium (COLACE) 100 MG capsule Take 1 capsule by mouth 2 times daily 180 capsule 1    EPINEPHrine (EPIPEN 2-ERIKA) 0.3 MG/0.3ML SOAJ injection Inject as needed 2 each 1    Handicap Placard MISC by Does not apply route Myelomeningocele ([facility]  (primary encounter diagnosis)  Congenital hydrocephalus (HCC)  Multiple allergies    Duration:  5 years 1 each 0    loratadine (CLARITIN) 10 MG tablet Take 1 tablet by mouth daily 30 tablet 11    Gauze Pads & Dressings (GAUZE DRESSING) 4\"X4\" PADS 1 each by Does not apply route daily 30 each 5    docusate sodium (COLACE) 100 MG capsule Take 1 capsule by mouth 2 times daily (Patient taking differently: Take 100 mg by mouth daily as needed ) 30 capsule 2    Gauze Pads & Dressings (NU GAUZE PACKING STRIPS) MISC 1/4 inch plain packing gauze wet to dry to right buttock wound once daily cover with dry dressing 1 each 1    Multiple Vitamins-Minerals (THERAPEUTIC MULTIVITAMIN-MINERALS) tablet Take 1 tablet by mouth daily  Riboflavin (VITAMIN B-2 PO) Take 1 tablet by mouth         Latex, Ceclor [cefaclor], Seasonal, Levaquin [levofloxacin], Other, and Peanuts [peanut oil]  Social History     Tobacco Use   Smoking Status Never Smoker   Smokeless Tobacco Never Used     (Ifpatient a smoker, smoking cessation counseling offered)    Social History     Substance and Sexual Activity   Alcohol Use No       REVIEW OF SYSTEMS:  Review of Systems    Physical Exam:      Vitals:    01/04/21 1354   BP: 119/73   Pulse: 100   Temp: 98 °F (36.7 °C)     Body mass index is 30.23 kg/m². Patient is a 27 y.o. male in no acute distress and alert and oriented to person, place and time. Physical Exam  Constitutional: Patient in no acute distress. Neuro: Alert and oriented to person, place and time. Psych: Mood normal, affect normal  Skin: No rash noted  HEENT: Head: Normocephalic andatraumatic  Conjunctivae and EOM are normal. Pupils are equal, round  Nose:Normal  Right External Ear: Normal; Left External Ear: Normal  Mouth: Mucosa Moist  Neck: Supple      Assessment and Plan      1. Neurogenic bladder    2. Urinary retention           Plan:   Stool softener  Cont cic  F/u one year cysto and renal u/s         Return in about 1 year (around 1/4/2022) for Cystoscopy renal u/s. Prescriptions Ordered:  Orders Placed This Encounter   Medications    docusate sodium (COLACE) 100 MG capsule     Sig: Take 1 capsule by mouth 2 times daily     Dispense:  180 capsule     Refill:  1     Orders Placed:  No orders of the defined types were placed in this encounter. Pedrito Hanson MD    Agree with the ROS entered by the MA.

## 2023-01-23 ENCOUNTER — OFFICE VISIT (OUTPATIENT)
Dept: UROLOGY | Age: 33
End: 2023-01-23
Payer: MEDICARE

## 2023-01-23 VITALS — TEMPERATURE: 98 F | HEIGHT: 61 IN | WEIGHT: 187 LBS | BODY MASS INDEX: 35.3 KG/M2

## 2023-01-23 DIAGNOSIS — R33.9 URINARY RETENTION: ICD-10-CM

## 2023-01-23 DIAGNOSIS — N31.9 NEUROGENIC BLADDER: Primary | ICD-10-CM

## 2023-01-23 PROCEDURE — 99214 OFFICE O/P EST MOD 30 MIN: CPT | Performed by: UROLOGY

## 2023-01-23 PROCEDURE — G8484 FLU IMMUNIZE NO ADMIN: HCPCS | Performed by: UROLOGY

## 2023-01-23 PROCEDURE — G8427 DOCREV CUR MEDS BY ELIG CLIN: HCPCS | Performed by: UROLOGY

## 2023-01-23 PROCEDURE — 1036F TOBACCO NON-USER: CPT | Performed by: UROLOGY

## 2023-01-23 PROCEDURE — G8417 CALC BMI ABV UP PARAM F/U: HCPCS | Performed by: UROLOGY

## 2023-01-23 ASSESSMENT — ENCOUNTER SYMPTOMS
WHEEZING: 0
ABDOMINAL PAIN: 0
EYE PAIN: 0
VOMITING: 0
DIARRHEA: 0
SHORTNESS OF BREATH: 0
EYE REDNESS: 0
NAUSEA: 0
COUGH: 0
CONSTIPATION: 0
BACK PAIN: 0

## 2023-01-23 NOTE — PROGRESS NOTES
[address] [address]  Dept: 92 Josh H[facility] Urology Office Note - Established    Patient:  Reina Iqbal  YOB: 1990  Date: 1/23/2023    The patient is a 28 y.o. male who presents todayfor evaluation of the following problems:   Chief Complaint   Patient presents with    Bladder Problem     Neurogenic Bladder-Yearly check up        HPI  This is a 20-year-old gentleman with a history of neurogenic bladder. We have not seen him in a few years due to the pandemic. He does continue to self catheterize. Summary of old records: N/A    Additional History: N/A    Procedures Today: N/A    Urinalysis today:  No results found for this visit on 01/23/23. Last several PSA's:  No results found for: PSA  Last total testosterone:  No results found for: TESTOSTERONE    AUA Symptom Score (1/23/2023): Last BUN and creatinine:  Lab Results   Component Value Date    BUN 10 07/01/2018     Lab Results   Component Value Date    CREATININE 0.82 07/03/2018       Additional Lab/Culture results: none    Imaging Reviewed during this Office Visit: none  (results were independently reviewed by physician and radiology report verified)    PAST MEDICAL, FAMILY AND SOCIAL HISTORY UPDATE:  Past Medical History:   Diagnosis Date    Chronic headaches     Complex partial seizures with consciousness impaired ([address]     Congenital hydrocephalus ([address]     Dysplasia of hip     Esotropia, unspecified     corrective surgery 2000    Myelomeningocele ([address]     L4 functional level    Neurogenic bladder     Prepatellar bursitis of left knee 7/1/2018    S/P  shunt     Scoliosis     Seizures ([address]     Tethered cord ([address]     Release 1993 and 1996     Past Surgical History:   Procedure Laterality Date    CYST INCISION AND DRAINAGE  01/10/2017    unroofing of chronic sinus tract right buttocks. CYSTOSCOPY  5/29/15    EYE SURGERY      OTHER SURGICAL HISTORY Right 01/10/2017    unroofing buttock wound right side     SKIN BIOPSY Right 10/2/2017    EXCISION DEBRIDEMENT BUTTOCK WOUND WITH APPLICATION OF WOUND VAC performed by Betsy Douglas MD at [facility] OR    SPINAL FUSION      T7-L4    VENTRICULOPERITONEAL SHUNT      last revised 1999     Family History   Problem Relation Age of Onset    Heart Disease Father     Heart Surgery Father     Hypertension Father     Heart Disease Paternal Grandfather     Heart Failure Paternal Grandfather     Osteoarthritis Mother     Liver Disease Maternal Grandmother     Heart Attack Maternal Grandfather      Outpatient Medications Marked as Taking for the 1/23/23 encounter (Office Visit) with Kit Boston MD   Medication Sig Dispense Refill    SANTYL 250 UNIT/GM ointment APPLY TO AFFECTED AREA EVERY DAY      EPINEPHrine (EPIPEN 2-ERIKA) 0.3 MG/0.3ML SOAJ injection Inject as needed 2 each 1    Handicap Placard MISC by Does not apply route Myelomeningocele (HCC)  (primary encounter diagnosis)  Congenital hydrocephalus (HCC)  Multiple allergies    Duration:  5 years 1 each 0    loratadine (CLARITIN) 10 MG tablet Take 1 tablet by mouth daily 30 tablet 11    Gauze Pads & Dressings (GAUZE DRESSING) 4\"X4\" PADS 1 each by Does not apply route daily 30 each 5    docusate sodium (COLACE) 100 MG capsule Take 1 capsule by mouth 2 times daily (Patient taking differently: Take 100 mg by mouth daily as needed) 30 capsule 2    Gauze Pads & Dressings (NU GAUZE PACKING STRIPS) MISC 1/4 inch plain packing gauze wet to dry to right buttock wound once daily cover with dry dressing 1 each 1    Multiple Vitamins-Minerals (THERAPEUTIC MULTIVITAMIN-MINERALS) tablet Take 1 tablet by mouth daily      Riboflavin (VITAMIN B-2 PO) Take 1 tablet by mouth         Latex, Ceclor [cefaclor], Seasonal, Levaquin [levofloxacin], Other, and Peanuts [peanut oil]  Social History     Tobacco Use   Smoking Status Never  Smokeless Tobacco Never     (Ifpatient a smoker, smoking cessation counseling offered)    Social History     Substance and Sexual Activity   Alcohol Use No       REVIEW OF SYSTEMS:  Review of Systems    Physical Exam:      Vitals:    01/23/23 1006   Temp: 98 °F (36.7 °C)     Body mass index is 35.33 kg/m². Patient is a 28 y.o. male in no acute distress and alert and oriented to person, place and time. Physical Exam  Constitutional: Patient in no acute distress. Neuro: Alert and oriented to person, place and time. Psych: Mood normal, affect normal  Skin: No rash noted  HEENT: Head: Normocephalic andatraumatic      Assessment and Plan      1. Neurogenic bladder    2. Urinary retention           Plan:   Renal u/s and cysto      Return for Next available appointment, Cystoscopy and renal u/s. Prescriptions Ordered:  No orders of the defined types were placed in this encounter. Orders Placed:  Orders Placed This Encounter   Procedures    US RENAL LIMITED     This procedure can be scheduled via [facility]. Access your [facility] account by visiting [email or web address]. Standing Status:   Future     Standing Expiration Date:   1/18/2024           Dayton Aly MD    Agree with the ROS entered by the MA.

## 2023-02-10 ENCOUNTER — TELEPHONE (OUTPATIENT)
Dept: UROLOGY | Age: 33
End: 2023-02-10

## 2023-02-10 NOTE — TELEPHONE ENCOUNTER
Voicemail was left for patient in regards to [address] not being completed.

## 2023-02-17 ENCOUNTER — TELEPHONE (OUTPATIENT)
Dept: UROLOGY | Age: 33
End: 2023-02-17

## 2023-02-17 ENCOUNTER — HOSPITAL ENCOUNTER (OUTPATIENT)
Dept: ULTRASOUND IMAGING | Age: 33
Discharge: HOME | End: 2023-02-17
Payer: MEDICAID

## 2023-02-17 DIAGNOSIS — R33.9 URINARY RETENTION: ICD-10-CM

## 2023-02-17 DIAGNOSIS — N31.9 NEUROGENIC BLADDER: ICD-10-CM

## 2023-02-17 PROCEDURE — 76775 US EXAM ABDO BACK WALL LIM: CPT

## 2023-02-17 NOTE — TELEPHONE ENCOUNTER
Spoke with Geeta, advised Dr. Danetta Burkitt will be in on Monday and results will be discussed with  at that time. Geeta voiced understanding. Geeta also noted that the [place] stated that Judd's right kidney was \"higher up. \"

## 2023-02-17 NOTE — TELEPHONE ENCOUNTER
Voicemail was left main line in regards to [place] having his US done. Geeta stated \"They were unable to see his left kidney. I was wondering if Cristian Mayer would like CT done prior to appointment in March. \"    This will be discussed with Cristian Mayer on Monday

## 2023-03-06 ENCOUNTER — PROCEDURE VISIT (OUTPATIENT)
Dept: UROLOGY | Age: 33
End: 2023-03-06
Payer: MEDICAID

## 2023-03-06 VITALS — HEIGHT: 61 IN | WEIGHT: 187 LBS | BODY MASS INDEX: 35.3 KG/M2

## 2023-03-06 DIAGNOSIS — N31.9 NEUROGENIC BLADDER: ICD-10-CM

## 2023-03-06 DIAGNOSIS — R33.9 URINARY RETENTION: Primary | ICD-10-CM

## 2023-03-06 PROCEDURE — 52000 CYSTOURETHROSCOPY: CPT | Performed by: UROLOGY

## 2023-03-06 NOTE — PROGRESS NOTES
Cystoscopy Operative Note (3/6/23)  Surgeon: Eulogio Doty MD  Anesthesia: Urethral 2% Xylocaine   Indications: Urinary Retention  Position: Dorsal Lithotomy    Findings:   Risks and Benefits discussed with patient prior to procedure. The patient was prepped and draped in the usual sterile fashion. The flexible cystoscope was advanced through the urethra and into the bladder. The bladder was thoroughly inspected and the following was noted:    Residual Urine: none  Urethra: normal appearing urethra with no masses, tenderness or lesions  Prostate: non obstructing lateral lobes of prostate; median lobe present? no.   Bladder: No tumors or CIS noted. No bladder diverticulum. There was none trabeculation noted. Ureters: Clear efflux from both ureters. Orifices with normal configuration and location. The cystoscope was removed. The patient tolerated the procedure well. Agree with the ROS entered by the MA. Plan: CT of the abdomen pelvis. Left kidney was not visualized on recent ultrasound. Patient has no surgical history to explain an absent left kidney.

## 2023-03-27 ENCOUNTER — HOSPITAL ENCOUNTER (OUTPATIENT)
Dept: CT IMAGING | Age: 33
Discharge: HOME | End: 2023-03-29
Payer: MEDICAID

## 2023-03-27 DIAGNOSIS — R33.9 URINARY RETENTION: ICD-10-CM

## 2023-03-27 DIAGNOSIS — N31.9 NEUROGENIC BLADDER: ICD-10-CM

## 2023-03-27 PROCEDURE — 74176 CT ABD & PELVIS W/O CONTRAST: CPT

## 2023-04-03 ENCOUNTER — OFFICE VISIT (OUTPATIENT)
Dept: UROLOGY | Age: 33
End: 2023-04-03
Payer: MEDICAID

## 2023-04-03 VITALS
DIASTOLIC BLOOD PRESSURE: 79 MMHG | WEIGHT: 187 LBS | SYSTOLIC BLOOD PRESSURE: 108 MMHG | BODY MASS INDEX: 35.3 KG/M2 | RESPIRATION RATE: 16 BRPM | TEMPERATURE: 97.6 F | HEIGHT: 61 IN | HEART RATE: 89 BPM

## 2023-04-03 DIAGNOSIS — R33.9 URINARY RETENTION: ICD-10-CM

## 2023-04-03 DIAGNOSIS — N31.9 NEUROGENIC BLADDER: Primary | ICD-10-CM

## 2023-04-03 PROCEDURE — 99213 OFFICE O/P EST LOW 20 MIN: CPT | Performed by: UROLOGY

## 2023-04-03 ASSESSMENT — ENCOUNTER SYMPTOMS
CONSTIPATION: 0
SHORTNESS OF BREATH: 0
DIARRHEA: 0
ABDOMINAL PAIN: 0
VOMITING: 0
BACK PAIN: 0
COUGH: 0
WHEEZING: 0
EYE PAIN: 0
NAUSEA: 0

## 2023-04-03 NOTE — PROGRESS NOTES
Patient has chronic retention d/t NGB. He will be cathing six times a day.  Patient needs a coude catheter, as he has been unable to pass a straight cathter in the past.
Review of Systems   Constitutional:  Negative for appetite change, chills, fatigue and fever. Eyes:  Negative for pain and visual disturbance. Respiratory:  Negative for cough, shortness of breath and wheezing. Cardiovascular:  Negative for chest pain and leg swelling. Gastrointestinal:  Negative for abdominal pain, constipation, diarrhea, nausea and vomiting. Genitourinary:  Negative for difficulty urinating, dysuria, frequency, hematuria, penile pain and testicular pain. Musculoskeletal:  Negative for back pain and myalgias. Neurological:  Negative for dizziness, tremors, weakness, light-headedness, numbness and headaches. Hematological:  Negative for adenopathy. Does not bruise/bleed easily.  none
years 1 each 0    loratadine (CLARITIN) 10 MG tablet Take 1 tablet by mouth daily 30 tablet 11    Gauze Pads & Dressings (GAUZE DRESSING) 4\"X4\" PADS 1 each by Does not apply route daily 30 each 5    docusate sodium (COLACE) 100 MG capsule Take 1 capsule by mouth 2 times daily (Patient taking differently: Take 1 capsule by mouth daily as needed) 30 capsule 2    Gauze Pads & Dressings (NU GAUZE PACKING STRIPS) MISC 1/4 inch plain packing gauze wet to dry to right buttock wound once daily cover with dry dressing 1 each 1    Multiple Vitamins-Minerals (THERAPEUTIC MULTIVITAMIN-MINERALS) tablet Take 1 tablet by mouth daily      Riboflavin (VITAMIN B-2 PO) Take 1 tablet by mouth         Latex, Ceclor [cefaclor], Seasonal, Levaquin [levofloxacin], Other, and Peanuts [peanut oil]  Social History     Tobacco Use   Smoking Status Never   Smokeless Tobacco Never     (Ifpatient a smoker, smoking cessation counseling offered)    Social History     Substance and Sexual Activity   Alcohol Use No       REVIEW OF SYSTEMS:  Review of Systems    Physical Exam:      Vitals:    04/03/23 1044   BP: 108/79   Pulse: 89   Resp: 16   Temp: 97.6 °F (36.4 °C)     Body mass index is 35.33 kg/m². Patient is a 28 y.o. male in no acute distress and alert and oriented to person, place and time. Physical Exam  Constitutional: Patient in no acute distress. Neuro: Alert and oriented to person, place and time. Psych: Mood normal, affect normal  Skin: No rash noted  HEENT: Head: Normocephalic andatraumatic  Conjunctivae and EOM are normal. Pupils are equal, round  Nose:Normal  Right External Ear: Normal; Left External Ear: Normal      Assessment and Plan      1. Neurogenic bladder           Plan:   F/u 2 years cysto and renal u/s      Return in about 2 years (around 4/3/2025) for Cystoscopy and renal u/s. Prescriptions Ordered:  No orders of the defined types were placed in this encounter.     Orders Placed:  Orders Placed This Encounter

## 2025-02-05 ENCOUNTER — TELEPHONE (OUTPATIENT)
Dept: UROLOGY | Age: 35
End: 2025-02-05

## 2025-02-05 NOTE — TELEPHONE ENCOUNTER
Cath order received.  Pt has not been seen since 4/2023.  Writer called and spoke with mom, who scheduled appt for next week.

## 2025-02-10 ENCOUNTER — OFFICE VISIT (OUTPATIENT)
Dept: UROLOGY | Age: 35
End: 2025-02-10
Payer: MEDICARE

## 2025-02-10 VITALS
TEMPERATURE: 97.8 F | OXYGEN SATURATION: 96 % | HEART RATE: 101 BPM | RESPIRATION RATE: 18 BRPM | DIASTOLIC BLOOD PRESSURE: 80 MMHG | SYSTOLIC BLOOD PRESSURE: 132 MMHG

## 2025-02-10 DIAGNOSIS — N31.9 NEUROGENIC BLADDER: Primary | ICD-10-CM

## 2025-02-10 DIAGNOSIS — R33.9 URINARY RETENTION: ICD-10-CM

## 2025-02-10 PROCEDURE — G8421 BMI NOT CALCULATED: HCPCS | Performed by: UROLOGY

## 2025-02-10 PROCEDURE — 1036F TOBACCO NON-USER: CPT | Performed by: UROLOGY

## 2025-02-10 PROCEDURE — G8427 DOCREV CUR MEDS BY ELIG CLIN: HCPCS | Performed by: UROLOGY

## 2025-02-10 PROCEDURE — 99213 OFFICE O/P EST LOW 20 MIN: CPT | Performed by: UROLOGY

## 2025-02-10 RX ORDER — FLUTICASONE PROPIONATE 50 MCG
SPRAY, SUSPENSION (ML) NASAL
COMMUNITY
Start: 2024-11-26

## 2025-02-10 ASSESSMENT — ENCOUNTER SYMPTOMS
COLOR CHANGE: 0
GASTROINTESTINAL NEGATIVE: 1
VOMITING: 0
ALLERGIC/IMMUNOLOGIC NEGATIVE: 1
BACK PAIN: 0
EYES NEGATIVE: 1
SHORTNESS OF BREATH: 0
RESPIRATORY NEGATIVE: 1
WHEEZING: 0
NAUSEA: 0
EYE PAIN: 0
ABDOMINAL PAIN: 0
EYE REDNESS: 0
COUGH: 0

## 2025-02-10 NOTE — PROGRESS NOTES
[facility] PHYSICIANS [facility] UROLOGY CENTER  [address]  [facility] [address]  Dept: [phone]    [facility] Urology Office Note - Established    Patient:  Nourm G Freyer  YOB: 1990  Date: 2/10/2025    The patient is a 34 y.o. male who presents todayfor evaluation of the following problems:   Chief Complaint   Patient presents with    Other     follow up, needs new cath order (in folder to sign)       HPI  This is a 34-year-old gentleman who has a neurogenic bladder and spina bifida.  He does chronic self catheterizations 3-4 times per day and is doing well with this.  He voices no new complaints.    Summary of old records: N/A    Additional History: N/A    Procedures Today: N/A    Urinalysis today:  No results found for this visit on 02/10/25.  Last several PSA's:  No results found for: \"PSA\"  Last total testosterone:  No results found for: \"TESTOSTERONE\"    AUA Symptom Score (2/10/2025):                               Last BUN and creatinine:  Lab Results   Component Value Date    BUN 10 07/01/2018     Lab Results   Component Value Date    CREATININE 0.82 07/03/2018       Additional Lab/Culture results: none    Imaging Reviewed during this Office Visit: none  (results were independently reviewed by physician and radiology report verified)    PAST MEDICAL, FAMILY AND SOCIAL HISTORY UPDATE:  Past Medical History:   Diagnosis Date    Chronic headaches     Complex partial seizures with consciousness impaired (HCC)     Congenital hydrocephalus (HCC)     Dysplasia of hip     Esotropia, unspecified     corrective surgery 2000    Myelomeningocele (HCC)     L4 functional level    Neurogenic bladder     Prepatellar bursitis of left knee 7/1/2018    S/P  shunt     Scoliosis     Seizures (HCC)     Tethered cord (HCC)     Release 1993 and 1996     Past Surgical History:   Procedure Laterality Date    CYST INCISION AND DRAINAGE  01/10/2017    unroofing of

## (undated) DEVICE — GLOVE ORANGE PI 7   MSG9070

## (undated) DEVICE — ST CHARLES MINOR ABDOMINAL PK: Brand: MEDLINE INDUSTRIES, INC.

## (undated) DEVICE — SWAB CULT CLR BLU PLAS RAYON LIQ AMIES AERB ANAERB FRIC CAP

## (undated) DEVICE — MEDI-VAC YANKAUER SUCTION HANDLE W/BULBOUS TIP: Brand: CARDINAL HEALTH

## (undated) DEVICE — Z CONVERTED USE 2271043 CONTAINER SPEC COLL 4OZ SCR ON LID PEEL PCH

## (undated) DEVICE — SOLUTION IV IRRIG POUR BRL 0.9% SODIUM CHL 2F7124

## (undated) DEVICE — PREP SOL PVP IODINE 4%  4 OZ/BTL

## (undated) DEVICE — TUBE ET DIA7.5MM ORAL NSL CUF MURPHY EYE HI LO RADPQ LN

## (undated) DEVICE — GOWN SURGICALXL REUSE REPROC

## (undated) DEVICE — SOLUTION SURG PREP POV IOD 7.5% 4 OZ

## (undated) DEVICE — Device

## (undated) DEVICE — 1200CC GUARDIAN II: Brand: GUARDIAN

## (undated) DEVICE — HYPODERMIC SAFETY NEEDLE: Brand: MAGELLAN

## (undated) DEVICE — Z DISCONTINUED BY MEDLINE USE 2711682 TRAY SKIN PREP DRY W/ PREM GLV

## (undated) DEVICE — NO USE 18 MONTHS GOWN STD LG  1153D